# Patient Record
Sex: FEMALE | Race: WHITE | Employment: FULL TIME | URBAN - METROPOLITAN AREA
[De-identification: names, ages, dates, MRNs, and addresses within clinical notes are randomized per-mention and may not be internally consistent; named-entity substitution may affect disease eponyms.]

---

## 2017-04-21 ENCOUNTER — OFFICE VISIT (OUTPATIENT)
Dept: FAMILY MEDICINE CLINIC | Age: 57
End: 2017-04-21

## 2017-04-21 VITALS
HEIGHT: 60 IN | DIASTOLIC BLOOD PRESSURE: 74 MMHG | WEIGHT: 166 LBS | SYSTOLIC BLOOD PRESSURE: 121 MMHG | RESPIRATION RATE: 16 BRPM | HEART RATE: 69 BPM | TEMPERATURE: 98.7 F | BODY MASS INDEX: 32.59 KG/M2 | OXYGEN SATURATION: 96 %

## 2017-04-21 DIAGNOSIS — E55.9 VITAMIN D DEFICIENCY: ICD-10-CM

## 2017-04-21 DIAGNOSIS — E78.5 HYPERLIPIDEMIA, UNSPECIFIED HYPERLIPIDEMIA TYPE: ICD-10-CM

## 2017-04-21 DIAGNOSIS — F32.1 MODERATE SINGLE CURRENT EPISODE OF MAJOR DEPRESSIVE DISORDER (HCC): ICD-10-CM

## 2017-04-21 DIAGNOSIS — Z12.11 COLON CANCER SCREENING: ICD-10-CM

## 2017-04-21 DIAGNOSIS — Z00.00 GENERAL MEDICAL EXAM: Primary | ICD-10-CM

## 2017-04-21 DIAGNOSIS — N95.9 MENOPAUSAL DISORDER: ICD-10-CM

## 2017-04-21 DIAGNOSIS — E03.4 HYPOTHYROIDISM DUE TO ACQUIRED ATROPHY OF THYROID: ICD-10-CM

## 2017-04-21 DIAGNOSIS — F41.9 ANXIETY: ICD-10-CM

## 2017-04-21 RX ORDER — LEVOTHYROXINE AND LIOTHYRONINE 57; 13.5 UG/1; UG/1
90 TABLET ORAL DAILY
Qty: 90 TAB | Refills: 4 | Status: SHIPPED | OUTPATIENT
Start: 2017-04-21 | End: 2017-11-24 | Stop reason: SDUPTHER

## 2017-04-21 RX ORDER — VENLAFAXINE 37.5 MG/1
37.5 TABLET ORAL 3 TIMES DAILY
Qty: 90 TAB | Refills: 0 | Status: SHIPPED | OUTPATIENT
Start: 2017-04-21 | End: 2017-11-10 | Stop reason: ALTCHOICE

## 2017-04-21 RX ORDER — ALPRAZOLAM 0.5 MG/1
0.5 TABLET ORAL
Qty: 90 TAB | Refills: 2 | Status: SHIPPED | OUTPATIENT
Start: 2017-04-21 | End: 2017-11-10 | Stop reason: SDUPTHER

## 2017-04-21 NOTE — PATIENT INSTRUCTIONS
Recovering From Depression: Care Instructions  Your Care Instructions  Taking good care of yourself is important as you recover from depression. In time, your symptoms will fade as your treatment takes hold. Do not give up. Instead, focus your energy on getting better. Your mood will improve. It just takes some time. Focus on things that can help you feel better, such as being with friends and family, eating well, and getting enough rest. But take things slowly. Do not do too much too soon. You will begin to feel better gradually. Follow-up care is a key part of your treatment and safety. Be sure to make and go to all appointments, and call your doctor if you are having problems. It's also a good idea to know your test results and keep a list of the medicines you take. How can you care for yourself at home? Be realistic  · If you have a large task to do, break it up into smaller steps you can handle, and just do what you can. · You may want to put off important decisions until your depression has lifted. If you have plans that will have a major impact on your life, such as marriage, divorce, or a job change, try to wait a bit. Talk it over with friends and loved ones who can help you look at the overall picture first.  · Reaching out to people for help is important. Do not isolate yourself. Let your family and friends help you. Find someone you can trust and confide in, and talk to that person. · Be patient, and be kind to yourself. Remember that depression is not your fault and is not something you can overcome with willpower alone. Treatment is necessary for depression, just like for any other illness. Feeling better takes time, and your mood will improve little by little. Stay active  · Stay busy and get outside. Take a walk, or try some other light exercise. · Talk with your doctor about an exercise program. Exercise can help with mild depression. · Go to a movie or concert.  Take part in a Anab[employer] activity or other social gathering. Go to a [employer] game. · Ask a friend to have dinner with you. Take care of yourself  · Eat a balanced diet with plenty of fresh fruits and vegetables, whole grains, and lean protein. If you have lost your appetite, eat small snacks rather than large meals. · Avoid drinking alcohol or using illegal drugs. Do not take medicines that have not been prescribed for you. They may interfere with medicines you may be taking for depression, or they may make your depression worse. · Take your medicines exactly as they are prescribed. You may start to feel better within 1 to 3 weeks of taking antidepressant medicine. But it can take as many as 6 to 8 weeks to see more improvement. If you have questions or concerns about your medicines, or if you do not notice any improvement by 3 weeks, talk to your doctor. · If you have any side effects from your medicine, tell your doctor. Antidepressants can make you feel tired, dizzy, or nervous. Some people have dry mouth, constipation, headaches, sexual problems, or diarrhea. Many of these side effects are mild and will go away on their own after you have been taking the medicine for a few weeks. Some may last longer. Talk to your doctor if side effects are bothering you too much. You might be able to try a different medicine. · Get enough sleep. If you have problems sleeping:  ¨ Go to bed at the same time every night, and get up at the same time every morning. ¨ Keep your bedroom dark and quiet. ¨ Do not exercise after 5:00 p.m. ¨ Avoid drinks with caffeine after 5:00 p.m. · Avoid sleeping pills unless they are prescribed by the doctor treating your depression. Sleeping pills may make you groggy during the day, and they may interact with other medicine you are taking. · If you have any other illnesses, such as diabetes, heart disease, or high blood pressure, make sure to continue with your treatment.  Tell your doctor about all of the medicines you take, including those with or without a prescription. · Keep the numbers for these national suicide hotlines: 9-581-724-TALK (9-753.761.9762) and 3-200-GKJYXBU (4-670.823.1898). If you or someone you know talks about suicide or feeling hopeless, get help right away. When should you call for help? Call 911 anytime you think you may need emergency care. For example, call if:  · You feel like hurting yourself or someone else. · Someone you know has depression and is about to attempt or is attempting suicide. Call your doctor now or seek immediate medical care if:  · You hear voices. · Someone you know has depression and:  ¨ Starts to give away his or her possessions. ¨ Uses illegal drugs or drinks alcohol heavily. ¨ Talks or writes about death, including writing suicide notes or talking about guns, knives, or pills. ¨ Starts to spend a lot of time alone. ¨ Acts very aggressively or suddenly appears calm. Watch closely for changes in your health, and be sure to contact your doctor if:  · You do not get better as expected. Where can you learn more? Go to http://go-judy.info/. Enter L457 in the search box to learn more about \"Recovering From Depression: Care Instructions. \"  Current as of: July 26, 2016  Content Version: 11.2  © 5143-9466 "[employer] Incorporated. Care instructions adapted under license by [employer] (which disclaims liability or warranty for this information). If you have questions about a medical condition or this instruction, always ask your healthcare professional. [place] [address] any warranty or liability for your use of this information.

## 2017-04-21 NOTE — MR AVS SNAPSHOT
Visit Information Date & Time Provider Department Dept. Phone Encounter #  
 4/21/2017  8:00 AM Kaylyn Mueller MD [address] [facility] [phone] [id number] Upcoming Health Maintenance Date Due FOBT Q 1 YEAR AGE 50-75 11/7/2015 PAP AKA CERVICAL CYTOLOGY 5/30/2017 BREAST CANCER SCRN MAMMOGRAM 6/28/2018 DTaP/Tdap/Td series (2 - Td) 11/7/2020 Allergies as of 4/21/2017  Review Complete On: 4/21/2017 By: Kaylyn Mueller MD  
  
 Severity Noted Reaction Type Reactions Adhesive High 11/18/2010   Topical Other (comments) Eat skin off Codeine High 11/18/2010   Systemic Anaphylaxis Darvon [Propoxyphene] High 11/18/2010   Side Effect Itching Percocet [Oxycodone-acetaminophen] High 11/18/2010   Side Effect Itching Sulfa (Sulfonamide Antibiotics) High 11/18/2010   Systemic Hives Vicodin [Hydrocodone-acetaminophen] High 11/18/2010   Side Effect Itching Avocado  04/21/2017    Swelling Demerol [Meperidine]  11/22/2010   Side Effect Itching Dilaudid [Hydromorphone]  11/22/2010   Side Effect Itching Lexapro [Escitalopram]  04/21/2017    Other (comments)  
 shake Current Immunizations  Reviewed on 12/2/2015 Name Date Influenza Vaccine 10/21/2016, 10/18/2013 Influenza Vaccine Louellen Dexter) 11/7/2014 Influenza Vaccine (Quad) PF 12/2/2015 Influenza Vaccine Split 9/19/2012 Influenza Vaccine Whole 11/4/2010 Tdap 11/7/2010 Not reviewed this visit You Were Diagnosed With   
  
 Codes Comments General medical exam    -  Primary ICD-10-CM: Z00.00 ICD-9-CM: V70.9 Hypothyroidism due to acquired atrophy of thyroid     ICD-10-CM: E03.4 ICD-9-CM: 244.8, 246.8 Hyperlipidemia, unspecified hyperlipidemia type     ICD-10-CM: E78.5 ICD-9-CM: 272.4 Vitamin D deficiency     ICD-10-CM: E55.9 ICD-9-CM: 268.9 Colon cancer screening     ICD-10-CM: Z12.11 ICD-9-CM: V76.51 Anxiety     ICD-10-CM: F41.9 ICD-9-CM: 300.00 Menopausal disorder     ICD-10-CM: N95.9 ICD-9-CM: 627.9 Moderate single current episode of major depressive disorder (HCC)     ICD-10-CM: F32.1 ICD-9-CM: 296.22 Vitals BP Pulse Temp Resp Height(growth percentile) Weight(growth percentile) 121/74 (BP 1 Location: Left arm, BP Patient Position: Sitting) 69 98.7 °F (37.1 °C) 16 5' (1.524 m) 166 lb (75.3 kg) LMP SpO2 BMI OB Status Smoking Status 12/23/2011 96% 32.42 kg/m2 Postmenopausal Former Smoker BMI and BSA Data Body Mass Index Body Surface Area  
 32.42 kg/m 2 1.79 m 2 Preferred Pharmacy Pharmacy Name Phone [facility] [phone] Your Updated Medication List  
  
   
This list is accurate as of: 4/21/17  9:00 AM.  Always use your most recent med list.  
  
  
  
  
 ALPRAZolam 0.5 mg tablet Commonly known as:  Nicole Sham Take 1 Tab by mouth three (3) times daily as needed. Max Daily Amount: 1.5 mg.  
  
 COMBIPATCH 0.05-0.25 mg/24 hr  
Generic drug:  estradiol-norethindrone 1 Patch by TransDERmal route every Monday. thyroid (Pork) 90 mg tablet Commonly known as:  ARMOUR THYROID Take 1 Tab by mouth daily. venlafaxine 37.5 mg tablet Commonly known as:  [facility] Take 1 Tab by mouth three (3) times daily. Start once a day and slowly increase to three times a day Prescriptions Printed Refills ALPRAZolam (XANAX) 0.5 mg tablet 2 Sig: Take 1 Tab by mouth three (3) times daily as needed. Max Daily Amount: 1.5 mg.  
 Class: Print Route: Oral  
  
Prescriptions Sent to Pharmacy Refills  
 thyroid, Pork, (ARMOUR THYROID) 90 mg tablet 4 Sig: Take 1 Tab by mouth daily. Class: Normal  
 [facility]acy: [facility] [address]  #: [phone] Route: Oral  
 venlafaxine (EFFEXOR) 37.5 mg tablet 0 Sig: Take 1 Tab by mouth three (3) times daily.  Start once a day and slowly increase to three times a day Class: Normal  
 [facility]  #: [phone] Route: Oral  
  
We Performed the Following CBC WITH AUTOMATED DIFF [40243 CPT(R)] LIPID PANEL [54429 CPT(R)] METABOLIC PANEL, COMPREHENSIVE [81093 CPT(R)] OCCULT BLOOD, IMMUNOASSAY (FIT) [id number] CPT(R)] TSH 3RD GENERATION [11039 CPT(R)] VITAMIN D, 25 HYDROXY [id number] CPT(R)] Patient Instructions Recovering From Depression: Care Instructions Your Care Instructions Taking good care of yourself is important as you recover from depression. In time, your symptoms will fade as your treatment takes hold. Do not give up. Instead, focus your energy on getting better. Your mood will improve. It just takes some time. Focus on things that can help you feel better, such as being with friends and family, eating well, and getting enough rest. But take things slowly. Do not do too much too soon. You will begin to feel better gradually. Follow-up care is a key part of your treatment and safety. Be sure to make and go to all appointments, and call your doctor if you are having problems. It's also a good idea to know your test results and keep a list of the medicines you take. How can you care for yourself at home? Be realistic · If you have a large task to do, break it up into smaller steps you can handle, and just do what you can. · You may want to put off important decisions until your depression has lifted. If you have plans that will have a major impact on your life, such as marriage, divorce, or a job change, try to wait a bit. Talk it over with friends and loved ones who can help you look at the overall picture first. 
· Reaching out to people for help is important. Do not isolate yourself. Let your family and friends help you. Find someone you can trust and confide in, and talk to that person. · Be patient, and be kind to yourself. Remember that depression is not your fault and is not something you can overcome with willpower alone. Treatment is necessary for depression, just like for any other illness. Feeling better takes time, and your mood will improve little by little. Stay active · Stay busy and get outside. Take a walk, or try some other light exercise. · Talk with your doctor about an exercise program. Exercise can help with mild depression. · Go to a movie or concert. Take part in a [employer] activity or other social gathering. Go to a [employer] game. · Ask a friend to have dinner with you. Take care of yourself · Eat a balanced diet with plenty of fresh fruits and vegetables, whole grains, and lean protein. If you have lost your appetite, eat small snacks rather than large meals. · Avoid drinking alcohol or using illegal drugs. Do not take medicines that have not been prescribed for you. They may interfere with medicines you may be taking for depression, or they may make your depression worse. · Take your medicines exactly as they are prescribed. You may start to feel better within 1 to 3 weeks of taking antidepressant medicine. But it can take as many as 6 to 8 weeks to see more improvement. If you have questions or concerns about your medicines, or if you do not notice any improvement by 3 weeks, talk to your doctor. · If you have any side effects from your medicine, tell your doctor. Antidepressants can make you feel tired, dizzy, or nervous. Some people have dry mouth, constipation, headaches, sexual problems, or diarrhea. Many of these side effects are mild and will go away on their own after you have been taking the medicine for a few weeks. Some may last longer. Talk to your doctor if side effects are bothering you too much. You might be able to try a different medicine. · Get enough sleep. If you have problems sleeping: ¨ Go to bed at the same time every night, and get up at the same time every morning. ¨ Keep your bedroom dark and quiet. ¨ Do not exercise after 5:00 p.m. ¨ Avoid drinks with caffeine after 5:00 p.m. · Avoid sleeping pills unless they are prescribed by the doctor treating your depression. Sleeping pills may make you groggy during the day, and they may interact with other medicine you are taking. · If you have any other illnesses, such as diabetes, heart disease, or high blood pressure, make sure to continue with your treatment. Tell your doctor about all of the medicines you take, including those with or without a prescription. · Keep the numbers for these national suicide hotlines: 7-587-186-TALK (8-230.137.5784) and 3-395-VHKNRRI (8-968.443.9521). If you or someone you know talks about suicide or feeling hopeless, get help right away. When should you call for help? Call 911 anytime you think you may need emergency care. For example, call if: 
· You feel like hurting yourself or someone else. · Someone you know has depression and is about to attempt or is attempting suicide. Call your doctor now or seek immediate medical care if: 
· You hear voices. · Someone you know has depression and: 
¨ Starts to give away his or her possessions. ¨ Uses illegal drugs or drinks alcohol heavily. ¨ Talks or writes about death, including writing suicide notes or talking about guns, knives, or pills. ¨ Starts to spend a lot of time alone. ¨ Acts very aggressively or suddenly appears calm. Watch closely for changes in your health, and be sure to contact your doctor if: 
· You do not get better as expected. Where can you learn more? Go to http://go-judy.info/. Enter W842 in the search box to learn more about \"Recovering From Depression: Care Instructions. \" Current as of: July 26, 2016 Content Version: 11.2 © 3882-8090 No.1 [employer].  Care instructions adapted under license by [address] (which disclaims liability or warranty for this information). If you have questions about a medical condition or this instruction, always ask your healthcare professional. [address] any warranty or liability for your use of this information. Introducing [facility]! Dear Kerry Barboza: Thank you for requesting a [employer] account. Our records indicate that you already have an active [employer] account. You can access your account anytime at [email or web address] Did you know that you can access your hospital and ER discharge instructions at any time in [employer]? You can also review all of your test results from your hospital stay or ER visit. Additional Information If you have questions, please visit the Frequently Asked Questions section of the [employer] website at [email or web address] Remember, [employer] is NOT to be used for urgent needs. For medical emergencies, dial 911. Now available from your iPhone and Android! Please provide this summary of care documentation to your next provider. Your primary care clinician is listed as Beatriz Lopez. If you have any questions after today's visit, please call [phone].

## 2017-04-21 NOTE — PROGRESS NOTES
55 y.o. female presents for a physical.    Patient Active Problem List    Diagnosis    Hypothyroidism     S/p right hemithyroidectomy for nodules. Benign      Hyperlipidemia - Takes medication at night as directed, no muscle aches. Tries to watch diet.  Anxiety - taking xanax most evenings, but has been sometimes 4 days without taking it.  Menopausal disorder - complains of worsening anxiety, difficulty losing weight, memory problems, crying frequently. Past Medical History:   Diagnosis Date    Anxiety     Asthma 2008    allergy induced in [address] ([facility] [address]     basal cell 20 years ago upper abdomen    Foot fracture     Hyperlipidemia     Hypothyroidism     Interstitial cystitis     [facility] at [facility] Menopausal disorder     Other ill-defined conditions     kidney stones in [place]    PONV (postoperative nausea and vomiting)     severe nausea 1x in the past    Psychiatric disorder 11/10    anxiety attack    Thyroid disease        Past Surgical History:   Procedure Laterality Date    BREAST SURGERY PROCEDURE UNLISTED      left breast lumpectomy    HX ADENOIDECTOMY  1977    HX DILATION AND CURETTAGE      miscarriage x1    HX GI  2010    cholecystectomy    HX GYN      laparoscopy for fertility    HX OTHER SURGICAL  2006    hemithyroidectomy right, gall bladder removal    HX TONSILLECTOMY  1977       Family History   Problem Relation Age of Onset    Cancer Mother     Heart Disease Father     Heart Disease Brother        Social History   Substance Use Topics    Smoking status: Former Smoker     Packs/day: 1.00     Years: 12.00     Quit date: 2/12/1990    Smokeless tobacco: Never Used    Alcohol use No      Comment: rarely       Social History     Social History Narrative    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   Living with        Health Maintenance Due   Topic Date Due    FOBT Q 1 YEAR AGE 50-75  11/07/2015    INFLUENZA AGE 9 TO ADULT  08/01/2016    PAP AKA CERVICAL CYTOLOGY  05/30/2017       Outpatient Prescriptions Marked as Taking for the 4/21/17 encounter (Office Visit) with Gwen Kapadia MD   Medication Sig Dispense Refill    ALPRAZolam Euell Pau) 0.5 mg tablet Take 1 Tab by mouth as needed. 90 Tab 2    thyroid, Pork, (ARMOUR THYROID) 90 mg tablet Take 1 Tab by mouth daily. 90 Tab 4    estradiol-norethindrone (COMBIPATCH) 0.05-0.25 mg/24 hr 1 Patch by TransDERmal route every Monday.            Allergies   Allergen Reactions    Adhesive Other (comments)     Eat skin off    Codeine Anaphylaxis    Darvon [Propoxyphene] Itching    Percocet [Oxycodone-Acetaminophen] Itching    Sulfa (Sulfonamide Antibiotics) Hives    Vicodin [Hydrocodone-Acetaminophen] Itching    Avocado Swelling    Demerol [Meperidine] Itching    Dilaudid [Hydromorphone] Itching       Review of Systems:  Gen: no fatigue, fever, chills, weight loss or weight gain  Eyes: no excessive tearing, itching, or discharge  Nose: no rhinorrhea, no sinus pain  Mouth: no oral lesions, no sore throat  Resp: no shortness of breath, no wheezing, no cough  CV: no chest pain, no orthopnea, no paroxysmal nocturnal dyspnea, no lower extremity edema, no palpitations  Abd: no nausea, no heartburn, no diarrhea, no constipation, no abdominal pain  Neuro: no headaches, no syncope or presyncopal episodes  Endo: no polyuria, no polydipsia  Heme: no lymphadenopathy, no easy bruising or bleeding    Visit Vitals    /74 (BP 1 Location: Left arm, BP Patient Position: Sitting)    Pulse 69    Temp 98.7 °F (37.1 °C)    Resp 16    Ht 5' (1.524 m)    Wt 166 lb (75.3 kg)    LMP 12/23/2011    SpO2 96%    BMI 32.42 kg/m2     Gen: alert, oriented, no acute distress  Ears: external auditory canals clear, TMs without erythema or effusion  Eyes: pupils equal round reactive to light, sclera clear, conjunctiva clear  Oral: moist mucus membranes, no oral lesions, no pharyngeal inflammation or exudate  Neck: thyroid symmetric and not enlarged, no carotid bruits, no jugular vein distention  Resp: no increase work of breathing, lungs clear to ausculation bilaterally, no wheezing, rales or rhonchi  CV: S1, S2 normal. No murmurs, rubs, or gallops. Abd: soft, not tender, not distended. No hepatosplenomegaly. Normal bowel sounds. No hernias. Neuro: cranial nerves intact, normal strength and movement in all extremities, reflexes and sensation intact and symmetric. Psych: speaks with a normal rate and fluency, tearful, crying, anxious  Skin: no lesion or rash  Extremities: no cyanosis or edema    Assessment/Plan:    1. General medical exam  Age appropriate Health Maintenance activities reviewed with patient and updated. 2. Hypothyroidism due to acquired atrophy of thyroid  No changes in medications pending lab results. - CBC WITH AUTOMATED DIFF  - TSH 3RD GENERATION    3. Hyperlipidemia, unspecified hyperlipidemia type  No changes in medications pending lab results. - METABOLIC PANEL, COMPREHENSIVE  - LIPID PANEL    4. Vitamin D deficiency  No changes in medications pending lab results. - VITAMIN D, 25 HYDROXY    5. Colon cancer screening  - OCCULT BLOOD, IMMUNOASSAY (FIT)    6. Anxiety  Recently worsened and now depressed    7. Menopausal disorder  Mostly psychiatric effects    8. Moderate single current episode of major depressive disorder [facility])  Start effexor, tried to encourage to be compliant with medication and to call before deeming herself allergic if she has side effects. Follow up by phone in 2 weeks, in person in 4 weeks. Will very slowly uptitrate dose.       Health Maintenance reviewed - updated    Orders Placed This Encounter    METABOLIC PANEL, COMPREHENSIVE    CBC WITH AUTOMATED DIFF    LIPID PANEL    TSH 3RD GENERATION    VITAMIN D, 25 HYDROXY    OCCULT BLOOD, IMMUNOASSAY (FIT)       Medications Discontinued During This Encounter   Medication Reason    EPINEPHrine (EPIPEN) 0.3 mg/0.3 mL (1:1,000) injection Not A Current Medication       Current Outpatient Prescriptions   Medication Sig Dispense Refill    ALPRAZolam (XANAX) 0.5 mg tablet Take 1 Tab by mouth as needed. 90 Tab 2    thyroid, Pork, (ARMOUR THYROID) 90 mg tablet Take 1 Tab by mouth daily. 90 Tab 4    estradiol-norethindrone (COMBIPATCH) 0.05-0.25 mg/24 hr 1 Patch by TransDERmal route every Monday. Recommended healthy diet low in carbohydrates, fats, sodium and cholesterol. Recommended regular cardiovascular exercise 3-6 times per week for 30-60 minutes daily. Verbal and written instructions (see AVS) provided. Patient expresses understanding of diagnosis and treatment plan.

## 2017-04-22 LAB
25(OH)D3+25(OH)D2 SERPL-MCNC: 20.8 NG/ML (ref 30–100)
ALBUMIN SERPL-MCNC: 4.3 G/DL (ref 3.5–5.5)
ALBUMIN/GLOB SERPL: 1.8 {RATIO} (ref 1.2–2.2)
ALP SERPL-CCNC: 55 IU/L (ref 39–117)
ALT SERPL-CCNC: 16 IU/L (ref 0–32)
AST SERPL-CCNC: 12 IU/L (ref 0–40)
BASOPHILS # BLD AUTO: 0 X10E3/UL (ref 0–0.2)
BASOPHILS NFR BLD AUTO: 0 %
BILIRUB SERPL-MCNC: 0.4 MG/DL (ref 0–1.2)
BUN SERPL-MCNC: 13 MG/DL (ref 6–24)
BUN/CREAT SERPL: 19 (ref 9–23)
CALCIUM SERPL-MCNC: 9 MG/DL (ref 8.7–10.2)
CHLORIDE SERPL-SCNC: 104 MMOL/L (ref 96–106)
CHOLEST SERPL-MCNC: 191 MG/DL (ref 100–199)
CO2 SERPL-SCNC: 22 MMOL/L (ref 18–29)
CREAT SERPL-MCNC: 0.67 MG/DL (ref 0.57–1)
EOSINOPHIL # BLD AUTO: 0.1 X10E3/UL (ref 0–0.4)
EOSINOPHIL NFR BLD AUTO: 1 %
ERYTHROCYTE [DISTWIDTH] IN BLOOD BY AUTOMATED COUNT: 13.5 % (ref 12.3–15.4)
GLOBULIN SER CALC-MCNC: 2.4 G/DL (ref 1.5–4.5)
GLUCOSE SERPL-MCNC: 88 MG/DL (ref 65–99)
HCT VFR BLD AUTO: 42 % (ref 34–46.6)
HDLC SERPL-MCNC: 53 MG/DL
HGB BLD-MCNC: 14 G/DL (ref 11.1–15.9)
IMM GRANULOCYTES # BLD: 0 X10E3/UL (ref 0–0.1)
IMM GRANULOCYTES NFR BLD: 0 %
INTERPRETATION, 910389: NORMAL
LDLC SERPL CALC-MCNC: 124 MG/DL (ref 0–99)
LYMPHOCYTES # BLD AUTO: 1.8 X10E3/UL (ref 0.7–3.1)
LYMPHOCYTES NFR BLD AUTO: 22 %
MCH RBC QN AUTO: 29 PG (ref 26.6–33)
MCHC RBC AUTO-ENTMCNC: 33.3 G/DL (ref 31.5–35.7)
MCV RBC AUTO: 87 FL (ref 79–97)
MONOCYTES # BLD AUTO: 0.8 X10E3/UL (ref 0.1–0.9)
MONOCYTES NFR BLD AUTO: 10 %
NEUTROPHILS # BLD AUTO: 5.3 X10E3/UL (ref 1.4–7)
NEUTROPHILS NFR BLD AUTO: 67 %
PLATELET # BLD AUTO: 339 X10E3/UL (ref 150–379)
POTASSIUM SERPL-SCNC: 4.2 MMOL/L (ref 3.5–5.2)
PROT SERPL-MCNC: 6.7 G/DL (ref 6–8.5)
RBC # BLD AUTO: 4.82 X10E6/UL (ref 3.77–5.28)
SODIUM SERPL-SCNC: 141 MMOL/L (ref 134–144)
TRIGL SERPL-MCNC: 72 MG/DL (ref 0–149)
TSH SERPL DL<=0.005 MIU/L-ACNC: 2.2 UIU/ML (ref 0.45–4.5)
VLDLC SERPL CALC-MCNC: 14 MG/DL (ref 5–40)
WBC # BLD AUTO: 8 X10E3/UL (ref 3.4–10.8)

## 2017-11-10 ENCOUNTER — OFFICE VISIT (OUTPATIENT)
Dept: FAMILY MEDICINE CLINIC | Age: 57
End: 2017-11-10

## 2017-11-10 VITALS
HEIGHT: 60 IN | OXYGEN SATURATION: 98 % | RESPIRATION RATE: 17 BRPM | DIASTOLIC BLOOD PRESSURE: 84 MMHG | WEIGHT: 167 LBS | HEART RATE: 77 BPM | SYSTOLIC BLOOD PRESSURE: 130 MMHG | TEMPERATURE: 98.7 F | BODY MASS INDEX: 32.79 KG/M2

## 2017-11-10 DIAGNOSIS — E78.5 HYPERLIPIDEMIA, UNSPECIFIED HYPERLIPIDEMIA TYPE: ICD-10-CM

## 2017-11-10 DIAGNOSIS — E03.4 HYPOTHYROIDISM DUE TO ACQUIRED ATROPHY OF THYROID: Primary | ICD-10-CM

## 2017-11-10 DIAGNOSIS — F41.9 ANXIETY: ICD-10-CM

## 2017-11-10 DIAGNOSIS — Z23 ENCOUNTER FOR IMMUNIZATION: ICD-10-CM

## 2017-11-10 DIAGNOSIS — E55.9 VITAMIN D DEFICIENCY: ICD-10-CM

## 2017-11-10 DIAGNOSIS — L98.9 SKIN LESION OF FACE: ICD-10-CM

## 2017-11-10 RX ORDER — ALPRAZOLAM 0.5 MG/1
0.5 TABLET ORAL
Qty: 90 TAB | Refills: 0 | Status: SHIPPED | OUTPATIENT
Start: 2017-11-10 | End: 2018-03-02 | Stop reason: SDUPTHER

## 2017-11-10 NOTE — MR AVS SNAPSHOT
Visit Information Date & Time Provider Department Dept. Phone Encounter #  
 11/10/2017  2:00 PM Manasa South, [address] [address] [phone] [id number] Upcoming Health Maintenance Date Due FOBT Q 1 YEAR AGE 50-75 11/7/2015 PAP AKA CERVICAL CYTOLOGY 5/30/2017 Influenza Age 5 to Adult 8/1/2017 BREAST CANCER SCRN MAMMOGRAM 6/28/2018 DTaP/Tdap/Td series (2 - Td) 11/7/2020 Allergies as of 11/10/2017  Review Complete On: 11/10/2017 By: Manasa South MD  
  
 Severity Noted Reaction Type Reactions Adhesive High 11/18/2010   Topical Other (comments) Eat skin off Codeine High 11/18/2010   Systemic Anaphylaxis Darvon [Propoxyphene] High 11/18/2010   Side Effect Itching Percocet [Oxycodone-acetaminophen] High 11/18/2010   Side Effect Itching Sulfa (Sulfonamide Antibiotics) High 11/18/2010   Systemic Hives Vicodin [Hydrocodone-acetaminophen] High 11/18/2010   Side Effect Itching Avocado  04/21/2017    Swelling Demerol [Meperidine]  11/22/2010   Side Effect Itching Dilaudid [Hydromorphone]  11/22/2010   Side Effect Itching Lexapro [Escitalopram]  04/21/2017    Other (comments)  
 shake Current Immunizations  Reviewed on 12/2/2015 Name Date Influenza Vaccine 10/21/2016, 10/18/2013 Influenza Vaccine Donnita Saltness) 11/7/2014 Influenza Vaccine (Quad) PF  Incomplete, 12/2/2015 Influenza Vaccine Split 9/19/2012 Influenza Vaccine Whole 11/4/2010 Tdap 11/7/2010 Not reviewed this visit You Were Diagnosed With   
  
 Codes Comments Hypothyroidism due to acquired atrophy of thyroid    -  Primary ICD-10-CM: E03.4 ICD-9-CM: 244.8, 246.8 Hyperlipidemia, unspecified hyperlipidemia type     ICD-10-CM: E78.5 ICD-9-CM: 272.4 Vitamin D deficiency     ICD-10-CM: E55.9 ICD-9-CM: 268.9 Encounter for immunization     ICD-10-CM: D55 ICD-9-CM: V03.89 Anxiety     ICD-10-CM: F41.9 ICD-9-CM: 300.00 Skin lesion of face     ICD-10-CM: L98.9 ICD-9-CM: 709.9 Vitals BP Pulse Temp Resp Height(growth percentile) Weight(growth percentile) 130/84 (BP 1 Location: Left arm, BP Patient Position: Sitting) 77 98.7 °F (37.1 °C) (Oral) 17 5' (1.524 m) 167 lb (75.8 kg) LMP SpO2 BMI OB Status Smoking Status 12/23/2011 98% 32.61 kg/m2 Postmenopausal Former Smoker Vitals History BMI and BSA Data Body Mass Index Body Surface Area  
 32.61 kg/m 2 1.79 m 2 Preferred Pharmacy Pharmacy Name Phone Rylan Rosario, [facility] [phone] Your Updated Medication List  
  
   
This list is accurate as of: 11/10/17  2:48 PM.  Always use your most recent med list.  
  
  
  
  
 ALPRAZolam 0.5 mg tablet Commonly known as:  Jackman Leaks Take 1 Tab by mouth nightly as needed. Max Daily Amount: 0.5 mg.  
  
 COMBIPATCH 0.05-0.25 mg/24 hr  
Generic drug:  estradiol-norethindrone 1 Patch by TransDERmal route every Monday. thyroid (Pork) 90 mg tablet Commonly known as:  ARMOUR THYROID Take 1 Tab by mouth daily. Prescriptions Printed Refills ALPRAZolam (XANAX) 0.5 mg tablet 0 Sig: Take 1 Tab by mouth nightly as needed. Max Daily Amount: 0.5 mg.  
 Class: Print Route: Oral  
  
We Performed the Following CBC WITH AUTOMATED DIFF [92109 CPT(R)] INFLUENZA VIRUS VAC QUAD,SPLIT,PRESV FREE SYRINGE IM [id number] CPT(R)] LIPID PANEL [10167 CPT(R)] METABOLIC PANEL, COMPREHENSIVE [22252 CPT(R)] LA IMMUNIZ ADMIN,1 SINGLE/COMB VAC/TOXOID [id number] CPT(R)] REFERRAL TO PLASTIC SURGERY [REF89 Custom] TSH 3RD GENERATION [19289 CPT(R)] VITAMIN D, 25 HYDROXY [id number] CPT(R)] Referral Information Referral ID Referred By Referred To  
  
 [id number] TIM, 7062 Kai Mary MD, [address] [place], [address] Visits Status Start Date End Date 1 New Request 11/10/17 11/10/18 If your referral has a status of pending review or denied, additional information will be sent to support the outcome of this decision. Patient Instructions Vaccine Information Statement Influenza (Flu) Vaccine (Inactivated or Recombinant): What you need to know Many Vaccine Information Statements are available in [place] and other languages. See www.immunize.org/vis Hojas de Información Sobre Vacunas están disponibles en Español y en muchos otros idiomas. Visite www.immunize.org/vis 1. Why get vaccinated? Influenza (flu) is a contagious disease that spreads around the [place] every year, usually between October and May. Flu is caused by influenza viruses, and is spread mainly by coughing, sneezing, and close contact. Anyone can get flu. Flu strikes suddenly and can last several days. Symptoms vary by age, but can include: 
 fever/chills  sore throat  muscle aches  fatigue  cough  headache  runny or stuffy nose Flu can also lead to pneumonia and blood infections, and cause diarrhea and seizures in children. If you have a medical condition, such as heart or lung disease, flu can make it worse. Flu is more dangerous for some people. Infants and young children, people 72years of age and older, pregnant women, and people with certain health conditions or a weakened immune system are at greatest risk. Each year thousands of people in the [facility] die from flu, and many more are hospitalized. Flu vaccine can: 
 keep you from getting flu, 
 make flu less severe if you do get it, and 
 keep you from spreading flu to your family and other people. 2. Inactivated and recombinant flu vaccines A dose of flu vaccine is recommended every flu season. Children 6 months through 6years of age may need two doses during the same flu season. Everyone else needs only one dose each flu season. Some inactivated flu vaccines contain a very small amount of a mercury-based preservative called thimerosal. Studies have not shown thimerosal in vaccines to be harmful, but flu vaccines that do not contain thimerosal are available. There is no live flu virus in flu shots. They cannot cause the flu. There are many flu viruses, and they are always changing. Each year a new flu vaccine is made to protect against three or four viruses that are likely to cause disease in the upcoming flu season. But even when the vaccine doesnt exactly match these viruses, it may still provide some protection Flu vaccine cannot prevent: 
 flu that is caused by a virus not covered by the vaccine, or 
 illnesses that look like flu but are not. It takes about 2 weeks for protection to develop after vaccination, and protection lasts through the flu season. 3. Some people should not get this vaccine Tell the person who is giving you the vaccine:  If you have any severe, life-threatening allergies. If you ever had a life-threatening allergic reaction after a dose of flu vaccine, or have a severe allergy to any part of this vaccine, you may be advised not to get vaccinated. Most, but not all, types of flu vaccine contain a small amount of egg protein.  If you ever had Guillain-Barré Syndrome (also called GBS). Some people with a history of GBS should not get this vaccine. This should be discussed with your doctor.  If you are not feeling well. It is usually okay to get flu vaccine when you have a mild illness, but you might be asked to come back when you feel better. 4. Risks of a vaccine reaction With any medicine, including vaccines, there is a chance of reactions. These are usually mild and go away on their own, but serious reactions are also possible. Most people who get a flu shot do not have any problems with it. Minor problems following a flu shot include:  soreness, redness, or swelling where the shot was given  hoarseness  sore, red or itchy eyes  cough  fever  aches  headache  itching  fatigue If these problems occur, they usually begin soon after the shot and last 1 or 2 days. More serious problems following a flu shot can include the following:  There may be a small increased risk of Guillain-Barré Syndrome (GBS) after inactivated flu vaccine. This risk has been estimated at 1 or 2 additional cases per million people vaccinated. This is much lower than the risk of severe complications from flu, which can be prevented by flu vaccine.  Young children who get the flu shot along with pneumococcal vaccine (PCV13) and/or DTaP vaccine at the same time might be slightly more likely to have a seizure caused by fever. Ask your doctor for more information. Tell your doctor if a child who is getting flu vaccine has ever had a seizure. Problems that could happen after any injected vaccine:  People sometimes faint after a medical procedure, including vaccination. Sitting or lying down for about 15 minutes can help prevent fainting, and injuries caused by a fall. Tell your doctor if you feel dizzy, or have vision changes or ringing in the ears.  Some people get severe pain in the shoulder and have difficulty moving the arm where a shot was given. This happens very rarely.  Any medication can cause a severe allergic reaction. Such reactions from a vaccine are very rare, estimated at about 1 in a million doses, and would happen within a few minutes to a few hours after the vaccination. As with any medicine, there is a very remote chance of a vaccine causing a serious injury or death. The safety of vaccines is always being monitored. For more information, visit: www.cdc.gov/vaccinesafety/ 
 
 
The [facility] Vaccine Injury Compensation Program (VICP) is a federal program that was created to compensate people who may have been injured by certain vaccines. Persons who believe they may have been injured by a vaccine can learn about the program and about filing a claim by calling 1-943.568.7378 or visiting the [employer] website at www.[facility].gov/vaccinecompensation. There is a time limit to file a claim for compensation. 7. How can I learn more?  Ask your healthcare provider. He or she can give you the vaccine package insert or suggest other sources of information.  Call your local or state health department.  Contact the Centers for Disease Control and Prevention (CDC): 
- Call 3-341.414.4272 (1-800-CDC-INFO) or 
- Visit CDCs website at www.cdc.gov/flu Vaccine Information Statement Inactivated Influenza Vaccine 8/7/2015 
42 UHanna Ledbetter [id number] Department of Health and [employer] Centers for Disease Control and Prevention Office Use Only [facility]! Dear [facility]: Thank you for requesting a [employer] account. Our records indicate that you already have an active [employer] account. You can access your account anytime at [email or web address]". [employer]/"[employer]" Did you know that you can access your hospital and ER discharge instructions at any time in [employer]? You can also review all of your test results from your hospital stay or ER visit. Additional Information If you have questions, please visit the Frequently Asked Questions section of the [employer] website at [email or web address]". [employer]/"[employer]"/. Remember, [employer] is NOT to be used for urgent needs. For medical emergencies, dial 911. Now available from your iPhone and Android! Please provide this summary of care documentation to your next provider. Your primary care clinician is listed as Anthony Khan. If you have any questions after today's visit, please call [phone].

## 2017-11-10 NOTE — PROGRESS NOTES
HPI:  65 y.o.  presents for follow up appointment. No hospital, ER or specialist visits since last primary care visit except as noted below. Patient Active Problem List    Diagnosis    Hypothyroidism     S/p right hemithyroidectomy for nodules. Benign. No hair loss, no constipation, no dry skin or brittle nails, no palpitations. Taking medication each morning on an empty stomach.  Hyperlipidemia - not on medication. Tries to watch diet.  Anxiety - taking xanax only at night, sometimes goes 5 days without taking it.  Menopausal disorder         Past Medical History:   Diagnosis Date    Anxiety     Asthma 2008    allergy induced in [address] ([facility] [address]     basal cell 20 years ago upper abdomen    Foot fracture     Hyperlipidemia     Hypothyroidism     Interstitial cystitis     [facility] Menopausal disorder     Other ill-defined conditions(799.89)     kidney stones in pass    PONV (postoperative nausea and vomiting)     severe nausea 1x in the past    Psychiatric disorder 11/10    anxiety attack    Thyroid disease        Social History   Substance Use Topics    Smoking status: Former Smoker     Packs/day: 1.00     Years: 12.00     Quit date: 2/12/1990    Smokeless tobacco: Never Used    Alcohol use No      Comment: rarely       Outpatient Prescriptions Marked as Taking for the 11/10/17 encounter (Office Visit) with Karen Soto MD   Medication Sig Dispense Refill    ALPRAZolam Gerianne Drain) 0.5 mg tablet Take 1 Tab by mouth nightly as needed. Max Daily Amount: 0.5 mg. 90 Tab 0    thyroid, Pork, (ARMOUR THYROID) 90 mg tablet Take 1 Tab by mouth daily. 90 Tab 4    estradiol-norethindrone (COMBIPATCH) 0.05-0.25 mg/24 hr 1 Patch by TransDERmal route every Monday.            Allergies   Allergen Reactions    Adhesive Other (comments)     Eat skin off    Codeine Anaphylaxis    Darvon [Propoxyphene] Itching    Percocet [Oxycodone-Acetaminophen] Itching    Sulfa (Sulfonamide Antibiotics) Hives    Vicodin [Hydrocodone-Acetaminophen] Itching    Avocado Swelling    Demerol [Meperidine] Itching    Dilaudid [Hydromorphone] Itching    Lexapro [Escitalopram] Other (comments)     shake       ROS:  ROS negative except as per HPI. PE:  Visit Vitals    /84 (BP 1 Location: Left arm, BP Patient Position: Sitting)    Pulse 77    Temp 98.7 °F (37.1 °C) (Oral)    Resp 17    Ht 5' (1.524 m)    Wt 167 lb (75.8 kg)    LMP 12/23/2011    SpO2 98%    BMI 32.61 kg/m2     Gen: alert, oriented, no acute distress  Head: normocephalic, atraumatic  Ears: external auditory canals clear, TMs without erythema or effusion  Eyes: pupils equal round reactive to light, sclera clear, conjunctiva clear  Oral: moist mucus membranes, no oral lesions, no pharyngeal inflammation or exudate  Neck: symmetric normal sized thyroid, no carotid bruits, no jugular vein distention  Resp: no increase work of breathing, lungs clear to ausculation bilaterally, no wheezing, rales or rhonchi  CV: S1, S2 normal.  No murmurs, rubs, or gallops. Abd: soft, not tender, not distended. No hepatosplenomegaly. Normal bowel sounds. Neuro: cranial nerves intact, normal strength and movement in all extremities, reflexes and sensation intact and symmetric. Skin: raised irregular shaped lesion on left cheek  Extremities: no cyanosis or edema      Assessment/Plan:    1. Hypothyroidism due to acquired atrophy of thyroid  No changes in medications pending lab results. - CBC WITH AUTOMATED DIFF  - TSH 3RD GENERATION    2. Hyperlipidemia, unspecified hyperlipidemia type  Denies orthopnea, PND, light headedness, palpitations, or dyspnea on exertion. Weight has been stable.    - METABOLIC PANEL, COMPREHENSIVE  - LIPID PANEL    3. Vitamin D deficiency  Repeat today  - VITAMIN D, 25 HYDROXY    4. Encounter for immunization  - Influenza virus vaccine (QUADRIVALENT PRES FREE SYRINGE) IM (04748)  - RI IMMUNIZ ADMIN,1 SINGLE/COMB VAC/TOXOID    5. Anxiety  Continue qhs xanax prn    6. Skin lesion of face  - REFERRAL TO PLASTIC SURGERY      Health Maintenance reviewed - updated. Orders Placed This Encounter    HI IMMUNIZ ADMIN,1 SINGLE/COMB VAC/TOXOID    Influenza virus vaccine (QUADRIVALENT PRES FREE SYRINGE) IM ([id number])    CBC WITH AUTOMATED DIFF    METABOLIC PANEL, COMPREHENSIVE    LIPID PANEL    TSH 3RD GENERATION    VITAMIN D, 25 HYDROXY    ALPRAZolam (XANAX) 0.5 mg tablet     Sig: Take 1 Tab by mouth nightly as needed. Max Daily Amount: 0.5 mg.     Dispense:  90 Tab     Refill:  0       Medications Discontinued During This Encounter   Medication Reason    venlafaxine (EFFEXOR) 37.5 mg tablet Therapy Completed    ALPRAZolam (XANAX) 0.5 mg tablet Reorder       Current Outpatient Prescriptions   Medication Sig Dispense Refill    ALPRAZolam (XANAX) 0.5 mg tablet Take 1 Tab by mouth nightly as needed. Max Daily Amount: 0.5 mg. 90 Tab 0    thyroid, Pork, (ARMOUR THYROID) 90 mg tablet Take 1 Tab by mouth daily. 90 Tab 4    estradiol-norethindrone (COMBIPATCH) 0.05-0.25 mg/24 hr 1 Patch by TransDERmal route every Monday. Recommended healthy diet low in carbohydrates, fats, sodium and cholesterol. Recommended regular cardiovascular exercise 3-6 times per week for 30-60 minutes daily. Verbal and written instructions (see AVS) provided. Patient expresses understanding of diagnosis and treatment plan.

## 2017-11-10 NOTE — PROGRESS NOTES
Jose Mary is a 65 y.o. female who presents for routine immunizations. She denies any symptoms , reactions or allergies that would exclude them from being immunized today. Risks and adverse reactions were discussed and the VIS was given to them. All questions were addressed. She was observed for 15 min post injection. There were no reactions observed.     Katelyn Burgos LPN

## 2017-11-10 NOTE — PATIENT INSTRUCTIONS
Vaccine Information Statement    Influenza (Flu) Vaccine (Inactivated or Recombinant): What you need to know    Many Vaccine Information Statements are available in Nepali and other languages. See www.immunize.org/vis  Hojas de Información Sobre Vacunas están disponibles en Español y en muchos otros idiomas. Visite www.immunize.org/vis    1. Why get vaccinated? Influenza (flu) is a contagious disease that spreads around the [place] every year, usually between October and May. Flu is caused by influenza viruses, and is spread mainly by coughing, sneezing, and close contact. Anyone can get flu. Flu strikes suddenly and can last several days. Symptoms vary by age, but can include:   fever/chills   sore throat   muscle aches   fatigue   cough   headache    runny or stuffy nose    Flu can also lead to pneumonia and blood infections, and cause diarrhea and seizures in children. If you have a medical condition, such as heart or lung disease, flu can make it worse. Flu is more dangerous for some people. Infants and young children, people 72years of age and older, pregnant women, and people with certain health conditions or a weakened immune system are at greatest risk. Each year thousands of people in the [facility] die from flu, and many more are hospitalized. Flu vaccine can:   keep you from getting flu,   make flu less severe if you do get it, and   keep you from spreading flu to your family and other people. 2. Inactivated and recombinant flu vaccines    A dose of flu vaccine is recommended every flu season. Children 6 months through 6years of age may need two doses during the same flu season. Everyone else needs only one dose each flu season.        Some inactivated flu vaccines contain a very small amount of a mercury-based preservative called thimerosal. Studies have not shown thimerosal in vaccines to be harmful, but flu vaccines that do not contain thimerosal are available. There is no live flu virus in flu shots. They cannot cause the flu. There are many flu viruses, and they are always changing. Each year a new flu vaccine is made to protect against three or four viruses that are likely to cause disease in the upcoming flu season. But even when the vaccine doesnt exactly match these viruses, it may still provide some protection    Flu vaccine cannot prevent:   flu that is caused by a virus not covered by the vaccine, or   illnesses that look like flu but are not. It takes about 2 weeks for protection to develop after vaccination, and protection lasts through the flu season. 3. Some people should not get this vaccine    Tell the person who is giving you the vaccine:     If you have any severe, life-threatening allergies. If you ever had a life-threatening allergic reaction after a dose of flu vaccine, or have a severe allergy to any part of this vaccine, you may be advised not to get vaccinated. Most, but not all, types of flu vaccine contain a small amount of egg protein.  If you ever had Guillain-Barré Syndrome (also called GBS). Some people with a history of GBS should not get this vaccine. This should be discussed with your doctor.  If you are not feeling well. It is usually okay to get flu vaccine when you have a mild illness, but you might be asked to come back when you feel better. 4. Risks of a vaccine reaction    With any medicine, including vaccines, there is a chance of reactions. These are usually mild and go away on their own, but serious reactions are also possible. Most people who get a flu shot do not have any problems with it.      Minor problems following a flu shot include:    soreness, redness, or swelling where the shot was given     hoarseness   sore, red or itchy eyes   cough   fever   aches   headache   itching   fatigue  If these problems occur, they usually begin soon after the shot and last 1 or 2 days. More serious problems following a flu shot can include the following:     There may be a small increased risk of Guillain-Barré Syndrome (GBS) after inactivated flu vaccine. This risk has been estimated at 1 or 2 additional cases per million people vaccinated. This is much lower than the risk of severe complications from flu, which can be prevented by flu vaccine.  Young children who get the flu shot along with pneumococcal vaccine (PCV13) and/or DTaP vaccine at the same time might be slightly more likely to have a seizure caused by fever. Ask your doctor for more information. Tell your doctor if a child who is getting flu vaccine has ever had a seizure. Problems that could happen after any injected vaccine:      People sometimes faint after a medical procedure, including vaccination. Sitting or lying down for about 15 minutes can help prevent fainting, and injuries caused by a fall. Tell your doctor if you feel dizzy, or have vision changes or ringing in the ears.  Some people get severe pain in the shoulder and have difficulty moving the arm where a shot was given. This happens very rarely.  Any medication can cause a severe allergic reaction. Such reactions from a vaccine are very rare, estimated at about 1 in a million doses, and would happen within a few minutes to a few hours after the vaccination. As with any medicine, there is a very remote chance of a vaccine causing a serious injury or death. The safety of vaccines is always being monitored. For more information, visit: www.cdc.gov/vaccinesafety/    5. What if there is a serious reaction? What should I look for?  Look for anything that concerns you, such as signs of a severe allergic reaction, very high fever, or unusual behavior.     Signs of a severe allergic reaction can include hives, swelling of the face and throat, difficulty breathing, a fast heartbeat, dizziness, and weakness  usually within a few minutes to a few hours after the vaccination. What should I do?  If you think it is a severe allergic reaction or other emergency that cant wait, call 9-1-1 and get the person to the nearest hospital. Otherwise, call your doctor.  Reactions should be reported to the Vaccine Adverse Event Reporting System (VAERS). Your doctor should file this report, or you can do it yourself through  the VAERS web site at www.vaers. [facility].gov, or by calling 0-881.123.6743. VAERS does not give medical advice. 6. The National Vaccine Injury Compensation Program    The [facility] Vaccine Injury Compensation Program (VICP) is a federal program that was created to compensate people who may have been injured by certain vaccines. Persons who believe they may have been injured by a vaccine can learn about the program and about filing a claim by calling 0-657.487.5253 or visiting the [employer] website at www.[facility].gov/vaccinecompensation. There is a time limit to file a claim for compensation. 7. How can I learn more?  Ask your healthcare provider. He or she can give you the vaccine package insert or suggest other sources of information.  Call your local or state health department.  Contact the Centers for Disease Control and Prevention (CDC):  - Call 7-634.806.3274 (1-800-CDC-INFO) or  - Visit CDCs website at www.cdc.gov/flu    Vaccine Information Statement   Inactivated Influenza Vaccine   8/7/2015  42 AMBROSIO Elio Kira [id number]    Department of Health and Human Services  Centers for Disease Control and Prevention    Office Use Only

## 2017-11-11 LAB
25(OH)D3+25(OH)D2 SERPL-MCNC: 19.3 NG/ML (ref 30–100)
ALBUMIN SERPL-MCNC: 4 G/DL (ref 3.5–5.5)
ALBUMIN/GLOB SERPL: 1.9 {RATIO} (ref 1.2–2.2)
ALP SERPL-CCNC: 50 IU/L (ref 39–117)
ALT SERPL-CCNC: 14 IU/L (ref 0–32)
AST SERPL-CCNC: 12 IU/L (ref 0–40)
BASOPHILS # BLD AUTO: 0 X10E3/UL (ref 0–0.2)
BASOPHILS NFR BLD AUTO: 0 %
BILIRUB SERPL-MCNC: 0.2 MG/DL (ref 0–1.2)
BUN SERPL-MCNC: 15 MG/DL (ref 6–24)
BUN/CREAT SERPL: 25 (ref 9–23)
CALCIUM SERPL-MCNC: 8.9 MG/DL (ref 8.7–10.2)
CHLORIDE SERPL-SCNC: 104 MMOL/L (ref 96–106)
CHOLEST SERPL-MCNC: 176 MG/DL (ref 100–199)
CO2 SERPL-SCNC: 23 MMOL/L (ref 18–29)
CREAT SERPL-MCNC: 0.61 MG/DL (ref 0.57–1)
EOSINOPHIL # BLD AUTO: 0.1 X10E3/UL (ref 0–0.4)
EOSINOPHIL NFR BLD AUTO: 1 %
ERYTHROCYTE [DISTWIDTH] IN BLOOD BY AUTOMATED COUNT: 13.4 % (ref 12.3–15.4)
GFR SERPLBLD CREATININE-BSD FMLA CKD-EPI: 102 ML/MIN/1.73
GFR SERPLBLD CREATININE-BSD FMLA CKD-EPI: 117 ML/MIN/1.73
GLOBULIN SER CALC-MCNC: 2.1 G/DL (ref 1.5–4.5)
GLUCOSE SERPL-MCNC: 92 MG/DL (ref 65–99)
HCT VFR BLD AUTO: 37 % (ref 34–46.6)
HDLC SERPL-MCNC: 43 MG/DL
HGB BLD-MCNC: 12.5 G/DL (ref 11.1–15.9)
IMM GRANULOCYTES # BLD: 0 X10E3/UL (ref 0–0.1)
IMM GRANULOCYTES NFR BLD: 0 %
INTERPRETATION, 910389: NORMAL
LDLC SERPL CALC-MCNC: 98 MG/DL (ref 0–99)
LYMPHOCYTES # BLD AUTO: 1.8 X10E3/UL (ref 0.7–3.1)
LYMPHOCYTES NFR BLD AUTO: 19 %
MCH RBC QN AUTO: 29 PG (ref 26.6–33)
MCHC RBC AUTO-ENTMCNC: 33.8 G/DL (ref 31.5–35.7)
MCV RBC AUTO: 86 FL (ref 79–97)
MONOCYTES # BLD AUTO: 1.1 X10E3/UL (ref 0.1–0.9)
MONOCYTES NFR BLD AUTO: 12 %
NEUTROPHILS # BLD AUTO: 6.6 X10E3/UL (ref 1.4–7)
NEUTROPHILS NFR BLD AUTO: 68 %
PLATELET # BLD AUTO: 296 X10E3/UL (ref 150–379)
POTASSIUM SERPL-SCNC: 3.8 MMOL/L (ref 3.5–5.2)
PROT SERPL-MCNC: 6.1 G/DL (ref 6–8.5)
RBC # BLD AUTO: 4.31 X10E6/UL (ref 3.77–5.28)
SODIUM SERPL-SCNC: 142 MMOL/L (ref 134–144)
TRIGL SERPL-MCNC: 174 MG/DL (ref 0–149)
TSH SERPL DL<=0.005 MIU/L-ACNC: 1.84 UIU/ML (ref 0.45–4.5)
VLDLC SERPL CALC-MCNC: 35 MG/DL (ref 5–40)
WBC # BLD AUTO: 9.7 X10E3/UL (ref 3.4–10.8)

## 2017-11-13 RX ORDER — ERGOCALCIFEROL 1.25 MG/1
50000 CAPSULE ORAL
Qty: 12 CAP | Refills: 0 | Status: SHIPPED | OUTPATIENT
Start: 2017-11-13 | End: 2018-05-10 | Stop reason: ALTCHOICE

## 2017-11-14 NOTE — PROGRESS NOTES
Labs all look good except vitamin D level is low. Start vitamin D 37954D weekly for 12 weeks, then recheck. I sent the prescription to [facility].     Lenin Galicia MD

## 2017-11-26 RX ORDER — THYROID, PORCINE 90 MG/1
TABLET ORAL
Qty: 90 TAB | Refills: 3 | Status: SHIPPED | OUTPATIENT
Start: 2017-11-26 | End: 2018-03-06 | Stop reason: SDUPTHER

## 2017-11-29 ENCOUNTER — PATIENT MESSAGE (OUTPATIENT)
Dept: FAMILY MEDICINE CLINIC | Age: 57
End: 2017-11-29

## 2017-11-30 RX ORDER — AZITHROMYCIN 250 MG/1
TABLET, FILM COATED ORAL
Qty: 6 TAB | Refills: 0 | Status: SHIPPED | OUTPATIENT
Start: 2017-11-30 | End: 2017-12-05

## 2017-11-30 NOTE — TELEPHONE ENCOUNTER
From: Dilan Lebron  To: Konstantin Quan MD  Sent: 11/29/2017 10:30 PM EST  Subject: Prescription Question    Dr Angelo Andrew: I have an upper respiratory infection of some sort probably bronchitis. The last time I had this you gave me a z-pac and it worked well. Can you please call this in to the pharmacy. It's hard to get to [place] let alone get an appointment to see you. I've had this for going on 5 days now. I am getting green/brown out when I blow my nose and from coughing. The main issue is the cough won't stop mainly at night. I've been taking Musinex which seems to do okay during the day but the cough just will not stop at night. Thank You!   Randal Ruffin

## 2018-01-13 DIAGNOSIS — R68.89 FLU-LIKE SYMPTOMS: Primary | ICD-10-CM

## 2018-01-13 RX ORDER — OSELTAMIVIR PHOSPHATE 75 MG/1
75 CAPSULE ORAL 2 TIMES DAILY
Qty: 10 CAP | Refills: 0 | Status: SHIPPED | OUTPATIENT
Start: 2018-01-13 | End: 2018-01-18

## 2018-01-31 LAB — HEMOCCULT STL QL IA: NEGATIVE

## 2018-03-02 ENCOUNTER — TELEPHONE (OUTPATIENT)
Dept: FAMILY MEDICINE CLINIC | Age: 58
End: 2018-03-02

## 2018-03-02 DIAGNOSIS — F41.9 ANXIETY: Primary | ICD-10-CM

## 2018-03-02 RX ORDER — ALPRAZOLAM 0.5 MG/1
0.5 TABLET ORAL
Qty: 90 TAB | Refills: 0 | Status: SHIPPED | OUTPATIENT
Start: 2018-03-02 | End: 2018-05-10 | Stop reason: SDUPTHER

## 2018-03-07 RX ORDER — LEVOTHYROXINE AND LIOTHYRONINE 57; 13.5 UG/1; UG/1
90 TABLET ORAL DAILY
Qty: 90 TAB | Refills: 3 | Status: SHIPPED | OUTPATIENT
Start: 2018-03-07 | End: 2019-01-02 | Stop reason: SDUPTHER

## 2018-03-07 NOTE — TELEPHONE ENCOUNTER
From: Doe Malhotra  To: Marsha Christopher MD  Sent: 3/6/2018 8:14 PM EST  Subject: Medication Renewal Request    Original authorizing provider: MD Grady Doss Jann would like a refill of the following medications:  ARMOUR THYROID 90 mg tablet Marsha Christopher MD]    Preferred pharmacy: Other - [facility] (mail order) they will contact you    Comment:  Emeterio Felix: I have recently changed insurance plans and they are now requiring me to do mail order on all \"maintenance\" medication. I have signed up for mail order with [facility] so they should be requesting a new prescription for my [place] Thyroid 90 mg because they say they cannot use what is currently left at my pharmacy. Just wanted to give you a heads up. Thank you!  Eyad Mazariegos

## 2018-05-10 ENCOUNTER — OFFICE VISIT (OUTPATIENT)
Dept: FAMILY MEDICINE CLINIC | Age: 58
End: 2018-05-10

## 2018-05-10 VITALS
RESPIRATION RATE: 17 BRPM | DIASTOLIC BLOOD PRESSURE: 78 MMHG | OXYGEN SATURATION: 97 % | WEIGHT: 166 LBS | HEART RATE: 70 BPM | BODY MASS INDEX: 32.59 KG/M2 | SYSTOLIC BLOOD PRESSURE: 124 MMHG | HEIGHT: 60 IN | TEMPERATURE: 97.9 F

## 2018-05-10 DIAGNOSIS — R53.83 FATIGUE, UNSPECIFIED TYPE: ICD-10-CM

## 2018-05-10 DIAGNOSIS — F41.9 ANXIETY: ICD-10-CM

## 2018-05-10 DIAGNOSIS — R21 RASH: ICD-10-CM

## 2018-05-10 DIAGNOSIS — E03.4 HYPOTHYROIDISM DUE TO ACQUIRED ATROPHY OF THYROID: Primary | ICD-10-CM

## 2018-05-10 RX ORDER — TRIAMCINOLONE ACETONIDE 1 MG/G
OINTMENT TOPICAL 2 TIMES DAILY
Qty: 30 G | Refills: 0 | Status: SHIPPED | OUTPATIENT
Start: 2018-05-10 | End: 2018-05-10 | Stop reason: SDUPTHER

## 2018-05-10 RX ORDER — CHOLECALCIFEROL TAB 125 MCG (5000 UNIT) 125 MCG
TAB ORAL DAILY
COMMUNITY
End: 2021-01-12 | Stop reason: ALTCHOICE

## 2018-05-10 RX ORDER — ALPRAZOLAM 0.5 MG/1
0.5 TABLET ORAL
Qty: 90 TAB | Refills: 0 | Status: SHIPPED | OUTPATIENT
Start: 2018-05-10 | End: 2018-10-13 | Stop reason: SDUPTHER

## 2018-05-10 RX ORDER — TRIAMCINOLONE ACETONIDE 1 MG/G
OINTMENT TOPICAL 2 TIMES DAILY
Qty: 80 G | Refills: 0 | Status: SHIPPED | OUTPATIENT
Start: 2018-05-10 | End: 2019-12-31 | Stop reason: ALTCHOICE

## 2018-05-10 NOTE — PROGRESS NOTES
HPI:  65 y.o.  presents for follow up appointment. No hospital, ER or specialist visits since last primary care visit except as noted below. Patient Active Problem List    Diagnosis    Skin lesion of face    Hypothyroidism     S/p right hemithyroidectomy for nodules. Benign. Takes thyroid medication and waits 90 minutes then has almond milk - now she's worried that she is blocking absorption of medication. STaying in bed all weekend, gets 8 hours of sleep weeknights.  Hyperlipidemia     Anxiety - some anxiety requiring xanax. Gets nervous about storms etc.     Menopausal disorder - no more hot flashes. Past Medical History:   Diagnosis Date    Anxiety     Asthma 2008    [place]ergy [place] [address] ([address]     basal cell 20 years ago upper abdomen    Foot fracture     Hyperlipidemia     Hypothyroidism     Interstitial cystitis     Dr. Brandon Coronado at [facility] Menopausal disorder     Other ill-defined conditions(119.04)     kidney stones in [place]    PONV (postoperative nausea and vomiting)     severe nausea 1x in the past    Psychiatric disorder 11/10    anxiety attack    Thyroid disease        Social History   Substance Use Topics    Smoking status: Former Smoker     Packs/day: 1.00     Years: 12.00     Quit date: 2/12/1990    Smokeless tobacco: Never Used    Alcohol use No      Comment: rarely       Outpatient Prescriptions Marked as Taking for the 5/10/18 encounter (Office Visit) with Susan Lawrence MD   Medication Sig Dispense Refill    cholecalciferol, VITAMIN D3, (VITAMIN D3) 5,000 unit tab tablet Take  by mouth daily.  triamcinolone acetonide (KENALOG) 0.1 % ointment Apply  to affected area two (2) times a day. use thin layer 80 g 0    ALPRAZolam (XANAX) 0.5 mg tablet Take 1 Tab by mouth nightly as needed. Max Daily Amount: 0.5 mg. 90 Tab 0    thyroid, Pork, (ARMOUR THYROID) 90 mg tablet Take 1 Tab by mouth daily.  90 Tab 3    estradiol-norethindrone [facility]) 0.05-0.25 mg/24 hr 1 Patch by TransDERmal route every Monday. Allergies   Allergen Reactions    Adhesive Other (comments)     Eat skin off    Codeine Anaphylaxis    Darvon [Propoxyphene] Itching    Percocet [Oxycodone-Acetaminophen] Itching    Sulfa (Sulfonamide Antibiotics) Hives    Vicodin [Hydrocodone-Acetaminophen] Itching    Avocado Swelling    Demerol [Meperidine] Itching    Dilaudid [Hydromorphone] Itching    Lexapro [Escitalopram] Other (comments)     shake       ROS:  ROS negative except as per HPI. PE:  Visit Vitals    /78 (BP 1 Location: Left arm, BP Patient Position: Sitting)    Pulse 70    Temp 97.9 °F (36.6 °C) (Oral)    Resp 17    Ht 5' (1.524 m)    Wt 166 lb (75.3 kg)    LMP 12/23/2011    SpO2 97%    BMI 32.42 kg/m2     Gen: alert, oriented, no acute distress  Eyes: pupils equal round reactive to light, sclera clear, conjunctiva clear  Oral: moist mucus membranes, no oral lesions, no pharyngeal inflammation or exudate  Resp: no increase work of breathing, lungs clear to ausculation bilaterally, no wheezing, rales or rhonchi  CV: S1, S2 normal.  No murmurs, rubs, or gallops. Abd: soft, not tender, not distended. No hepatosplenomegaly. Normal bowel sounds. Neuro: cranial nerves intact, normal strength and movement in all extremities, reflexes and sensation intact and symmetric. Skin: irregular patch of hyperpigmented dry skin in mid back 1.5in x 4 inches  Extremities: no cyanosis or edema    Assessment/Plan:    1. Hypothyroidism due to acquired atrophy of thyroid  No changes in medications pending lab results. 2. Fatigue, unspecified type  Discussed importance of regular exercise  - CBC WITH AUTOMATED DIFF  - METABOLIC PANEL, COMPREHENSIVE  - TSH 3RD GENERATION  - T4, FREE    3. Rash  Hyperpigmented patch on back appears to be related to eczema. Try steroid ointment, has appt with dermatology pending.     4. Anxiety  Refilled medication  - ALPRAZolam (XANAX) 0.5 mg tablet; Take 1 Tab by mouth nightly as needed. Max Daily Amount: 0.5 mg.  Dispense: 90 Tab; Refill: 0      Health Maintenance reviewed - updated. Orders Placed This Encounter    CBC WITH AUTOMATED DIFF    METABOLIC PANEL, COMPREHENSIVE    TSH 3RD GENERATION    T4, FREE    DISCONTD: triamcinolone acetonide (KENALOG) 0.1 % ointment     Sig: Apply  to affected area two (2) times a day. use thin layer     Dispense:  30 g     Refill:  0    triamcinolone acetonide (KENALOG) 0.1 % ointment     Sig: Apply  to affected area two (2) times a day. use thin layer     Dispense:  80 g     Refill:  0    ALPRAZolam (XANAX) 0.5 mg tablet     Sig: Take 1 Tab by mouth nightly as needed. Max Daily Amount: 0.5 mg.     Dispense:  90 Tab     Refill:  0       Medications Discontinued During This Encounter   Medication Reason    ergocalciferol (ERGOCALCIFEROL) 50,000 unit capsule Therapy Completed    triamcinolone acetonide (KENALOG) 0.1 % ointment Reorder    ALPRAZolam (XANAX) 0.5 mg tablet Reorder       Current Outpatient Prescriptions   Medication Sig Dispense Refill    cholecalciferol, VITAMIN D3, (VITAMIN D3) 5,000 unit tab tablet Take  by mouth daily.  triamcinolone acetonide (KENALOG) 0.1 % ointment Apply  to affected area two (2) times a day. use thin layer 80 g 0    ALPRAZolam (XANAX) 0.5 mg tablet Take 1 Tab by mouth nightly as needed. Max Daily Amount: 0.5 mg. 90 Tab 0    thyroid, Pork, (ARMOUR THYROID) 90 mg tablet Take 1 Tab by mouth daily. 90 Tab 3    estradiol-norethindrone (COMBIPATCH) 0.05-0.25 mg/24 hr 1 Patch by TransDERmal route every Monday. Verbal and written instructions (see AVS) provided. Patient expresses understanding of diagnosis and treatment plan.

## 2018-05-10 NOTE — MR AVS SNAPSHOT
[facility] 
 
 
 [address] 
[phone] Patient: Urszula Bledsoe MRN: [id number] LLC:[birth year] Visit Information Date & Time Provider Department Dept. Phone Encounter #  
 5/10/2018  4:00 PM Lobo Chua, [address] [address] [phone] [id number] Follow-up Instructions Routing History Upcoming Health Maintenance Date Due  
 PAP AKA CERVICAL CYTOLOGY 5/30/2017 BREAST CANCER SCRN MAMMOGRAM 6/28/2018 Influenza Age 5 to Adult 8/1/2018 FOBT Q 1 YEAR AGE 50-75 1/24/2019 DTaP/Tdap/Td series (2 - Td) 11/7/2020 Allergies as of 5/10/2018  Review Complete On: 5/10/2018 By: Lobo Chua MD  
  
 Severity Noted Reaction Type Reactions Adhesive High 11/18/2010   Topical Other (comments) Eat skin off Codeine High 11/18/2010   Systemic Anaphylaxis Darvon [Propoxyphene] High 11/18/2010   Side Effect Itching Percocet [Oxycodone-acetaminophen] High 11/18/2010   Side Effect Itching Sulfa (Sulfonamide Antibiotics) High 11/18/2010   Systemic Hives Vicodin [Hydrocodone-acetaminophen] High 11/18/2010   Side Effect Itching Avocado  04/21/2017    Swelling Demerol [Meperidine]  11/22/2010   Side Effect Itching Dilaudid [Hydromorphone]  11/22/2010   Side Effect Itching Lexapro [Escitalopram]  04/21/2017    Other (comments)  
 shake Current Immunizations  Reviewed on 11/10/2017 Name Date Influenza Vaccine 10/21/2016, 10/18/2013 Influenza Vaccine Geoffry Gourd) 11/7/2014 Influenza Vaccine (Quad) PF 11/10/2017, 12/2/2015 Influenza Vaccine Split 9/19/2012 Influenza Vaccine Whole 11/4/2010 Tdap 11/7/2010 Not reviewed this visit You Were Diagnosed With   
  
 Codes Comments Hypothyroidism due to acquired atrophy of thyroid    -  Primary ICD-10-CM: E03.4 ICD-9-CM: 244.8, 246.8 Fatigue, unspecified type     ICD-10-CM: R53.83 ICD-9-CM: 780.79   
 Rash     ICD-10-CM: R21 
ICD-9-CM: 782.1 Anxiety     ICD-10-CM: F41.9 ICD-9-CM: 300.00 Vitals BP Pulse Temp Resp Height(growth percentile) Weight(growth percentile) 124/78 (BP 1 Location: Left arm, BP Patient Position: Sitting) 70 97.9 °F (36.6 °C) (Oral) 17 5' (1.524 m) 166 lb (75.3 kg) LMP SpO2 BMI OB Status Smoking Status 12/23/2011 97% 32.42 kg/m2 Postmenopausal Former Smoker Vitals History BMI and BSA Data Body Mass Index Body Surface Area  
 32.42 kg/m 2 1.79 m 2 Preferred Pharmacy Pharmacy Name Phone [facility] [phone] Your Updated Medication List  
  
   
This list is accurate as of 5/10/18  5:04 PM.  Always use your most recent med list.  
  
  
  
  
 ALPRAZolam 0.5 mg tablet Commonly known as:  Mike Hurtado Take 1 Tab by mouth nightly as needed. Max Daily Amount: 0.5 mg.  
  
 cholecalciferol (VITAMIN D3) 5,000 unit Tab tablet Commonly known as:  VITAMIN D3 Take  by mouth daily. COMBIPATCH 0.05-0.25 mg/24 hr  
Generic drug:  estradiol-norethindrone 1 Patch by TransDERmal route every Monday. thyroid (Pork) 90 mg tablet Commonly known as:  ARMOUR THYROID Take 1 Tab by mouth daily. triamcinolone acetonide 0.1 % ointment Commonly known as:  KENALOG Apply  to affected area two (2) times a day. use thin layer Prescriptions Sent to Pharmacy Refills  
 triamcinolone acetonide (KENALOG) 0.1 % ointment 0 Sig: Apply  to affected area two (2) times a day. use thin layer Class: Normal  
 [facility] Ph #: [phone] Route: Topical  
  
We Performed the Following CBC WITH AUTOMATED DIFF [03259 CPT(R)] METABOLIC PANEL, COMPREHENSIVE [92924 CPT(R)] T4, FREE [id number] CPT(R)] TSH 3RD GENERATION [93340 CPT(R)] Patient Instructions Hyperpigmentation over dry skin. Use steroid and see if it resolves. Hypothyroidism: Care Instructions Your Care Instructions You have hypothyroidism, which means that your body is not making enough thyroid hormone. This hormone helps your body use energy. If your thyroid level is low, you may feel tired, be constipated, have an increase in your blood pressure, or have dry skin or memory problems. You may also get cold easily, even when it is warm. Women with low thyroid levels may have heavy menstrual periods. A blood test to find your thyroid-stimulating hormone (TSH) level is used to check for hypothyroidism. A high TSH level may mean that you have low thyroid. When your body is not making enough thyroid hormone, TSH levels rise in an effort to make the body produce more. The treatment for hypothyroidism is to take thyroid hormone pills. You should start to feel better in 1 to 2 weeks. But it can take several months to see changes in the TSH level. You will need regular visits with your doctor to make sure you have the right dose of medicine. Most people need treatment for the rest of their lives. You will need to see your doctor regularly to have blood tests and to make sure you are doing well. Follow-up care is a key part of your treatment and safety. Be sure to make and go to all appointments, and call your doctor if you are having problems. It's also a good idea to know your test results and keep a list of the medicines you take. How can you care for yourself at home? · Take your thyroid hormone medicine exactly as prescribed. Call your doctor if you think you are having a problem with your medicine. Most people do not have side effects if they take the right amount of medicine regularly. ¨ Take the medicine 30 minutes before breakfast, and do not take it with calcium, vitamins, or iron. ¨ Do not take extra doses of your thyroid medicine.  It will not help you get better any faster, and it may cause side effects. ¨ If you forget to take a dose, do NOT take a double dose of medicine. Take your usual dose the next day. · Tell your doctor about all prescription, herbal, or over-the-counter products you take. · Take care of yourself. Eat a healthy diet, get enough sleep, and get regular exercise. When should you call for help? Call 911 anytime you think you may need emergency care. For example, call if: 
? · You passed out (lost consciousness). ? · You have severe trouble breathing. ? · You have a very slow heartbeat (less than 60 beats a minute). ? · You have a low body temperature (95°F or below). ?Call your doctor now or seek immediate medical care if: 
? · You feel tired, sluggish, or weak. ? · You have trouble remembering things or concentrating. ? · You do not begin to feel better 2 weeks after starting your medicine. ? Watch closely for changes in your health, and be sure to contact your doctor if you have any problems. Where can you learn more? Go to http://goMarketBridgejudy.info/. Enter T254 in the search box to learn more about \"Hypothyroidism: Care Instructions. \" Current as of: May 12, 2017 Content Version: 11.4 © 9155-8102 [employer]. Care instructions adapted under license by [employer] (which disclaims liability or warranty for this information). If you have questions about a medical condition or this instruction, always ask your healthcare professional. [place] [address] any warranty or liability for your use of this information. Introducing \[facility] [place] [facility]\"" & HEALTH SERVICES! Dear Danielle Curtis: Thank you for requesting a [employer] account. Our records indicate that you already have an active [employer] account. You can access your account anytime at [email or web address] Did you know that you can access your hospital and ER discharge instructions at any time in [employer]? You can also review all of your test results from your hospital stay or ER visit. Additional Information If you have questions, please visit the Frequently Asked Questions section of the [employer] website at [email or web address] Remember, [employer] is NOT to be used for urgent needs. For medical emergencies, dial 911. Now available from your iPhone and Android! Please provide this summary of care documentation to your next provider. Your primary care clinician is listed as Karen Wood. If you have any questions after today's visit, please call [phone].

## 2018-05-10 NOTE — PATIENT INSTRUCTIONS
Hyperpigmentation over dry skin. Use steroid and see if it resolves. Hypothyroidism: Care Instructions  Your Care Instructions    You have hypothyroidism, which means that your body is not making enough thyroid hormone. This hormone helps your body use energy. If your thyroid level is low, you may feel tired, be constipated, have an increase in your blood pressure, or have dry skin or memory problems. You may also get cold easily, even when it is warm. Women with low thyroid levels may have heavy menstrual periods. A blood test to find your thyroid-stimulating hormone (TSH) level is used to check for hypothyroidism. A high TSH level may mean that you have low thyroid. When your body is not making enough thyroid hormone, TSH levels rise in an effort to make the body produce more. The treatment for hypothyroidism is to take thyroid hormone pills. You should start to feel better in 1 to 2 weeks. But it can take several months to see changes in the TSH level. You will need regular visits with your doctor to make sure you have the right dose of medicine. Most people need treatment for the rest of their lives. You will need to see your doctor regularly to have blood tests and to make sure you are doing well. Follow-up care is a key part of your treatment and safety. Be sure to make and go to all appointments, and call your doctor if you are having problems. It's also a good idea to know your test results and keep a list of the medicines you take. How can you care for yourself at home? · Take your thyroid hormone medicine exactly as prescribed. Call your doctor if you think you are having a problem with your medicine. Most people do not have side effects if they take the right amount of medicine regularly. ¨ Take the medicine 30 minutes before breakfast, and do not take it with calcium, vitamins, or iron. ¨ Do not take extra doses of your thyroid medicine.  It will not help you get better any faster, and it may cause side effects. ¨ If you forget to take a dose, do NOT take a double dose of medicine. Take your usual dose the next day. · Tell your doctor about all prescription, herbal, or over-the-counter products you take. · Take care of yourself. Eat a healthy diet, get enough sleep, and get regular exercise. When should you call for help? Call 911 anytime you think you may need emergency care. For example, call if:  ? · You passed out (lost consciousness). ? · You have severe trouble breathing. ? · You have a very slow heartbeat (less than 60 beats a minute). ? · You have a low body temperature (95°F or below). ?Call your doctor now or seek immediate medical care if:  ? · You feel tired, sluggish, or weak. ? · You have trouble remembering things or concentrating. ? · You do not begin to feel better 2 weeks after starting your medicine. ? Watch closely for changes in your health, and be sure to contact your doctor if you have any problems. Where can you learn more? Go to http://go-judy.info/. Enter W490 in the search box to learn more about \"Hypothyroidism: Care Instructions. \"  Current as of: May 12, 2017  Content Version: 11.4  © 2453-2611 [employer]. Care instructions adapted under license by [employer] (which disclaims liability or warranty for this information). If you have questions about a medical condition or this instruction, always ask your healthcare professional. [place] [address] any warranty or liability for your use of this information.

## 2018-05-10 NOTE — PROGRESS NOTES
Chief Complaint   Patient presents with    Mass     on back    Fatigue    Shortness of Breath     Health Maintenance reviewed

## 2018-05-11 LAB
ALBUMIN SERPL-MCNC: 4.3 G/DL (ref 3.5–5.5)
ALBUMIN/GLOB SERPL: 1.8 {RATIO} (ref 1.2–2.2)
ALP SERPL-CCNC: 48 IU/L (ref 39–117)
ALT SERPL-CCNC: 12 IU/L (ref 0–32)
AST SERPL-CCNC: 15 IU/L (ref 0–40)
BASOPHILS # BLD AUTO: 0 X10E3/UL (ref 0–0.2)
BASOPHILS NFR BLD AUTO: 0 %
BILIRUB SERPL-MCNC: 0.3 MG/DL (ref 0–1.2)
BUN SERPL-MCNC: 16 MG/DL (ref 6–24)
BUN/CREAT SERPL: 26 (ref 9–23)
CALCIUM SERPL-MCNC: 8.9 MG/DL (ref 8.7–10.2)
CHLORIDE SERPL-SCNC: 99 MMOL/L (ref 96–106)
CO2 SERPL-SCNC: 25 MMOL/L (ref 18–29)
CREAT SERPL-MCNC: 0.61 MG/DL (ref 0.57–1)
EOSINOPHIL # BLD AUTO: 0.2 X10E3/UL (ref 0–0.4)
EOSINOPHIL NFR BLD AUTO: 2 %
ERYTHROCYTE [DISTWIDTH] IN BLOOD BY AUTOMATED COUNT: 13.4 % (ref 12.3–15.4)
GFR SERPLBLD CREATININE-BSD FMLA CKD-EPI: 102 ML/MIN/1.73
GFR SERPLBLD CREATININE-BSD FMLA CKD-EPI: 117 ML/MIN/1.73
GLOBULIN SER CALC-MCNC: 2.4 G/DL (ref 1.5–4.5)
GLUCOSE SERPL-MCNC: 70 MG/DL (ref 65–99)
HCT VFR BLD AUTO: 41.2 % (ref 34–46.6)
HGB BLD-MCNC: 13.5 G/DL (ref 11.1–15.9)
IMM GRANULOCYTES # BLD: 0 X10E3/UL (ref 0–0.1)
IMM GRANULOCYTES NFR BLD: 0 %
LYMPHOCYTES # BLD AUTO: 2.4 X10E3/UL (ref 0.7–3.1)
LYMPHOCYTES NFR BLD AUTO: 23 %
MCH RBC QN AUTO: 29.1 PG (ref 26.6–33)
MCHC RBC AUTO-ENTMCNC: 32.8 G/DL (ref 31.5–35.7)
MCV RBC AUTO: 89 FL (ref 79–97)
MONOCYTES # BLD AUTO: 1.2 X10E3/UL (ref 0.1–0.9)
MONOCYTES NFR BLD AUTO: 11 %
NEUTROPHILS # BLD AUTO: 6.6 X10E3/UL (ref 1.4–7)
NEUTROPHILS NFR BLD AUTO: 64 %
PLATELET # BLD AUTO: 289 X10E3/UL (ref 150–379)
POTASSIUM SERPL-SCNC: 3.9 MMOL/L (ref 3.5–5.2)
PROT SERPL-MCNC: 6.7 G/DL (ref 6–8.5)
RBC # BLD AUTO: 4.64 X10E6/UL (ref 3.77–5.28)
SODIUM SERPL-SCNC: 139 MMOL/L (ref 134–144)
T4 FREE SERPL-MCNC: 1.06 NG/DL (ref 0.82–1.77)
TSH SERPL DL<=0.005 MIU/L-ACNC: 4.08 UIU/ML (ref 0.45–4.5)
WBC # BLD AUTO: 10.4 X10E3/UL (ref 3.4–10.8)

## 2018-05-29 ENCOUNTER — TELEPHONE (OUTPATIENT)
Dept: FAMILY MEDICINE CLINIC | Age: 58
End: 2018-05-29

## 2018-05-29 NOTE — TELEPHONE ENCOUNTER
I told patient she was doing right things and to make sure she follows [facility]. Gave patient signs of infection she should be looking for.

## 2018-10-13 DIAGNOSIS — F41.9 ANXIETY: ICD-10-CM

## 2018-10-15 RX ORDER — ALPRAZOLAM 0.5 MG/1
0.5 TABLET ORAL
Qty: 90 TAB | Refills: 0 | Status: SHIPPED | OUTPATIENT
Start: 2018-10-15 | End: 2019-01-02 | Stop reason: SDUPTHER

## 2018-10-15 NOTE — TELEPHONE ENCOUNTER
From: Samantha Strauss  To: Diamante Rocha MD  Sent: 10/13/2018 8:45 PM EDT  Subject: Medication Renewal Request    Original authorizing provider: Diamante Rocha MD    [facility] Heather Michael would like a refill of the following medications:  ALPRAZolam Verneice Reagin) 0.5 mg tablet Diamante Rocha MD]    Preferred pharmacy: [place] [id number], [address]    Comment:  Please refill, send to [facility]

## 2018-10-16 ENCOUNTER — DOCUMENTATION ONLY (OUTPATIENT)
Dept: FAMILY MEDICINE CLINIC | Age: 58
End: 2018-10-16

## 2018-12-10 ENCOUNTER — HOSPITAL ENCOUNTER (EMERGENCY)
Age: 58
Discharge: HOME | End: 2018-12-11
Attending: EMERGENCY MEDICINE
Payer: COMMERCIAL

## 2018-12-10 ENCOUNTER — APPOINTMENT (OUTPATIENT)
Dept: GENERAL RADIOLOGY | Age: 58
End: 2018-12-10
Attending: EMERGENCY MEDICINE
Payer: COMMERCIAL

## 2018-12-10 VITALS
SYSTOLIC BLOOD PRESSURE: 135 MMHG | WEIGHT: 165.12 LBS | HEART RATE: 80 BPM | HEIGHT: 60 IN | DIASTOLIC BLOOD PRESSURE: 71 MMHG | TEMPERATURE: 97.8 F | OXYGEN SATURATION: 98 % | BODY MASS INDEX: 32.42 KG/M2 | RESPIRATION RATE: 16 BRPM

## 2018-12-10 DIAGNOSIS — R07.9 CHEST PAIN, UNSPECIFIED TYPE: Primary | ICD-10-CM

## 2018-12-10 LAB
ALBUMIN SERPL-MCNC: 3.9 G/DL (ref 3.5–5)
ALBUMIN/GLOB SERPL: 1.2 {RATIO} (ref 1.1–2.2)
ALP SERPL-CCNC: 56 U/L (ref 45–117)
ALT SERPL-CCNC: 25 U/L (ref 12–78)
ANION GAP SERPL CALC-SCNC: 6 MMOL/L (ref 5–15)
AST SERPL-CCNC: 11 U/L (ref 15–37)
BASOPHILS # BLD: 0.1 K/UL (ref 0–0.1)
BASOPHILS NFR BLD: 1 % (ref 0–1)
BILIRUB SERPL-MCNC: 0.4 MG/DL (ref 0.2–1)
BUN SERPL-MCNC: 15 MG/DL (ref 6–20)
BUN/CREAT SERPL: 23 (ref 12–20)
CALCIUM SERPL-MCNC: 8.7 MG/DL (ref 8.5–10.1)
CHLORIDE SERPL-SCNC: 106 MMOL/L (ref 97–108)
CK SERPL-CCNC: 107 U/L (ref 26–192)
CO2 SERPL-SCNC: 27 MMOL/L (ref 21–32)
CREAT SERPL-MCNC: 0.64 MG/DL (ref 0.55–1.02)
DIFFERENTIAL METHOD BLD: ABNORMAL
EOSINOPHIL # BLD: 0.2 K/UL (ref 0–0.4)
EOSINOPHIL NFR BLD: 2 % (ref 0–7)
ERYTHROCYTE [DISTWIDTH] IN BLOOD BY AUTOMATED COUNT: 13.1 % (ref 11.5–14.5)
GLOBULIN SER CALC-MCNC: 3.2 G/DL (ref 2–4)
GLUCOSE SERPL-MCNC: 110 MG/DL (ref 65–100)
HCT VFR BLD AUTO: 40.5 % (ref 35–47)
HGB BLD-MCNC: 13.5 G/DL (ref 11.5–16)
IMM GRANULOCYTES # BLD: 0 K/UL (ref 0–0.04)
IMM GRANULOCYTES NFR BLD AUTO: 0 % (ref 0–0.5)
LYMPHOCYTES # BLD: 1.8 K/UL (ref 0.8–3.5)
LYMPHOCYTES NFR BLD: 17 % (ref 12–49)
MCH RBC QN AUTO: 29.5 PG (ref 26–34)
MCHC RBC AUTO-ENTMCNC: 33.3 G/DL (ref 30–36.5)
MCV RBC AUTO: 88.6 FL (ref 80–99)
MONOCYTES # BLD: 1.3 K/UL (ref 0–1)
MONOCYTES NFR BLD: 11 % (ref 5–13)
NEUTS SEG # BLD: 7.7 K/UL (ref 1.8–8)
NEUTS SEG NFR BLD: 69 % (ref 32–75)
NRBC # BLD: 0 K/UL (ref 0–0.01)
NRBC BLD-RTO: 0 PER 100 WBC
PLATELET # BLD AUTO: 283 K/UL (ref 150–400)
PMV BLD AUTO: 9.7 FL (ref 8.9–12.9)
POTASSIUM SERPL-SCNC: 3.4 MMOL/L (ref 3.5–5.1)
PROT SERPL-MCNC: 7.1 G/DL (ref 6.4–8.2)
RBC # BLD AUTO: 4.57 M/UL (ref 3.8–5.2)
SODIUM SERPL-SCNC: 139 MMOL/L (ref 136–145)
TROPONIN I SERPL-MCNC: <0.05 NG/ML
TROPONIN I SERPL-MCNC: <0.05 NG/ML
WBC # BLD AUTO: 11.1 K/UL (ref 3.6–11)

## 2018-12-10 PROCEDURE — 36415 COLL VENOUS BLD VENIPUNCTURE: CPT

## 2018-12-10 PROCEDURE — 71046 X-RAY EXAM CHEST 2 VIEWS: CPT

## 2018-12-10 PROCEDURE — 93005 ELECTROCARDIOGRAM TRACING: CPT

## 2018-12-10 PROCEDURE — 99285 EMERGENCY DEPT VISIT HI MDM: CPT

## 2018-12-10 PROCEDURE — 85025 COMPLETE CBC W/AUTO DIFF WBC: CPT

## 2018-12-10 PROCEDURE — 82550 ASSAY OF CK (CPK): CPT

## 2018-12-10 PROCEDURE — 84484 ASSAY OF TROPONIN QUANT: CPT

## 2018-12-10 PROCEDURE — 80053 COMPREHEN METABOLIC PANEL: CPT

## 2018-12-11 LAB
ATRIAL RATE: 79 BPM
ATRIAL RATE: 88 BPM
CALCULATED P AXIS, ECG09: 56 DEGREES
CALCULATED R AXIS, ECG10: 126 DEGREES
CALCULATED R AXIS, ECG10: 41 DEGREES
CALCULATED T AXIS, ECG11: -34 DEGREES
CALCULATED T AXIS, ECG11: 21 DEGREES
DIAGNOSIS, 93000: NORMAL
DIAGNOSIS, 93000: NORMAL
P-R INTERVAL, ECG05: 140 MS
P-R INTERVAL, ECG05: 142 MS
Q-T INTERVAL, ECG07: 396 MS
Q-T INTERVAL, ECG07: 422 MS
QRS DURATION, ECG06: 70 MS
QRS DURATION, ECG06: 78 MS
QTC CALCULATION (BEZET), ECG08: 479 MS
QTC CALCULATION (BEZET), ECG08: 483 MS
VENTRICULAR RATE, ECG03: 79 BPM
VENTRICULAR RATE, ECG03: 88 BPM

## 2018-12-11 NOTE — ED PROVIDER NOTES
EMERGENCY DEPARTMENT HISTORY AND PHYSICAL EXAM 
 
 
Date: 12/10/2018 Patient Name: Joelle Ferrera History of Presenting Illness Chief Complaint Patient presents with  Arm Pain  
  reports that she was out shoveling snow PTA and started to experience pain in her L arm  Chest Pain  
  also reports feeling chest pain, becoming pale, and having 2 episodes of diarrhea after shoveling snow History Provided By: Patient HPI: Joelle Ferrera, 63 y.o. female with PMHx significant for hypothyroidism, HLD and anxiety, presents ambulatory to the ED with cc of sudden onset, intermittent, mild-moderate intensity left sided CP for 20 minutes PTA with associated left arm pain, nausea and diarrhea. CP was described as a dull, aching sensation. Pt reports she was shoveling snow when felt the onset of her left arm pain and CP, followed by nausea and the immediate need to go to the bathroom where she experienced diarrhea. She noted concern for a cardiac event prompted the trip to the ED for evaluation. Pt denies smoking or drinking. She denies a history of DM or HTN. Pt specifically denies SOB, vomiting, a fever or chills. There are no other complaints, changes, or physical findings at this time. PCP: Na Paz MD 
 
Current Outpatient Medications Medication Sig Dispense Refill  ALPRAZolam (XANAX) 0.5 mg tablet Take 1 Tab by mouth nightly as needed. Max Daily Amount: 0.5 mg. 90 Tab 0  cholecalciferol, VITAMIN D3, (VITAMIN D3) 5,000 unit tab tablet Take  by mouth daily.  triamcinolone acetonide (KENALOG) 0.1 % ointment Apply  to affected area two (2) times a day. use thin layer 80 g 0  
 thyroid, Pork, (ARMOUR THYROID) 90 mg tablet Take 1 Tab by mouth daily. 90 Tab 3  
 estradiol-norethindrone (COMBIPATCH) 0.05-0.25 mg/24 hr 1 Patch by TransDERmal route every Monday. Past History Past Medical History: 
Past Medical History:  
Diagnosis Date  Anxiety  Asthma 2008 allergy induced in [place]  Cancer ([address]   
 basal cell 20 years ago upper abdomen  Foot fracture  Hyperlipidemia  Hypothyroidism  Interstitial cystitis Dr. Lila Swain at [facility] Menopausal disorder  Other ill-defined conditions(039.89)   
 kidney stones in [place]  PONV (postoperative nausea and vomiting) severe nausea 1x in the past  
 Psychiatric disorder 11/10  
 anxiety attack  Thyroid disease Past Surgical History: 
Past Surgical History:  
Procedure Laterality Date  BREAST SURGERY PROCEDURE UNLISTED    
 left breast lumpectomy [place]  HX DILATION AND CURETTAGE    
 miscarriage x1  
 HX GI  2010  
 cholecystectomy  HX GYN    
 laparoscopy for fertility  HX OTHER SURGICAL  2006  
 hemithyroidectomy right, gall bladder removal  
 HX TONSILLECTOMY   Family History: 
Family History Problem Relation Age of Onset  Cancer Mother  Heart Disease Father  Heart Disease Brother Social History: 
Social History Tobacco Use  Smoking status: Former Smoker Packs/day: 1.00 Years: 12.00 Pack years: 12.00 Last attempt to quit: 1990 Years since quittin.8  Smokeless tobacco: Never Used Substance Use Topics  Alcohol use: No  
  Alcohol/week: 0.0 oz  
  Comment: rarely  Drug use: No  
 
 
Allergies: Allergies Allergen Reactions  Adhesive Other (comments) Eat skin off  Codeine Anaphylaxis  Darvon [Propoxyphene] Itching  Percocet [Oxycodone-Acetaminophen] Itching  Sulfa (Sulfonamide Antibiotics) Hives  Vicodin [Hydrocodone-Acetaminophen] Itching  Avocado Swelling  Demerol [Meperidine] Itching  Dilaudid [Hydromorphone] Itching  Lexapro [Escitalopram] Other (comments)  
  shake Review of Systems Review of Systems Constitutional: Negative for chills and fever. HENT: Negative for congestion and sore throat. Eyes: Negative for visual disturbance. Respiratory: Negative for cough and shortness of breath. Cardiovascular: Positive for chest pain. Negative for leg swelling. Gastrointestinal: Positive for diarrhea and nausea. Negative for abdominal pain and blood in stool. Endocrine: Negative for polyuria. Genitourinary: Negative for dysuria, flank pain, vaginal bleeding and vaginal discharge. Musculoskeletal: Positive for myalgias. Skin: Negative for rash. Allergic/Immunologic: Negative for immunocompromised state. Neurological: Negative for weakness and headaches. Psychiatric/Behavioral: Negative for confusion. Physical Exam  
Physical Exam  
Constitutional: She is oriented to person, place, and time. She appears well-developed and well-nourished. HENT:  
Head: Normocephalic and atraumatic. Moist mucous membranes Eyes: Conjunctivae are normal. Pupils are equal, round, and reactive to light. Right eye exhibits no discharge. Left eye exhibits no discharge. Neck: Normal range of motion. Neck supple. No tracheal deviation present. Cardiovascular: Normal rate, regular rhythm and normal heart sounds. No murmur heard. Pulmonary/Chest: Effort normal and breath sounds normal. No respiratory distress. She has no wheezes. She has no rales. Abdominal: Soft. Bowel sounds are normal. There is no tenderness. There is no rebound and no guarding. Musculoskeletal: Normal range of motion. She exhibits no edema, tenderness or deformity. Neurological: She is alert and oriented to person, place, and time. Skin: Skin is warm and dry. No rash noted. No erythema. Psychiatric: Her behavior is normal.  
Nursing note and vitals reviewed. Diagnostic Study Results Labs - Recent Results (from the past 12 hour(s)) EKG, 12 LEAD, INITIAL Collection Time: 12/10/18  7:16 PM  
Result Value Ref Range Ventricular Rate 88 BPM  
 Atrial Rate 88 BPM  
 P-R Interval 142 ms QRS Duration 78 ms Q-T Interval 396 ms QTC Calculation (Bezet) 479 ms Calculated R Axis 126 degrees Calculated T Axis -34 degrees Diagnosis Normal sinus rhythm Low voltage QRS Anterolateral infarct , age undetermined When compared with ECG of 19-NOV-2010 08:05, 
QRS axis shifted right Anterior infarct is now present Anterolateral infarct is now present Nonspecific T wave abnormality, worse in Inferior leads T wave inversion now evident in Anterior leads CBC WITH AUTOMATED DIFF Collection Time: 12/10/18  7:29 PM  
Result Value Ref Range WBC 11.1 (H) 3.6 - 11.0 K/uL  
 RBC 4.57 3.80 - 5.20 M/uL  
 HGB 13.5 11.5 - 16.0 g/dL HCT 40.5 35.0 - 47.0 % MCV 88.6 80.0 - 99.0 FL  
 MCH 29.5 26.0 - 34.0 PG  
 MCHC 33.3 30.0 - 36.5 g/dL  
 RDW 13.1 11.5 - 14.5 % PLATELET 948 976 - 624 K/uL MPV 9.7 8.9 - 12.9 FL  
 NRBC 0.0 0  WBC ABSOLUTE NRBC 0.00 0.00 - 0.01 K/uL NEUTROPHILS 69 32 - 75 % LYMPHOCYTES 17 12 - 49 % MONOCYTES 11 5 - 13 % EOSINOPHILS 2 0 - 7 % BASOPHILS 1 0 - 1 % IMMATURE GRANULOCYTES 0 0.0 - 0.5 % ABS. NEUTROPHILS 7.7 1.8 - 8.0 K/UL  
 ABS. LYMPHOCYTES 1.8 0.8 - 3.5 K/UL  
 ABS. MONOCYTES 1.3 (H) 0.0 - 1.0 K/UL  
 ABS. EOSINOPHILS 0.2 0.0 - 0.4 K/UL  
 ABS. BASOPHILS 0.1 0.0 - 0.1 K/UL  
 ABS. IMM. GRANS. 0.0 0.00 - 0.04 K/UL  
 DF AUTOMATED METABOLIC PANEL, COMPREHENSIVE Collection Time: 12/10/18  7:29 PM  
Result Value Ref Range Sodium 139 136 - 145 mmol/L Potassium 3.4 (L) 3.5 - 5.1 mmol/L Chloride 106 97 - 108 mmol/L  
 CO2 27 21 - 32 mmol/L Anion gap 6 5 - 15 mmol/L Glucose 110 (H) 65 - 100 mg/dL BUN 15 6 - 20 MG/DL Creatinine 0.64 0.55 - 1.02 MG/DL  
 BUN/Creatinine ratio 23 (H) 12 - 20 GFR est AA >60 >60 ml/min/1.73m2 GFR est non-AA >60 >60 ml/min/1.73m2 Calcium 8.7 8.5 - 10.1 MG/DL  Bilirubin, total 0.4 0.2 - 1.0 MG/DL  
 ALT (SGPT) 25 12 - 78 U/L  
 AST (SGOT) 11 (L) 15 - 37 U/L  
 Alk. phosphatase 56 45 - 117 U/L Protein, total 7.1 6.4 - 8.2 g/dL Albumin 3.9 3.5 - 5.0 g/dL Globulin 3.2 2.0 - 4.0 g/dL A-G Ratio 1.2 1.1 - 2.2    
TROPONIN I Collection Time: 12/10/18  7:29 PM  
Result Value Ref Range Troponin-I, Qt. <0.05 <0.05 ng/mL CK W/ REFLX CKMB Collection Time: 12/10/18  7:29 PM  
Result Value Ref Range  26 - 192 U/L  
EKG, 12 LEAD, SUBSEQUENT Collection Time: 12/10/18  9:59 PM  
Result Value Ref Range Ventricular Rate 79 BPM  
 Atrial Rate 79 BPM  
 P-R Interval 140 ms QRS Duration 70 ms Q-T Interval 422 ms QTC Calculation (Bezet) 483 ms Calculated P Axis 56 degrees Calculated R Axis 41 degrees Calculated T Axis 21 degrees Diagnosis Normal sinus rhythm Low voltage QRS When compared with ECG of 10-DEC-2018 19:16, 
MANUAL COMPARISON REQUIRED, DATA IS UNCONFIRMED 
  
TROPONIN I Collection Time: 12/10/18 10:30 PM  
Result Value Ref Range Troponin-I, Qt. <0.05 <0.05 ng/mL Radiologic Studies -  
XR CHEST PA LAT Final Result IMPRESSION:  
No acute process. CXR Results  (Last 48 hours) 12/10/18 2110  XR CHEST PA LAT Final result Impression:  IMPRESSION:  
No acute process. Narrative:  INDICATION:   chest pain and left arm pain EXAM:  PA and Lateral Chest Radiographs COMPARISON: None FINDINGS:  
   
PA and lateral views of the chest demonstrate a normal cardiomediastinal  
silhouette. The lungs are adequately expanded. There is no edema, effusion,  
consolidation, or pneumothorax. The osseous structures are unremarkable. Medical Decision Making I am the first provider for this patient. I reviewed the vital signs, available nursing notes, past medical history, past surgical history, family history and social history. Vital Signs-Reviewed the patient's vital signs. Patient Vitals for the past 12 hrs: 
 Temp Pulse Resp BP SpO2 12/10/18 2230  80 16 135/71 98 % 12/10/18 2200  82 26 135/74 98 % 12/10/18 2145  81 16 143/75 99 % 12/10/18 1911 97.8 °F (36.6 °C) 88 16 174/77 98 % Pulse Oximetry Analysis - 98% on RA Cardiac Monitor:  
Rate: 88 bpm 
Rhythm: Normal Sinus Rhythm EKG interpretation: (Preliminary) 1916 Rhythm: normal sinus rhythm; and regular . Rate (approx.): 88; Axis: normal; MT interval: normal; QRS interval: Anterior Q waves, new; ST/T wave: normal, no STEMI. Written by Sandrita Alba ED Scribe, as dictated by Andrei Monge DO. Records Reviewed: Nursing Notes and Old Medical Records Provider Notes (Medical Decision Making):  
Patient presents with CP. DDx:  ACS, Aortic dissection, PNA, PE, PTX, pericarditis, myocarditis, GERD, costochondritis, anxiety. Will obtain labs, CXR, EKG. ED Course:  
Initial assessment performed. The patients presenting problems have been discussed, and they are in agreement with the care plan formulated and outlined with them. I have encouraged them to ask questions as they arise throughout their visit. ED Course as of Dec 10 2352 Mon Dec 10, 2018  
2156 Pt states if second troponin is negative she wants to go home and will follow up outpatient with cardiology. If it is positive she will consent to admission.  [AS] ED Course User Index [AS] Mariama Noe  
 
progress note: 
8:53 PM 
Discussed recommendation for admission with patient for ACS rule out. Heart score is 5 which puts her at higher risk for major acute coronary event. Patient aware of risks. Patient aware that we cannot completely assess her risk of CAD here in the ED and hospitalization recommended for ACS rule out. She is not sure at this time she wants to be hospitalized even knowing the risk. EKG interpretation: 2159 Rhythm: normal sinus rhythm; and regular . Rate (approx.): 79;  Axis: normal; P wave: normal; QRS interval: normal ; ST/T wave: normal;  
 QRS 70 ms,  ms, QTc 483 ms. Written by Tomas Durant ED Scribe, as dictated by Lauryn Holcomb DO 
 
SIGN OUT: 
9:57 PM 
Patient's presentation, labs/imaging and plan of care was reviewed with Clementine Shearer DO as part of sign out. They will obtain second troponin and determine disposition as part of the plan discussed with the patient. Clementine Shearer DO's assistance in completion of this plan is greatly appreciated but it should be noted that I will be the provider of record for this patient. Lauryn Holcomb DO 
 
11:50 PM 
Troponin negative x 2. Advised close follow up. Anticipate discharge. Critical Care Time:  
0 Disposition: 
DISCHARGE NOTE 
11:55 PM 
The patient has been re-evaluated and is ready for discharge. Reviewed available results with patient. Counseled pt on diagnosis and care plan. Pt has expressed understanding, and all questions have been answered. Pt agrees with plan and agrees to follow up as recommended, or return to the ED if their symptoms worsen. Discharge instructions have been provided and explained to the pt, along with reasons to return to the ED. PLAN: 
1. Current Discharge Medication List  
  
 
2. Follow-up Information Follow up With Specialties Details Why Contact Info Herminia Veloz MD Cardiology, [address] [facility]   [address] [place] [address] 
[phone] Return to ED if worse Diagnosis Clinical Impression: 1. Chest pain, unspecified type Attestations: This note is prepared by Raghu Araujo, acting as Scribe for Lauryn Holcomb DO. Lauryn Holcomb DO: The scribe's documentation has been prepared under my direction and personally reviewed by me in its entirety. I confirm that the note above accurately reflects all work, treatment, procedures, and medical decision making performed by me.

## 2018-12-11 NOTE — ED NOTES
Patient is resting comfortably, no complaints at this time, ambulatory to the restroom without issue. IV started. Patient awaiting lab redraw at this time.

## 2018-12-11 NOTE — ED NOTES
Shelia Silver DO 
 has done a bedside review of the discharge instructions. The patient is in understanding. The patients line(s) are removed. The patient is dressed, and belongings together for discharge.

## 2018-12-11 NOTE — DISCHARGE INSTRUCTIONS

## 2018-12-12 ENCOUNTER — OFFICE VISIT (OUTPATIENT)
Dept: CARDIOLOGY CLINIC | Age: 58
End: 2018-12-12

## 2018-12-12 VITALS
BODY MASS INDEX: 31.94 KG/M2 | SYSTOLIC BLOOD PRESSURE: 130 MMHG | DIASTOLIC BLOOD PRESSURE: 86 MMHG | HEIGHT: 60 IN | WEIGHT: 162.7 LBS | HEART RATE: 76 BPM | RESPIRATION RATE: 16 BRPM | OXYGEN SATURATION: 97 %

## 2018-12-12 DIAGNOSIS — R20.0 NUMBNESS AND TINGLING IN LEFT ARM: ICD-10-CM

## 2018-12-12 DIAGNOSIS — R20.2 NUMBNESS AND TINGLING IN LEFT ARM: ICD-10-CM

## 2018-12-12 DIAGNOSIS — R07.9 CHEST PAIN, UNSPECIFIED TYPE: Primary | ICD-10-CM

## 2018-12-12 RX ORDER — LANOLIN ALCOHOL/MO/W.PET/CERES
5000 CREAM (GRAM) TOPICAL DAILY
COMMUNITY
End: 2022-06-23

## 2018-12-12 NOTE — PROGRESS NOTES
Nic Barreto DNP, ANP-BC  Subjective/HPI:     Stephanie Murcia is a 63 y.o. female is here for emergency room follow-up where patient presented with left-sided chest pain numbness tingling in the left arm as well as nausea and diaphoresis. This was precipitated by shoveling snow. ER workup was negative, since her ER visit she has not had any chest pain however in general just does not feel well.     PCP Provider  Melba Martinez MD  Past Medical History:   Diagnosis Date    Anxiety     Asthma 2008    allergy induced in [address] [facility])     basal cell 20 years ago upper abdomen    Foot fracture     Hyperlipidemia     Hypothyroidism     Interstitial cystitis     Dr. Cindy Zhang at [facility] Menopausal disorder     Other ill-defined conditions(043.08)     kidney stones in pass    PONV (postoperative nausea and vomiting)     severe nausea 1x in the past    Psychiatric disorder 11/10    anxiety attack    Thyroid disease       Past Surgical History:   Procedure Laterality Date    BREAST SURGERY PROCEDURE UNLISTED      left breast lumpectomy    HX ADENOIDECTOMY  1977    HX DILATION AND CURETTAGE      miscarriage x1    HX GI  2010    cholecystectomy    HX GYN      laparoscopy for fertility    HX OTHER SURGICAL  2006    hemithyroidectomy right, gall bladder removal    HX TONSILLECTOMY  1977     Allergies   Allergen Reactions    Adhesive Other (comments)     Eat skin off    Codeine Anaphylaxis    Darvon [Propoxyphene] Itching    Percocet [Oxycodone-Acetaminophen] Itching    Sulfa (Sulfonamide Antibiotics) Hives    Vicodin [Hydrocodone-Acetaminophen] Itching    Avocado Swelling    Demerol [Meperidine] Itching    Dilaudid [Hydromorphone] Itching    Lexapro [Escitalopram] Other (comments)     shake      Family History   Problem Relation Age of Onset    Cancer Mother     Heart Disease Father     Heart Disease Brother       Current Outpatient Medications   Medication Sig    cyanocobalamin (VITAMIN B-12) 1,000 mcg tablet Take 5,000 mcg by mouth daily.  ALPRAZolam (XANAX) 0.5 mg tablet Take 1 Tab by mouth nightly as needed. Max Daily Amount: 0.5 mg.    cholecalciferol, VITAMIN D3, (VITAMIN D3) 5,000 unit tab tablet Take  by mouth daily.  thyroid, Pork, (ARMOUR THYROID) 90 mg tablet Take 1 Tab by mouth daily.  estradiol-norethindrone (COMBIPATCH) 0.05-0.25 mg/24 hr 1 Patch by TransDERmal route two (2) times a week.  triamcinolone acetonide (KENALOG) 0.1 % ointment Apply  to affected area two (2) times a day. use thin layer     No current facility-administered medications for this visit. Vitals:    18 0918 18 0933   BP: 134/82 130/86   Pulse: 76    Resp: 16    SpO2: 97%    Weight: 162 lb 11.2 oz (73.8 kg)    Height: 5' (1.524 m)      Social History     Socioeconomic History    Marital status:      Spouse name: Not on file    Number of children: Not on file    Years of education: Not on file    Highest education level: Not on file   Social Needs    Financial resource strain: Not on file    Food insecurity - worry: Not on file    Food insecurity - inability: Not on file   [employer] needs - medical: Not on file   [employer] needs - non-medical: Not on file   Occupational History    Not on file   Tobacco Use    Smoking status: Former Smoker     Packs/day: 1.00     Years: 12.00     Pack years: 12.00     Types: Cigarettes     Last attempt to quit: 1990     Years since quittin.8    Smokeless tobacco: Never Used   Substance and Sexual Activity    Alcohol use: No     Alcohol/week: 0.0 oz     Comment: rarely    Drug use: No    Sexual activity: Yes   Other Topics Concern    Not on file   Social History Narrative    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     Review of Symptoms:    General: Pt denies excessive weight gain or loss. Pt is able to conduct ADL's  HEENT: Denies blurred vision, headaches, epistaxis and difficulty swallowing. Respiratory: Denies resting shortness of breath, +GONZALEZ, no wheezing or stridor. Cardiovascular: + chest pain ,rare palpitations, edema or PND  Gastrointestinal: Denies poor appetite, indigestion, abdominal pain or blood in stool  Musculoskeletal: Denies pain or swelling from muscles or joints  Neurologic: Denies tremor, paresthesias, or sensory motor disturbance  Skin: Denies rash, itching or texture change. Physical Exam:      General: Well developed, in no acute distress, cooperative and alert  HEENT: No carotid bruits, no JVD, trach is midline. Neck Supple, PEERL, EOM intact. Heart:  Normal S1/S2 negative S3 or S4. Regular, no murmur, gallop or rub.   Respiratory: Clear bilaterally x 4, no wheezing or rales  Abdomen:   Soft, non-tender, no masses, bowel sounds are active.   Extremities:  No edema, normal cap refill, no cyanosis, atraumatic. Neuro: A&Ox3, speech clear, gait stable. Skin: Skin color is normal. No rashes or lesions.  Non diaphoretic  Vascular: 2+ pulses symmetric in all extremities    Cardiographics    ECG: Normal sinus rhythm  Results for orders placed or performed during the hospital encounter of 12/10/18   EKG, 12 LEAD, INITIAL   Result Value Ref Range    Ventricular Rate 88 BPM    Atrial Rate 88 BPM    P-R Interval 142 ms    QRS Duration 78 ms    Q-T Interval 396 ms    QTC Calculation (Bezet) 479 ms    Calculated R Axis 126 degrees    Calculated T Axis -34 degrees    Diagnosis       Normal sinus rhythm  Low voltage QRS  Poor R-wave Progression (consider lead placement or loss of anterior forces)  Confirmed by Al Pizarro ([id number]) on 12/11/2018 3:45:32 PM           Cardiology Labs:  Lab Results   Component Value Date/Time    Cholesterol, total 176 11/10/2017 02:25 PM    HDL Cholesterol 43 11/10/2017 02:25 PM    LDL, calculated 98 11/10/2017 02:25 PM    Triglyceride 174 (H) 11/10/2017 02:25 PM       Lab Results   Component Value Date/Time    Sodium 139 12/10/2018 07:29 PM    Potassium 3.4 (L) 12/10/2018 07:29 PM    Chloride 106 12/10/2018 07:29 PM    CO2 27 12/10/2018 07:29 PM    Anion gap 6 12/10/2018 07:29 PM    Glucose 110 (H) 12/10/2018 07:29 PM    BUN 15 12/10/2018 07:29 PM    Creatinine 0.64 12/10/2018 07:29 PM    BUN/Creatinine ratio 23 (H) 12/10/2018 07:29 PM    GFR est AA >60 12/10/2018 07:29 PM    GFR est non-AA >60 12/10/2018 07:29 PM    Calcium 8.7 12/10/2018 07:29 PM    Bilirubin, total 0.4 12/10/2018 07:29 PM    AST (SGOT) 11 (L) 12/10/2018 07:29 PM    Alk. phosphatase 56 12/10/2018 07:29 PM    Protein, total 7.1 12/10/2018 07:29 PM    Albumin 3.9 12/10/2018 07:29 PM    Globulin 3.2 12/10/2018 07:29 PM    A-G Ratio 1.2 12/10/2018 07:29 PM    ALT (SGPT) 25 12/10/2018 07:29 PM           Assessment:     Assessment:     Diagnoses and all orders for this visit:    1. Chest pain, unspecified type  -     AMB POC EKG ROUTINE W/ 12 LEADS, INTER & REP  -     ECHO TTE STRESS COMP W OR WO CONTR; Future    2. Numbness and tingling in left arm  -     ECHO TTE STRESS COMP W OR WO CONTR; Future        ICD-10-CM ICD-9-CM    1. Chest pain, unspecified type R07.9 786.50 AMB POC EKG ROUTINE W/ 12 LEADS, INTER & REP      ECHO TTE STRESS COMP W OR WO CONTR   2. Numbness and tingling in left arm R20.0 782.0 ECHO TTE STRESS COMP W OR WO CONTR    R20.2       Orders Placed This Encounter    AMB POC EKG ROUTINE W/ 12 LEADS, INTER & REP     Order Specific Question:   Reason for Exam:     Answer:   routine    ECHO TTE STRESS COMP W OR WO CONTR     Standing Status:   Future     Standing Expiration Date:   12/12/2019     Order Specific Question:   Reason for Exam:     Answer:   chest pain     Order Specific Question:   Contrast Enhancement (Bubble Study, Definity, Optison) may be used if criteria listed in established evidence-based protocol has been identified.      Answer: Yes    cyanocobalamin (VITAMIN B-12) 1,000 mcg tablet     Sig: Take 5,000 mcg by mouth daily. Plan:     Patient is a 58-year-old female who experienced chest tightness left arm pain nausea diaphoresis with shoveling snow. Seen at [facility] [address] ER with negative findings. Cardiac risk factors include postmenopausal female with family history of atherosclerotic heart disease father at age [de-identified] had CABG x4. Will evaluate symptoms with stress echocardiogram.  Follow-up when testing complete    Pt. Seen and examined and discussed with NP. Agree with NP note above. Stress echo to assess Sx. Diana Jacome MD    This note was created using voice recognition software. Despite editing, there may be syntax errors.

## 2018-12-12 NOTE — PROGRESS NOTES
1. Have you been to the ER, urgent care clinic since your last visit? Hospitalized since your last visit? Seen in ER on 12/10/18 for chest pain. 2. Have you seen or consulted any other health care providers outside of the [address] [place] [address] since your last visit? Include any pap smears or colon screening.    No.      Chief Complaint   Patient presents with    New Patient     went to ER on 12/10/18 for chest pain- soboe, heart palps, chest discomfort that radiates to her back

## 2018-12-18 ENCOUNTER — CLINICAL SUPPORT (OUTPATIENT)
Dept: CARDIOLOGY CLINIC | Age: 58
End: 2018-12-18

## 2018-12-18 DIAGNOSIS — R20.2 NUMBNESS AND TINGLING IN LEFT ARM: ICD-10-CM

## 2018-12-18 DIAGNOSIS — R20.0 NUMBNESS AND TINGLING IN LEFT ARM: ICD-10-CM

## 2018-12-18 DIAGNOSIS — R07.9 CHEST PAIN, UNSPECIFIED TYPE: ICD-10-CM

## 2019-01-02 ENCOUNTER — OFFICE VISIT (OUTPATIENT)
Dept: FAMILY MEDICINE CLINIC | Age: 59
End: 2019-01-02

## 2019-01-02 VITALS
TEMPERATURE: 98.3 F | SYSTOLIC BLOOD PRESSURE: 122 MMHG | HEIGHT: 60 IN | BODY MASS INDEX: 32.67 KG/M2 | HEART RATE: 69 BPM | WEIGHT: 166.4 LBS | DIASTOLIC BLOOD PRESSURE: 83 MMHG | RESPIRATION RATE: 14 BRPM | OXYGEN SATURATION: 96 %

## 2019-01-02 DIAGNOSIS — F41.9 ANXIETY: ICD-10-CM

## 2019-01-02 DIAGNOSIS — E03.4 HYPOTHYROIDISM DUE TO ACQUIRED ATROPHY OF THYROID: Primary | ICD-10-CM

## 2019-01-02 RX ORDER — LEVOTHYROXINE AND LIOTHYRONINE 57; 13.5 UG/1; UG/1
90 TABLET ORAL DAILY
Qty: 90 TAB | Refills: 0 | Status: SHIPPED | OUTPATIENT
Start: 2019-01-02 | End: 2019-06-10 | Stop reason: SDUPTHER

## 2019-01-02 RX ORDER — ALPRAZOLAM 0.5 MG/1
0.5 TABLET ORAL
Qty: 180 TAB | Refills: 0 | Status: SHIPPED | OUTPATIENT
Start: 2019-01-02 | End: 2019-06-10 | Stop reason: SDUPTHER

## 2019-01-02 NOTE — PROGRESS NOTES
HPI:  63 y.o.  presents for follow up appointment. No hospital, ER or specialist visits since last primary care visit except as noted below. Patient Active Problem List    Diagnosis    Skin lesion of face    Hypothyroidism     S/p right hemithyroidectomy for nodules. Benign - doing well on current dose      Hyperlipidemia    Anxiety - has tried multiple SSRIs with bad reactions. Uses xanax most nights, sometimes with panic attacks. Finds that exercise helps and willing to see a counselor    Menopausal disorder - managed by GYN         Past Medical History:   Diagnosis Date    Anxiety     Asthma 2008    allergy [place] [address] [facility] [address] [facility] [address]     basal cell 20 years ago upper abdomen    Foot fracture     Hyperlipidemia     Hypothyroidism     Interstitial cystitis     [facility]iel Cotter [facility] Menopausal disorder     Other ill-defined conditions(960.92)     kidney stones in [place]    PONV (postoperative nausea and vomiting)     severe nausea 1x in the past    Psychiatric disorder 11/10    anxiety attack    Thyroid disease        Social History     Tobacco Use    Smoking status: Former Smoker     Packs/day: 1.00     Years: 12.00     Pack years: 12.00     Types: Cigarettes     Last attempt to quit: 1990     Years since quittin.9    Smokeless tobacco: Never Used   Substance Use Topics    Alcohol use: No     Alcohol/week: 0.0 oz     Comment: rarely    Drug use: No           Allergies   Allergen Reactions    Adhesive Other (comments)     Eat skin off    Codeine Anaphylaxis    Darvon [Propoxyphene] Itching    Percocet [Oxycodone-Acetaminophen] Itching    Sulfa (Sulfonamide Antibiotics) Hives    Vicodin [Hydrocodone-Acetaminophen] Itching    Avocado Swelling    Demerol [Meperidine] Itching    Dilaudid [Hydromorphone] Itching    Lexapro [Escitalopram] Other (comments)     shake       ROS:  ROS negative except as per HPI.       PE:  Visit Vitals  /83 (BP 1 Location: Left arm, BP Patient Position: Sitting)   Pulse 69   Temp 98.3 °F (36.8 °C) (Oral)   Resp 14   Ht 5' (1.524 m)   Wt 166 lb 6.4 oz (75.5 kg)   LMP 12/23/2011   SpO2 96%   BMI 32.50 kg/m²     Gen: alert, oriented, no acute distress  Eyes: pupils equal round reactive to light, sclera clear, conjunctiva clear  Oral: moist mucus membranes, no oral lesions, no pharyngeal inflammation or exudate  Neck: symmetric normal sized thyroid, no carotid bruits, no jugular vein distention  Resp: no increase work of breathing, lungs clear to ausculation bilaterally, no wheezing, rales or rhonchi  CV: S1, S2 normal.  No murmurs, rubs, or gallops. Abd: soft, not tender, not distended. Normal bowel sounds. Psych: speaks with a normal rate and fluency, no tangential thoughts, normal affect, denies auditory or visual hallucinations, denies suicidal thoughts. Skin: no lesion or rash  Extremities: no cyanosis or edema      Assessment/Plan:    1. Anxiety  Stable on current medication  - ALPRAZolam (XANAX) 0.5 mg tablet; Take 1 Tab by mouth two (2) times daily as needed. Max Daily Amount: 1 mg. Dispense: 180 Tab; Refill: 0    2. Hypothyroidism due to acquired atrophy of thyroid  No changes in medications pending lab results. Health Maintenance reviewed - updated. Orders Placed This Encounter    ALPRAZolam (XANAX) 0.5 mg tablet     Sig: Take 1 Tab by mouth two (2) times daily as needed. Max Daily Amount: 1 mg. Dispense:  180 Tab     Refill:  0    thyroid, Pork, (ARMOUR THYROID) 90 mg tablet     Sig: Take 1 Tab by mouth daily. Dispense:  90 Tab     Refill:  0       Medications Discontinued During This Encounter   Medication Reason    ALPRAZolam (XANAX) 0.5 mg tablet Reorder    thyroid, Pork, (ARMOUR THYROID) 90 mg tablet Reorder       Current Outpatient Medications   Medication Sig Dispense Refill    ALPRAZolam (XANAX) 0.5 mg tablet Take 1 Tab by mouth two (2) times daily as needed. Max Daily Amount: 1 mg.  180 Tab 0    thyroid, Pork, (ARMOUR THYROID) 90 mg tablet Take 1 Tab by mouth daily. 90 Tab 0    cyanocobalamin (VITAMIN B-12) 1,000 mcg tablet Take 5,000 mcg by mouth daily.  cholecalciferol, VITAMIN D3, (VITAMIN D3) 5,000 unit tab tablet Take  by mouth daily.  estradiol-norethindrone (COMBIPATCH) 0.05-0.25 mg/24 hr 1 Patch by TransDERmal route two (2) times a week.  triamcinolone acetonide (KENALOG) 0.1 % ointment Apply  to affected area two (2) times a day. use thin layer 80 g 0       Verbal and written instructions (see AVS) provided. Patient expresses understanding of diagnosis and treatment plan.

## 2019-01-02 NOTE — PROGRESS NOTES
Chief Complaint   Patient presents with    Medication Refill     1. Have you been to the ER, urgent care clinic since your last visit? Hospitalized since your last visit? Yes, [facility] anxiety    2. Have you seen or consulted any other health care providers outside of the [address] [place] [address] since your last visit? Include any pap smears or colon screening. [address] [place] [address] Endocrinology, Rheumatology Dr. Liliana Guerrero. Cardiology Dr. Veena Cooper.

## 2019-06-10 ENCOUNTER — OFFICE VISIT (OUTPATIENT)
Dept: INTERNAL MEDICINE CLINIC | Age: 59
End: 2019-06-10

## 2019-06-10 VITALS
TEMPERATURE: 98.2 F | BODY MASS INDEX: 34.32 KG/M2 | SYSTOLIC BLOOD PRESSURE: 154 MMHG | WEIGHT: 174.8 LBS | DIASTOLIC BLOOD PRESSURE: 86 MMHG | HEART RATE: 80 BPM | HEIGHT: 60 IN | RESPIRATION RATE: 16 BRPM | OXYGEN SATURATION: 98 %

## 2019-06-10 DIAGNOSIS — R79.89 LOW VITAMIN D LEVEL: ICD-10-CM

## 2019-06-10 DIAGNOSIS — E89.0 POSTOPERATIVE HYPOTHYROIDISM: Primary | ICD-10-CM

## 2019-06-10 DIAGNOSIS — E88.81 INSULIN RESISTANCE: ICD-10-CM

## 2019-06-10 DIAGNOSIS — Z12.11 SCREENING FOR COLON CANCER: ICD-10-CM

## 2019-06-10 DIAGNOSIS — Z13.220 SCREENING FOR CHOLESTEROL LEVEL: ICD-10-CM

## 2019-06-10 DIAGNOSIS — E53.8 LOW VITAMIN B12 LEVEL: ICD-10-CM

## 2019-06-10 DIAGNOSIS — F41.9 ANXIETY: ICD-10-CM

## 2019-06-10 RX ORDER — ALPRAZOLAM 0.5 MG/1
0.5 TABLET ORAL
Qty: 60 TAB | Refills: 2 | Status: SHIPPED | OUTPATIENT
Start: 2019-06-10 | End: 2019-12-31 | Stop reason: SDUPTHER

## 2019-06-10 RX ORDER — LEVOTHYROXINE AND LIOTHYRONINE 57; 13.5 UG/1; UG/1
90 TABLET ORAL DAILY
Qty: 90 TAB | Refills: 0 | Status: SHIPPED | OUTPATIENT
Start: 2019-06-10 | End: 2019-06-24 | Stop reason: SDUPTHER

## 2019-06-10 NOTE — PROGRESS NOTES
Visit Vitals  /86 (BP 1 Location: Left arm, BP Patient Position: Sitting)   Pulse 80   Temp 98.2 °F (36.8 °C) (Oral)   Resp 16   Ht 5' (1.524 m)   Wt 174 lb 12.8 oz (79.3 kg)   SpO2 98%   BMI 34.14 kg/m²       Chief Complaint   Patient presents with    New Patient     Pt is here to establish care.         Nina Pérez is a 63 y.o. female    Exam RM: 5D        Health Maintenance Due   Topic Date Due    Shingrix Vaccine Age 50> (1 of 2) 08/23/2011    FOBT Q 1 YEAR AGE 50-75  01/24/2019    BREAST CANCER SCRN MAMMOGRAM  06/29/2019

## 2019-06-10 NOTE — TELEPHONE ENCOUNTER
Requested Prescriptions     Pending Prescriptions Disp Refills    thyroid, Pork, (ARMOUR THYROID) 90 mg tablet 90 Tab 0     Sig: Take 1 Tab by mouth daily.

## 2019-06-10 NOTE — PROGRESS NOTES
Ms. Catrachito Garcia is a new patient who is here to establish care. CC:  New Patient (Pt is here to establish care. )       HPI:    Patient was previously seen by DR Fdee Blank who moved. Reviewed chart and patient S/p right hemithyroidectomy for nodules. Has been on armour thyroid 90mg for many years    Patient has a hx of anxiety and has not done well with SSRIs and currently takes xanax 0.5mg one a day at night. Tried prozac and lexapro and had severe reactions  anxiety was not debilitating until menopause. Patient has difficulty with tolerating medications. Has had anaphylaxis to codeine therefore carries benadryl and epi pen     Patient has been having a hard time with loosing weight. No exercising.  Knows how to eat     Mammogram: reports up to date at [facility]    Last colonoscopy: never done - strongly encouraged  Mother had cancer and  early - she is unsure of the type of cancer    Review of systems:  Constitutional: negative for fever, chills, weight loss, night sweats   Eyes : negative for vision changes, eye pain and discharge  Nose and Throat: negative for tinnitus, sore throat   Cardiovascular: negative for chest pain, palpitations and shortness of breath  Respiratory: negative for shortness of breath, cough and wheezing   Gastroinstestinal: negative for abdominal pain, nausea, vomiting, diarrhea, constipation, and blood in the stool  Musculoskeletal: negative for back ache and joint ache   Genitourinary: negative for dysuria, nocturia, polyuria and hematuria   Neurologic: Negative for focal weakness, numbness or incoordination  Skin: negative for rash, pruritus  Hematologic: negative for easy bruising      Past Medical History:   Diagnosis Date    Anxiety     Asthma 2008    [address] [address] [address] [address]     basal cell 20 years ago upper abdomen    Foot fracture     Hyperlipidemia     Hypothyroidism     Interstitial cystitis     Dr. Tierra Jean at [facility] Menopausal disorder  Other ill-defined conditions(799.89)     kidney stones in [place]    PONV (postoperative nausea and vomiting)     severe nausea 1x in the past    Psychiatric disorder 11/10    anxiety attack    Thyroid disease         Past Surgical History:   Procedure Laterality Date    BREAST SURGERY PROCEDURE UNLISTED      left breast lumpectomy    HX ADENOIDECTOMY  1977    HX DILATION AND CURETTAGE      miscarriage x1    HX GI  2010    cholecystectomy    HX GYN      laparoscopy for fertility    HX OTHER SURGICAL  2006    hemithyroidectomy right, gall bladder removal    HX TONSILLECTOMY  1977       Allergies   Allergen Reactions    Adhesive Other (comments)     Eat skin off    Codeine Anaphylaxis    Darvon [Propoxyphene] Itching    Percocet [Oxycodone-Acetaminophen] Itching    Sulfa (Sulfonamide Antibiotics) Hives    Vicodin [Hydrocodone-Acetaminophen] Itching    Avocado Swelling    Demerol [Meperidine] Itching    Dilaudid [Hydromorphone] Itching    Lexapro [Escitalopram] Other (comments)     shake       Current Outpatient Medications on File Prior to Visit   Medication Sig Dispense Refill    cyanocobalamin (VITAMIN B-12) 1,000 mcg tablet Take 5,000 mcg by mouth daily.  cholecalciferol, VITAMIN D3, (VITAMIN D3) 5,000 unit tab tablet Take  by mouth daily.  estradiol-norethindrone (COMBIPATCH) 0.05-0.25 mg/24 hr 1 Patch by TransDERmal route two (2) times a week.  triamcinolone acetonide (KENALOG) 0.1 % ointment Apply  to affected area two (2) times a day. use thin layer 80 g 0     No current facility-administered medications on file prior to visit. family history includes Cancer in her mother; Heart Disease in her brother and father.     Social History     Socioeconomic History    Marital status:      Spouse name: Not on file    Number of children: Not on file    Years of education: Not on file    Highest education level: Not on file   Occupational History    Not on file Social Needs    Financial resource strain: Not on file    Food insecurity:     Worry: Not on file     Inability: Not on file    Transportation needs:     Medical: Not on file     Non-medical: Not on file   Tobacco Use    Smoking status: Former Smoker     Packs/day: 1.00     Years: 12.00     Pack years: 12.00     Types: Cigarettes     Last attempt to quit: 1990     Years since quittin.3    Smokeless tobacco: Never Used   Substance and Sexual Activity    Alcohol use: No     Alcohol/week: 0.0 oz     Comment: rarely    Drug use: No    Sexual activity: Yes   Lifestyle    Physical activity:     Days per week: Not on file     Minutes per session: Not on file    Stress: Not on file   Relationships    Social connections:     Talks on phone: Not on file     Gets together: Not on file     Attends Holiness service: Not on file     Active member of club or organization: Not on file     Attends meetings of clubs or organizations: Not on file     Relationship status: Not on file    Intimate partner violence:     Fear of current or ex partner: Not on file     Emotionally abused: Not on file     Physically abused: Not on file     Forced sexual activity: Not on file   Other Topics Concern    Not on file   Social History Narrative    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 External ear and canals normal, TMs normal.  Hearing normal to voice. Nose without edema or discharge, normal septum. Lips, teeth, gums normal.  Oropharynx: no erythema, no exudates, no lesions, normal tongue. Neck:  Supple. Thyroid normal size, nontender, without nodules. No carotid bruit.  No masses or lymphadenopathy  Respiratory: no respiratory distress,  no wheezing, no rhonchi, no rales. No chest wall tenderness. Cardiovascular:  RRR, normal S1S2, no murmur. Gastrointestinal: normal bowel sounds, soft, nontender, without masses. No hepatosplenomegaly. Extremities +2 pulses, no edema, normal sensation   Musculoskeletal:  Normal gait. Normal digits and nails. Normal strength and tone, no atrophy, and no abnormal movement. Skin:  No rash, no lesions, no ulcers. Skin warm, normal turgor, without induration or nodules. Neuro:  A and OX4, fluent speech, cranial nerves normal 2-12. Sensation normal to light touch. DTR symmetrical  Psych:  Normal affect      Lab Results   Component Value Date/Time    WBC 11.1 (H) 12/10/2018 07:29 PM    HGB 13.5 12/10/2018 07:29 PM    HCT 40.5 12/10/2018 07:29 PM    PLATELET 538 30/23/8327 07:29 PM    MCV 88.6 12/10/2018 07:29 PM     Lab Results   Component Value Date/Time    Sodium 139 12/10/2018 07:29 PM    Potassium 3.4 (L) 12/10/2018 07:29 PM    Chloride 106 12/10/2018 07:29 PM    CO2 27 12/10/2018 07:29 PM    Anion gap 6 12/10/2018 07:29 PM    Glucose 110 (H) 12/10/2018 07:29 PM    BUN 15 12/10/2018 07:29 PM    Creatinine 0.64 12/10/2018 07:29 PM    BUN/Creatinine ratio 23 (H) 12/10/2018 07:29 PM    GFR est AA >60 12/10/2018 07:29 PM    GFR est non-AA >60 12/10/2018 07:29 PM    Calcium 8.7 12/10/2018 07:29 PM     Lab Results   Component Value Date/Time    Cholesterol, total 176 11/10/2017 02:25 PM    HDL Cholesterol 43 11/10/2017 02:25 PM    LDL, calculated 98 11/10/2017 02:25 PM    VLDL, calculated 35 11/10/2017 02:25 PM    Triglyceride 174 (H) 11/10/2017 02:25 PM     Lab Results   Component Value Date/Time    TSH 4.080 05/10/2018 04:46 PM     No results found for: HBA1C, HGBE8, AYN4TOVK, BCS0MQND, GLV0GLGV  Lab Results   Component Value Date/Time    VITAMIN D, 25-HYDROXY 19.3 (L) 11/10/2017 02:25 PM                   Assessment and Plan:     1.  Postoperative hypothyroidism  - hemithyroidectomy for hx for nodules  - TSH AND FREE T4  - METABOLIC PANEL, COMPREHENSIVE  - CBC WITH AUTOMATED DIFF  - will refill once results are back    2. Anxiety  - did not tolerate SSRIs. Continue current therapy with low dose xanax   - TSH AND FREE T4  - METABOLIC PANEL, COMPREHENSIVE  - CBC WITH AUTOMATED DIFF  - ALPRAZolam (XANAX) 0.5 mg tablet; Take 1 Tab by mouth two (2) times daily as needed for Anxiety. Max Daily Amount: 1 mg. Dispense: 60 Tab; Refill: 2    3. Screening for cholesterol level  - LIPID PANEL    4. Screening for colon cancer  - REFERRAL FOR COLONOSCOPY    5. Insulin resistance  - HEMOGLOBIN A1C W/O EAG      Follow-up and Dispositions    · Return in about 3 months (around 9/10/2019) for hypothyroidism.           Brady Jarvis MD

## 2019-06-11 LAB
ALBUMIN SERPL-MCNC: 4.2 G/DL (ref 3.5–5.5)
ALBUMIN/GLOB SERPL: 1.6 {RATIO} (ref 1.2–2.2)
ALP SERPL-CCNC: 62 IU/L (ref 39–117)
ALT SERPL-CCNC: 15 IU/L (ref 0–32)
AST SERPL-CCNC: 12 IU/L (ref 0–40)
BASOPHILS # BLD AUTO: 0 X10E3/UL (ref 0–0.2)
BASOPHILS NFR BLD AUTO: 0 %
BILIRUB SERPL-MCNC: <0.2 MG/DL (ref 0–1.2)
BUN SERPL-MCNC: 14 MG/DL (ref 6–24)
BUN/CREAT SERPL: 19 (ref 9–23)
CALCIUM SERPL-MCNC: 9.1 MG/DL (ref 8.7–10.2)
CHLORIDE SERPL-SCNC: 103 MMOL/L (ref 96–106)
CHOLEST SERPL-MCNC: 186 MG/DL (ref 100–199)
CO2 SERPL-SCNC: 23 MMOL/L (ref 20–29)
CREAT SERPL-MCNC: 0.72 MG/DL (ref 0.57–1)
EOSINOPHIL # BLD AUTO: 0.3 X10E3/UL (ref 0–0.4)
EOSINOPHIL NFR BLD AUTO: 2 %
ERYTHROCYTE [DISTWIDTH] IN BLOOD BY AUTOMATED COUNT: 13.8 % (ref 12.3–15.4)
GLOBULIN SER CALC-MCNC: 2.6 G/DL (ref 1.5–4.5)
GLUCOSE SERPL-MCNC: 91 MG/DL (ref 65–99)
HBA1C MFR BLD: 5.1 % (ref 4.8–5.6)
HCT VFR BLD AUTO: 40.4 % (ref 34–46.6)
HDLC SERPL-MCNC: 54 MG/DL
HGB BLD-MCNC: 13.5 G/DL (ref 11.1–15.9)
IMM GRANULOCYTES # BLD AUTO: 0 X10E3/UL (ref 0–0.1)
IMM GRANULOCYTES NFR BLD AUTO: 0 %
LDLC SERPL CALC-MCNC: 94 MG/DL (ref 0–99)
LYMPHOCYTES # BLD AUTO: 1.5 X10E3/UL (ref 0.7–3.1)
LYMPHOCYTES NFR BLD AUTO: 14 %
MCH RBC QN AUTO: 29.6 PG (ref 26.6–33)
MCHC RBC AUTO-ENTMCNC: 33.4 G/DL (ref 31.5–35.7)
MCV RBC AUTO: 89 FL (ref 79–97)
MONOCYTES # BLD AUTO: 1.4 X10E3/UL (ref 0.1–0.9)
MONOCYTES NFR BLD AUTO: 13 %
NEUTROPHILS # BLD AUTO: 7.3 X10E3/UL (ref 1.4–7)
NEUTROPHILS NFR BLD AUTO: 71 %
PLATELET # BLD AUTO: 324 X10E3/UL (ref 150–450)
POTASSIUM SERPL-SCNC: 3.7 MMOL/L (ref 3.5–5.2)
PROT SERPL-MCNC: 6.8 G/DL (ref 6–8.5)
RBC # BLD AUTO: 4.56 X10E6/UL (ref 3.77–5.28)
SODIUM SERPL-SCNC: 140 MMOL/L (ref 134–144)
T4 FREE SERPL-MCNC: 0.94 NG/DL (ref 0.82–1.77)
TRIGL SERPL-MCNC: 189 MG/DL (ref 0–149)
TSH SERPL DL<=0.005 MIU/L-ACNC: 1.61 UIU/ML (ref 0.45–4.5)
VLDLC SERPL CALC-MCNC: 38 MG/DL (ref 5–40)
WBC # BLD AUTO: 10.5 X10E3/UL (ref 3.4–10.8)

## 2019-06-24 RX ORDER — LEVOTHYROXINE AND LIOTHYRONINE 57; 13.5 UG/1; UG/1
90 TABLET ORAL DAILY
Qty: 90 TAB | Refills: 1 | Status: SHIPPED | OUTPATIENT
Start: 2019-06-24 | End: 2020-03-10 | Stop reason: SDUPTHER

## 2019-06-25 NOTE — PROGRESS NOTES
Thyroid function is at goal refilled thyroid medication same dose  Normal kidney, liver and blood count  Cholesterol looks good.  Mild elevation in triglycerides work on healthy diet /low fat diet  No diabetes  Message sent on my chart

## 2019-09-13 ENCOUNTER — OFFICE VISIT (OUTPATIENT)
Dept: INTERNAL MEDICINE CLINIC | Age: 59
End: 2019-09-13

## 2019-09-13 VITALS
WEIGHT: 152 LBS | RESPIRATION RATE: 16 BRPM | TEMPERATURE: 98 F | DIASTOLIC BLOOD PRESSURE: 75 MMHG | HEIGHT: 60 IN | BODY MASS INDEX: 29.84 KG/M2 | SYSTOLIC BLOOD PRESSURE: 114 MMHG | HEART RATE: 69 BPM | OXYGEN SATURATION: 98 %

## 2019-09-13 DIAGNOSIS — F41.9 ANXIETY: ICD-10-CM

## 2019-09-13 NOTE — PATIENT INSTRUCTIONS
Need copy of mammogram     Office Policies    Phone calls/patient messages:            Please allow up to 24 hours for someone in the office to contact you about your call or message. Be mindful your provider may be out of the office or your message may require further review. We encourage you to use [employer] for your messages as this is a faster, more efficient way to communicate with our office                         Medication Refills:            Prescription medications require 48-72 business hours to process. We encourage you to use [employer] for your refills. For controlled medications: Please allow 72 business hours to process. Certain medications may require you to  a written prescription at our office. NO narcotic/controlled medications will be prescribed after 4pm Monday through Friday or on weekends              Form/Paperwork Completion:            Please note a $25 fee may incur for all paperwork for completed by our providers. We ask that you allow 7-10 business days. Pre-payment is due prior to picking up/faxing the completed form. You may also download your forms to [employer] to have your doctor print off.

## 2019-09-13 NOTE — PROGRESS NOTES
Ms. Shelli Villarreal is presenting to follow up     CC:  obesity     HPI:    We discussed weight loss last visit     Interstitial cystitis: had a flair up due to colonoscopy. Patient saw urologist and took Heriberto Lolling      S/p right hemithyroidectomy for nodules. Has been on armour thyroid 90mg for many years and TSH has been stable    Recall Patient has a hx of anxiety and has not done well with SSRIs and currently takes xanax 0.5mg one a day at night. Tried prozac and lexapro and had severe reactions  anxiety was not debilitating until menopause. Recall: Patient has difficulty with tolerating medications. Has had anaphylaxis to codeine therefore carries benadryl and epi pen     Patient lost 20 + lbs since joining [employer].     Mammogram: reports up to date at [facility]    Had colonoscopy in the interval    Mother had cancer and  early - she is unsure of the type of cancer    Review of systems:  10 systems reviewed and negative other than HPI    Past Medical History:   Diagnosis Date    Anxiety     Asthma 2008    [place] [address]     basal cell 20 years ago upper abdomen    Foot fracture     Hyperlipidemia     Hypothyroidism     Interstitial cystitis     Dr. Nataly Elias at [facility] Menopausal disorder     Other ill-defined conditions(972.62)     kidney stones in pass    PONV (postoperative nausea and vomiting)     severe nausea 1x in the past    Psychiatric disorder 11/10    anxiety attack    Thyroid disease         Past Surgical History:   Procedure Laterality Date    BREAST SURGERY PROCEDURE UNLISTED      left breast lumpectomy    HX ADENOIDECTOMY      HX DILATION AND CURETTAGE      miscarriage x1    HX GI  2010    cholecystectomy    HX GYN      laparoscopy for fertility    HX OTHER SURGICAL  2006    hemithyroidectomy right, gall bladder removal    HX TONSILLECTOMY         Allergies   Allergen Reactions    Adhesive Other (comments)     Eat skin off    Codeine Anaphylaxis    Darvon [Propoxyphene] Itching    Percocet [Oxycodone-Acetaminophen] Itching    Sulfa (Sulfonamide Antibiotics) Hives    Vicodin [Hydrocodone-Acetaminophen] Itching    Avocado Swelling    Demerol [Meperidine] Itching    Dilaudid [Hydromorphone] Itching    Lexapro [Escitalopram] Other (comments)     shake       Current Outpatient Medications on File Prior to Visit   Medication Sig Dispense Refill    thyroid, Pork, (ARMOUR THYROID) 90 mg tablet Take 1 Tab by mouth daily. 90 Tab 1    ALPRAZolam (XANAX) 0.5 mg tablet Take 1 Tab by mouth two (2) times daily as needed for Anxiety. Max Daily Amount: 1 mg. 60 Tab 2    cyanocobalamin (VITAMIN B-12) 1,000 mcg tablet Take 5,000 mcg by mouth daily.  cholecalciferol, VITAMIN D3, (VITAMIN D3) 5,000 unit tab tablet Take  by mouth daily.  triamcinolone acetonide (KENALOG) 0.1 % ointment Apply  to affected area two (2) times a day. use thin layer 80 g 0    estradiol-norethindrone (COMBIPATCH) 0.05-0.25 mg/24 hr 1 Patch by TransDERmal route two (2) times a week. No current facility-administered medications on file prior to visit. family history includes Cancer in her mother; Heart Disease in her brother and father.     Social History     Socioeconomic History    Marital status:      Spouse name: Not on file    Number of children: Not on file    Years of education: Not on file    Highest education level: Not on file   Occupational History    Not on file   Social Needs    Financial resource strain: Not on file    Food insecurity:     Worry: Not on file     Inability: Not on file    Transportation needs:     Medical: Not on file     Non-medical: Not on file   Tobacco Use    Smoking status: Former Smoker     Packs/day: 1.00     Years: 12.00     Pack years: 12.00     Types: Cigarettes     Last attempt to quit: 1990     Years since quittin.6    Smokeless tobacco: Never Used   Substance and Sexual Activity  Alcohol use: No     Alcohol/week: 0.0 standard drinks     Comment: rarely    Drug use: No    Sexual activity: Yes   Lifestyle    Physical activity:     Days per week: Not on file     Minutes per session: Not on file    Stress: Not on file   Relationships    Social connections:     Talks on phone: Not on file     Gets together: Not on file     Attends Sabianist service: Not on file     Active member of club or organization: Not on file     Attends meetings of clubs or organizations: Not on file     Relationship status: Not on file    Intimate partner violence:     Fear of current or ex partner: Not on file     Emotionally abused: Not on file     Physically abused: Not on file     Forced sexual activity: Not on file   Other Topics Concern    Not on file   Social History Narrative    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 Thyroid normal size, nontender, without nodules. No carotid bruit. No masses or lymphadenopathy  Respiratory: no respiratory distress,  no wheezing, no rhonchi, no rales. No chest wall tenderness. Cardiovascular:  RRR, normal S1S2, no murmur. Gastrointestinal: normal bowel sounds, soft, nontender, without masses. No hepatosplenomegaly. Extremities +2 pulses, no edema, normal sensation   Musculoskeletal:  Normal gait. Normal digits and nails. Normal strength and tone, no atrophy, and no abnormal movement. Skin:  No rash, no lesions, no ulcers. Skin warm, normal turgor, without induration or nodules. Neuro:  A and OX4, fluent speech, cranial nerves normal 2-12. Sensation normal to light touch.   DTR symmetrical  Psych:  Normal affect      Lab Results   Component Value Date/Time WBC 10.5 06/10/2019 02:47 PM    HGB 13.5 06/10/2019 02:47 PM    HCT 40.4 06/10/2019 02:47 PM    PLATELET 631 61/29/1706 02:47 PM    MCV 89 06/10/2019 02:47 PM     Lab Results   Component Value Date/Time    Sodium 140 06/10/2019 02:47 PM    Potassium 3.7 06/10/2019 02:47 PM    Chloride 103 06/10/2019 02:47 PM    CO2 23 06/10/2019 02:47 PM    Anion gap 6 12/10/2018 07:29 PM    Glucose 91 06/10/2019 02:47 PM    BUN 14 06/10/2019 02:47 PM    Creatinine 0.72 06/10/2019 02:47 PM    BUN/Creatinine ratio 19 06/10/2019 02:47 PM    GFR est  06/10/2019 02:47 PM    GFR est non-AA 93 06/10/2019 02:47 PM    Calcium 9.1 06/10/2019 02:47 PM     Lab Results   Component Value Date/Time    Cholesterol, total 186 06/10/2019 02:47 PM    HDL Cholesterol 54 06/10/2019 02:47 PM    LDL, calculated 94 06/10/2019 02:47 PM    VLDL, calculated 38 06/10/2019 02:47 PM    Triglyceride 189 (H) 06/10/2019 02:47 PM     Lab Results   Component Value Date/Time    TSH 1.610 06/10/2019 02:47 PM     Lab Results   Component Value Date/Time    Hemoglobin A1c 5.1 06/10/2019 02:47 PM     Lab Results   Component Value Date/Time    VITAMIN D, 25-HYDROXY 19.3 (L) 11/10/2017 02:25 PM                   Assessment and Plan:     1. Postoperative hypothyroidism  - hemithyroidectomy for hx for nodules  - on armour thyroid 90 mg     2. Anxiety  - did not tolerate SSRIs. Continue current therapy with low dose xanax . No signs of overuse or abuse  - takes one xanax 0.5 mg daily.   - Does not need refill today. 3. Overweight: patient lost weight and is doing well with weight watchers. Lost 20 lbs. No longer in the obese category!           Lainey Ramirez MD

## 2019-12-31 ENCOUNTER — OFFICE VISIT (OUTPATIENT)
Dept: INTERNAL MEDICINE CLINIC | Age: 59
End: 2019-12-31

## 2019-12-31 VITALS
HEIGHT: 60 IN | OXYGEN SATURATION: 98 % | HEART RATE: 75 BPM | WEIGHT: 142.2 LBS | TEMPERATURE: 97.7 F | DIASTOLIC BLOOD PRESSURE: 82 MMHG | RESPIRATION RATE: 18 BRPM | SYSTOLIC BLOOD PRESSURE: 128 MMHG | BODY MASS INDEX: 27.92 KG/M2

## 2019-12-31 DIAGNOSIS — J01.01 ACUTE RECURRENT MAXILLARY SINUSITIS: Primary | ICD-10-CM

## 2019-12-31 DIAGNOSIS — F41.9 ANXIETY: ICD-10-CM

## 2019-12-31 DIAGNOSIS — E53.8 VITAMIN B 12 DEFICIENCY: ICD-10-CM

## 2019-12-31 DIAGNOSIS — E55.9 VITAMIN D DEFICIENCY: ICD-10-CM

## 2019-12-31 DIAGNOSIS — E78.00 HIGH CHOLESTEROL: ICD-10-CM

## 2019-12-31 DIAGNOSIS — E89.0 POSTOPERATIVE HYPOTHYROIDISM: ICD-10-CM

## 2019-12-31 RX ORDER — AMOXICILLIN AND CLAVULANATE POTASSIUM 875; 125 MG/1; MG/1
1 TABLET, FILM COATED ORAL EVERY 12 HOURS
Qty: 14 TAB | Refills: 0 | Status: SHIPPED | OUTPATIENT
Start: 2019-12-31 | End: 2020-07-10 | Stop reason: ALTCHOICE

## 2019-12-31 RX ORDER — ALPRAZOLAM 0.5 MG/1
0.5 TABLET ORAL
Qty: 60 TAB | Refills: 2 | Status: SHIPPED | OUTPATIENT
Start: 2019-12-31 | End: 2020-07-10 | Stop reason: ALTCHOICE

## 2019-12-31 NOTE — LETTER
NOTIFICATION RETURN TO WORK / SCHOOL 
 
12/31/2019 8:23 AM 
 
Ms. Aron Bird 
[facility] [address] 
[facility] [id number] To Whom It May Concern: 
 
Aron Bird is currently under the care of [facility]. She will return to work/school on: 01/02/2020 If there are questions or concerns please have the patient contact our office. Sincerely, Chace Hylton MD

## 2019-12-31 NOTE — PATIENT INSTRUCTIONS
Request mammogram  Office Policies    Phone calls/patient messages:            Please allow up to 24 hours for someone in the office to contact you about your call or message. Be mindful your provider may be out of the office or your message may require further review. We encourage you to use [employer] for your messages as this is a faster, more efficient way to communicate with our office                         Medication Refills:            Prescription medications require 48-72 business hours to process. We encourage you to use [employer] for your refills. For controlled medications: Please allow 72 business hours to process. Certain medications may require you to  a written prescription at our office. NO narcotic/controlled medications will be prescribed after 4pm Monday through Friday or on weekends              Form/Paperwork Completion:            Please note a $25 fee may incur for all paperwork for completed by our providers. We ask that you allow 7-10 business days. Pre-payment is due prior to picking up/faxing the completed form. You may also download your forms to [employer] to have your doctor print off.

## 2019-12-31 NOTE — PROGRESS NOTES
Reviewed record in preparation for visit and have obtained necessary documentation. Identified pt with two pt identifiers(name and ). Chief Complaint   Patient presents with    Anxiety    Sinus Infection       Health Maintenance Due   Topic Date Due    Shingles Vaccine (1 of 2) 2011    Mammogram  2019    Flu Vaccine  2019       Ms. Lawrence Carroll has a reminder for a \"due or due soon\" health maintenance. I have asked that she discuss this further with her primary care provider for follow-up on this health maintenance. Coordination of Care Questionnaire:  :     1) Have you been to an emergency room, urgent care clinic since your last visit? no   Hospitalized since your last visit? no             2) Have you seen or consulted any other health care providers outside of [address] [place] [address] since your last visit? no  (Include any pap smears or colon screenings in this section.)    3) In the event something were to happen to you and you were unable to speak on your behalf, do you have an Advance Directive/ Living Will in place stating your wishes? NO    Do you have an Advance Directive on file? no    4) Are you interested in receiving information on Advance Directives? NO    Patient is accompanied by self I have received verbal consent from Aron Bird to discuss any/all medical information while they are present in the room.

## 2019-12-31 NOTE — PROGRESS NOTES
Ms. Maggy Hernández is presenting to follow up     CC:  obesity     HPI:    We discussed weight loss last visit and she has lost 30 lbs with weight watchers. BMI is now 27    Interstitial cystitis     S/p right hemithyroidectomy for nodules. Has been on armour thyroid 90mg for many years and TSH has been stable    Recall Patient has a hx of anxiety and has not done well with SSRIs and currently takes xanax 0.5mg one a day at night. Tried prozac and lexapro and had severe reactions  anxiety was not debilitating until menopause. She is requesting a refill today       Recall: Patient has difficulty with tolerating medications. Has had anaphylaxis to codeine therefore carries benadryl and epi pen       Mammogram: reports up to date at [facility]    Up to date on colonoscopy    Sinus congestion and fatigue and cough for one week not improving, Sick contacts at work. Denies fever.  Has chills       Mother had cancer and  early - she is unsure of the type of cancer    Review of systems:  10 systems reviewed and negative other than HPI    Past Medical History:   Diagnosis Date    Anxiety     Asthma 2008    [place]ergy induced [address]     basal cell 20 years ago upper abdomen    Foot fracture     Hyperlipidemia     Hypothyroidism     Interstitial cystitis     Dr. Berhane Mooney at [facility] Interstitial cystitis     Menopausal disorder     Other ill-defined conditions(734.96)     kidney stones in [place]    PONV (postoperative nausea and vomiting)     severe nausea 1x in the past    Psychiatric disorder 11/10    anxiety attack    Thyroid disease         Past Surgical History:   Procedure Laterality Date    BREAST SURGERY PROCEDURE UNLISTED      left breast lumpectomy    HX ADENOIDECTOMY      HX DILATION AND CURETTAGE      miscarriage x1    HX GI  2010    cholecystectomy    HX GYN      laparoscopy for fertility    HX OTHER SURGICAL  2006    hemithyroidectomy right, gall bladder removal    HX TONSILLECTOMY  1977       Allergies   Allergen Reactions    Adhesive Other (comments)     Eat skin off    Codeine Anaphylaxis    Codeine Sulfate Anaphylaxis    Darvon [Propoxyphene] Itching    Percocet [Oxycodone-Acetaminophen] Itching    Sulfa (Sulfonamide Antibiotics) Hives    Vicodin [Hydrocodone-Acetaminophen] Itching    Avocado Swelling    Demerol [Meperidine] Itching    Dilaudid [Hydromorphone] Itching    Lexapro [Escitalopram] Other (comments)     shake       Current Outpatient Medications on File Prior to Visit   Medication Sig Dispense Refill    thyroid, Pork, (ARMOUR THYROID) 90 mg tablet Take 1 Tab by mouth daily. 90 Tab 1    ALPRAZolam (XANAX) 0.5 mg tablet Take 1 Tab by mouth two (2) times daily as needed for Anxiety. Max Daily Amount: 1 mg. 60 Tab 2    cyanocobalamin (VITAMIN B-12) 1,000 mcg tablet Take 5,000 mcg by mouth daily.  cholecalciferol, VITAMIN D3, (VITAMIN D3) 5,000 unit tab tablet Take  by mouth daily.  estradiol-norethindrone (COMBIPATCH) 0.05-0.25 mg/24 hr 1 Patch by TransDERmal route two (2) times a week. No current facility-administered medications on file prior to visit. family history includes Cancer in her mother; Heart Disease in her brother and father.     Social History     Socioeconomic History    Marital status:      Spouse name: Not on file    Number of children: Not on file    Years of education: Not on file    Highest education level: Not on file   Occupational History    Not on file   Social Needs    Financial resource strain: Not on file    Food insecurity:     Worry: Not on file     Inability: Not on file    Transportation needs:     Medical: Not on file     Non-medical: Not on file   Tobacco Use    Smoking status: Former Smoker     Packs/day: 1.00     Years: 12.00     Pack years: 12.00     Types: Cigarettes     Last attempt to quit: 1990     Years since quittin.9    Smokeless tobacco: Never Used   Substance and Sexual Activity    Alcohol use: No     Alcohol/week: 0.0 standard drinks     Comment: rarely    Drug use: No    Sexual activity: Yes   Lifestyle    Physical activity:     Days per week: Not on file     Minutes per session: Not on file    Stress: Not on file   Relationships    Social connections:     Talks on phone: Not on file     Gets together: Not on file     Attends Jainism service: Not on file     Active member of club or organization: Not on file     Attends meetings of clubs or organizations: Not on file     Relationship status: Not on file    Intimate partner violence:     Fear of current or ex partner: Not on file     Emotionally abused: Not on file     Physically abused: Not on file     Forced sexual activity: Not on file   Other Topics Concern    Not on file   Social History Narrative    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 Thyroid normal size, nontender, without nodules. No carotid bruit. No masses or lymphadenopathy  Respiratory: no respiratory distress,  no wheezing, no rhonchi, no rales. No chest wall tenderness. Cardiovascular:  RRR, normal S1S2, no murmur. Gastrointestinal: normal bowel sounds, soft, nontender, without masses. No hepatosplenomegaly. Extremities +2 pulses, no edema, normal sensation   Musculoskeletal:  Normal gait. Normal digits and nails. Normal strength and tone, no atrophy, and no abnormal movement. Skin:  No rash, no lesions, no ulcers. Skin warm, normal turgor, without induration or nodules. Neuro:  A and OX4, fluent speech, cranial nerves normal 2-12. Sensation normal to light touch.   DTR symmetrical  Psych:  Normal affect      Lab Results Component Value Date/Time    WBC 10.5 06/10/2019 02:47 PM    HGB 13.5 06/10/2019 02:47 PM    HCT 40.4 06/10/2019 02:47 PM    PLATELET 969 44/46/9235 02:47 PM    MCV 89 06/10/2019 02:47 PM     Lab Results   Component Value Date/Time    Sodium 140 06/10/2019 02:47 PM    Potassium 3.7 06/10/2019 02:47 PM    Chloride 103 06/10/2019 02:47 PM    CO2 23 06/10/2019 02:47 PM    Anion gap 6 12/10/2018 07:29 PM    Glucose 91 06/10/2019 02:47 PM    BUN 14 06/10/2019 02:47 PM    Creatinine 0.72 06/10/2019 02:47 PM    BUN/Creatinine ratio 19 06/10/2019 02:47 PM    GFR est  06/10/2019 02:47 PM    GFR est non-AA 93 06/10/2019 02:47 PM    Calcium 9.1 06/10/2019 02:47 PM     Lab Results   Component Value Date/Time    Cholesterol, total 186 06/10/2019 02:47 PM    HDL Cholesterol 54 06/10/2019 02:47 PM    LDL, calculated 94 06/10/2019 02:47 PM    VLDL, calculated 38 06/10/2019 02:47 PM    Triglyceride 189 (H) 06/10/2019 02:47 PM     Lab Results   Component Value Date/Time    TSH 1.610 06/10/2019 02:47 PM     Lab Results   Component Value Date/Time    Hemoglobin A1c 5.1 06/10/2019 02:47 PM     Lab Results   Component Value Date/Time    VITAMIN D, 25-HYDROXY 19.3 (L) 11/10/2017 02:25 PM                   Assessment and Plan:     1. Postoperative hypothyroidism  - hemithyroidectomy for hx for nodules  - on armour thyroid 90 mg   - check TSH today    2. Anxiety  - did not tolerate SSRIs. Continue current therapy with low dose xanax . No signs of overuse or abuse  - takes one xanax 0.5 mg daily. -refilled today    3. Overweight: patient lost  30 lbs with healthy diet and weight watchers     4.  Sinus infection: symptoms for over 1 week and progressing, prescribed fawad Enciso MD

## 2020-01-01 LAB
25(OH)D3+25(OH)D2 SERPL-MCNC: 46 NG/ML (ref 30–100)
ALBUMIN SERPL-MCNC: 4.1 G/DL (ref 3.5–5.5)
ALBUMIN/GLOB SERPL: 1.9 {RATIO} (ref 1.2–2.2)
ALP SERPL-CCNC: 63 IU/L (ref 39–117)
ALT SERPL-CCNC: 29 IU/L (ref 0–32)
AST SERPL-CCNC: 18 IU/L (ref 0–40)
BASOPHILS # BLD AUTO: 0.1 X10E3/UL (ref 0–0.2)
BASOPHILS NFR BLD AUTO: 1 %
BILIRUB SERPL-MCNC: 0.4 MG/DL (ref 0–1.2)
BUN SERPL-MCNC: 16 MG/DL (ref 6–24)
BUN/CREAT SERPL: 29 (ref 9–23)
CALCIUM SERPL-MCNC: 9 MG/DL (ref 8.7–10.2)
CHLORIDE SERPL-SCNC: 107 MMOL/L (ref 96–106)
CHOLEST SERPL-MCNC: 182 MG/DL (ref 100–199)
CO2 SERPL-SCNC: 22 MMOL/L (ref 20–29)
CREAT SERPL-MCNC: 0.55 MG/DL (ref 0.57–1)
EOSINOPHIL # BLD AUTO: 0.4 X10E3/UL (ref 0–0.4)
EOSINOPHIL NFR BLD AUTO: 3 %
ERYTHROCYTE [DISTWIDTH] IN BLOOD BY AUTOMATED COUNT: 12 % (ref 12.3–15.4)
GLOBULIN SER CALC-MCNC: 2.2 G/DL (ref 1.5–4.5)
GLUCOSE SERPL-MCNC: 86 MG/DL (ref 65–99)
HCT VFR BLD AUTO: 39 % (ref 34–46.6)
HDLC SERPL-MCNC: 58 MG/DL
HGB BLD-MCNC: 12.9 G/DL (ref 11.1–15.9)
IMM GRANULOCYTES # BLD AUTO: 0 X10E3/UL (ref 0–0.1)
IMM GRANULOCYTES NFR BLD AUTO: 0 %
LDLC SERPL CALC-MCNC: 113 MG/DL (ref 0–99)
LYMPHOCYTES # BLD AUTO: 1.2 X10E3/UL (ref 0.7–3.1)
LYMPHOCYTES NFR BLD AUTO: 10 %
MCH RBC QN AUTO: 30.1 PG (ref 26.6–33)
MCHC RBC AUTO-ENTMCNC: 33.1 G/DL (ref 31.5–35.7)
MCV RBC AUTO: 91 FL (ref 79–97)
MONOCYTES # BLD AUTO: 1.1 X10E3/UL (ref 0.1–0.9)
MONOCYTES NFR BLD AUTO: 9 %
NEUTROPHILS # BLD AUTO: 8.9 X10E3/UL (ref 1.4–7)
NEUTROPHILS NFR BLD AUTO: 77 %
PLATELET # BLD AUTO: 282 X10E3/UL (ref 150–450)
POTASSIUM SERPL-SCNC: 3.9 MMOL/L (ref 3.5–5.2)
PROT SERPL-MCNC: 6.3 G/DL (ref 6–8.5)
RBC # BLD AUTO: 4.28 X10E6/UL (ref 3.77–5.28)
SODIUM SERPL-SCNC: 142 MMOL/L (ref 134–144)
T4 FREE SERPL-MCNC: 1.06 NG/DL (ref 0.82–1.77)
TRIGL SERPL-MCNC: 57 MG/DL (ref 0–149)
TSH SERPL DL<=0.005 MIU/L-ACNC: 2.3 UIU/ML (ref 0.45–4.5)
VIT B12 SERPL-MCNC: 1191 PG/ML (ref 232–1245)
VLDLC SERPL CALC-MCNC: 11 MG/DL (ref 5–40)
WBC # BLD AUTO: 11.7 X10E3/UL (ref 3.4–10.8)

## 2020-01-08 ENCOUNTER — PATIENT MESSAGE (OUTPATIENT)
Dept: INTERNAL MEDICINE CLINIC | Age: 60
End: 2020-01-08

## 2020-01-09 RX ORDER — FLUCONAZOLE 150 MG/1
150 TABLET ORAL DAILY
Qty: 1 TAB | Refills: 0 | Status: SHIPPED | OUTPATIENT
Start: 2020-01-09 | End: 2020-01-10

## 2020-01-09 NOTE — TELEPHONE ENCOUNTER
Kervin Begin 1/9/2020 7:48 AM EST      ----- Message -----  From: Sheldonluis Robbins  Sent: 1/8/2020 5:23 PM EST  To: [facility] Nurse Pool  Subject: Prescription Question     Can you please call in to [id number] the 1 time oral pill to get rid of a yeast infection. Antibiotics always give me one and over-the-counter meds do not work for me.     Thank you!!

## 2020-01-11 NOTE — PROGRESS NOTES
Thyroid function is normal continue current regimen  Normal blood count, kidney and liver function   Vitamin D is at goal and vitamin D is normal  Cholesterol is at goal

## 2020-03-10 DIAGNOSIS — E89.0 POSTOPERATIVE HYPOTHYROIDISM: ICD-10-CM

## 2020-03-11 RX ORDER — LEVOTHYROXINE AND LIOTHYRONINE 57; 13.5 UG/1; UG/1
90 TABLET ORAL DAILY
Qty: 90 TAB | Refills: 1 | Status: SHIPPED | OUTPATIENT
Start: 2020-03-11 | End: 2020-09-07

## 2020-06-30 ENCOUNTER — PATIENT MESSAGE (OUTPATIENT)
Dept: INTERNAL MEDICINE CLINIC | Age: 60
End: 2020-06-30

## 2020-06-30 ENCOUNTER — TELEPHONE (OUTPATIENT)
Dept: INTERNAL MEDICINE CLINIC | Age: 60
End: 2020-06-30

## 2020-06-30 DIAGNOSIS — F41.9 ANXIETY: ICD-10-CM

## 2020-06-30 NOTE — TELEPHONE ENCOUNTER
Patient returned call in reference to changing 6 mos F/U appt on 7/2/20 @ 8 am to a Virtual appt. Patient States \" She thinks it's Stupid to do a Virtual Appt & is not going to miss Time from Work for just a Virtual Visit\". Patient was advised can Only re-sched for a Virtual Visit at this time & patient got mad & hung up & said would call back later & That \"This is Ridiculous\". No Future appt scheduled for Virtual Visit another date or time.

## 2020-07-01 RX ORDER — ALPRAZOLAM 0.5 MG/1
0.5 TABLET ORAL
Qty: 60 TAB | Refills: 2 | OUTPATIENT
Start: 2020-07-01

## 2020-07-06 NOTE — TELEPHONE ENCOUNTER
#[phone]   Please leave a vm with date and time of appt. Pt states she must have a appt to get medication. There are no appts available in the time frame needed to get medication. Please call & leave a detailed vm with date/time of appt and she will work with this. Pt needs to be seen or VV as soon as possible she states.

## 2020-07-10 ENCOUNTER — VIRTUAL VISIT (OUTPATIENT)
Dept: INTERNAL MEDICINE CLINIC | Age: 60
End: 2020-07-10

## 2020-07-10 DIAGNOSIS — E03.9 ACQUIRED HYPOTHYROIDISM: ICD-10-CM

## 2020-07-10 DIAGNOSIS — F41.9 ANXIETY: ICD-10-CM

## 2020-07-10 DIAGNOSIS — F51.01 PRIMARY INSOMNIA: Primary | ICD-10-CM

## 2020-07-10 RX ORDER — ALPRAZOLAM 0.5 MG/1
0.5 TABLET ORAL
Qty: 30 TAB | Refills: 5 | Status: SHIPPED | OUTPATIENT
Start: 2020-07-10 | End: 2021-01-12 | Stop reason: SDUPTHER

## 2020-07-10 RX ORDER — MINERAL OIL
90 ENEMA (ML) RECTAL
COMMUNITY
End: 2021-07-21 | Stop reason: ALTCHOICE

## 2020-07-10 RX ORDER — ALPRAZOLAM 0.5 MG/1
0.5 TABLET ORAL
Qty: 60 TAB | Refills: 2 | Status: CANCELLED | OUTPATIENT
Start: 2020-07-10

## 2020-07-10 NOTE — PROGRESS NOTES
CC: Medication Refill      HPI:    She is a 63 y.o. female who presents for evaluation of       Obesity: had lost 30 lbs and gained 20 lbs back  She is frustrated  She has lost weight diet and exercise      Interstitial cystitis     S/p right hemithyroidectomy for nodules. Has been on armour thyroid 90mg for many years and TSH has been stable  Due for recheck    Recall Patient has a hx of anxiety and has not done well with SSRIs and currently takes xanax 0.5mg one a day at night. Tried prozac and lexapro and had severe reactions  anxiety was not debilitating until menopause. She is taking xanax 0.5mg at bedtime and when tried to decrease dose to 0.25 mg  and she has insomnia and feels unwell and had diarrhea, stayed up all night and tried to wean off for over one week and did not succeed. Recall: Patient has difficulty with tolerating medications. Has had anaphylaxis to codeine therefore carries benadryl and epi pen       [facility]: [facility]     Up to date on colonoscopy            This is an established visit conducted via telemedicine with video. The patient has been instructed that this meets HIPAA criteria and acknowledges and agrees to this method of visitation. Pursuant to the emergency declaration under the [facility], 1135 waiver authority and the [employer] Act, this Virtual Visit was conducted, with patient's consent, to reduce the patient's risk of exposure to COVID-19 and provide continuity of care for an established patient. Services were provided through a video synchronous discussion virtually to substitute for in-person clinic visit. ROS:  Constitutional: negative for fevers, chills, anorexia and weight loss  Eyes:   negative for visual disturbance,  irritation  ENT:   negative for tinnitus,sore throat,nasal congestion,ear pain, sinus pain. Respiratory:  negative for cough, hemoptysis, dyspnea,wheezing  CV:   negative for chest pain, palpitations, lower extremity edema  GI:   negative for nausea, vomiting, diarrhea, abdominal pain,melena  Genitourinary: negative for frequency, dysuria, hematuria  Musculoskel: negative for myalgias, arthralgias, back pain, muscle weakness, joint pain  Neurological:  negative for headaches, dizziness, focal weakness, numbness  Psych:             Negative for depression and anxiety    Past Medical History:   Diagnosis Date    Anxiety     Asthma 2008    [place] [address] [address] [address]     basal cell 20 years ago upper abdomen    Foot fracture     Hyperlipidemia     Hypothyroidism     Interstitial cystitis     [facility] Luisa [facility]brook at [facility] Interstitial cystitis     Menopausal disorder     Other ill-defined conditions(849.63)     kidney stones in [place]    PONV (postoperative nausea and vomiting)     severe nausea 1x in the past    Psychiatric disorder 11/10    anxiety attack    Thyroid disease        Current Outpatient Medications on File Prior to Visit   Medication Sig Dispense Refill    fexofenadine (ALLEGRA) 180 mg tablet Take 180 mg by mouth.  thyroid, [place], ([place] Thyroid) 90 mg tablet Take 1 Tab by mouth daily. 90 Tab 1    cyanocobalamin (VITAMIN B-12) 1,000 mcg tablet Take 5,000 mcg by mouth daily.  cholecalciferol, VITAMIN D3, (VITAMIN D3) 5,000 unit tab tablet Take  by mouth daily.  estradiol-norethindrone (COMBIPATCH) 0.05-0.25 mg/24 hr 1 Patch by TransDERmal route two (2) times a week. No current facility-administered medications on file prior to visit.         Past Surgical History:   Procedure Laterality Date    BREAST SURGERY PROCEDURE UNLISTED      left breast lumpectomy    HX ADENOIDECTOMY  1977    HX DILATION AND CURETTAGE      miscarriage x1    HX GI  2010    cholecystectomy    HX GYN      laparoscopy for fertility    HX OTHER SURGICAL  2006    hemithyroidectomy right, gall bladder removal    HX TONSILLECTOMY         Family History   Problem Relation Age of Onset    Cancer Mother     Heart Disease Father     Heart Disease Brother      Reviewed and no changes     Social History     Socioeconomic History    Marital status:      Spouse name: Not on file    Number of children: Not on file    Years of education: Not on file    Highest education level: Not on file   Occupational History    Not on file   Social Needs    Financial resource strain: Not on file    Food insecurity     Worry: Not on file     Inability: Not on file    Transportation needs     Medical: Not on file     Non-medical: Not on file   Tobacco Use    Smoking status: Former Smoker     Packs/day: 1.00     Years: 12.00     Pack years: 12.00     Types: Cigarettes     Last attempt to quit: 1990     Years since quittin.4    Smokeless tobacco: Never Used   Substance and Sexual Activity    Alcohol use: No     Alcohol/week: 0.0 standard drinks     Comment: rarely    Drug use: No    Sexual activity: Yes   Lifestyle    Physical activity     Days per week: Not on file     Minutes per session: Not on file    Stress: Not on file   Relationships    Social connections     Talks on phone: Not on file     Gets together: Not on file     Attends Latter-day service: Not on file     Active member of club or organization: Not on file     Attends meetings of clubs or organizations: Not on file     Relationship status: Not on file    Intimate partner violence     Fear of current or ex partner: Not on file     Emotionally abused: Not on file     Physically abused: Not on file     Forced sexual activity: Not on file   Other Topics Concern    Not on file   Social History Narrative    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 CV: patient does not feel palpitations or heart irregularity  Abd: patient does not feel abdominal tenderness or mass, patient does not notice distension  Extrem: patient does not see swelling in ankles or joints. Neuro: Alert and oriented, able to answer questions without difficulty, able to move all extremities and walk normally          Lab Results   Component Value Date/Time    WBC 11.7 (H) 12/31/2019 08:55 AM    HGB 12.9 12/31/2019 08:55 AM    HCT 39.0 12/31/2019 08:55 AM    PLATELET 055 55/50/4658 08:55 AM    MCV 91 12/31/2019 08:55 AM     Lab Results   Component Value Date/Time    Sodium 142 12/31/2019 08:55 AM    Potassium 3.9 12/31/2019 08:55 AM    Chloride 107 (H) 12/31/2019 08:55 AM    CO2 22 12/31/2019 08:55 AM    Anion gap 6 12/10/2018 07:29 PM    Glucose 86 12/31/2019 08:55 AM    BUN 16 12/31/2019 08:55 AM    Creatinine 0.55 (L) 12/31/2019 08:55 AM    BUN/Creatinine ratio 29 (H) 12/31/2019 08:55 AM    GFR est  12/31/2019 08:55 AM    GFR est non- 12/31/2019 08:55 AM    Calcium 9.0 12/31/2019 08:55 AM     Lab Results   Component Value Date/Time    Cholesterol, total 182 12/31/2019 08:55 AM    HDL Cholesterol 58 12/31/2019 08:55 AM    LDL, calculated 113 (H) 12/31/2019 08:55 AM    VLDL, calculated 11 12/31/2019 08:55 AM    Triglyceride 57 12/31/2019 08:55 AM     Lab Results   Component Value Date/Time    TSH 2.300 12/31/2019 08:55 AM     No results found for: PSA, PSA2, PSAR1, Dicie West, [id number], [id number], PSALT  Lab Results   Component Value Date/Time    Hemoglobin A1c 5.1 06/10/2019 02:47 PM     Lab Results   Component Value Date/Time    VITAMIN D, 25-HYDROXY 46.0 12/31/2019 08:55 AM       Lab Results   Component Value Date/Time    ALT (SGPT) 29 12/31/2019 08:55 AM    Alk.  phosphatase 63 12/31/2019 08:55 AM    Bilirubin, total 0.4 12/31/2019 08:55 AM           Assessment/Plan:    1. Anxiety  2. Primary insomnia  Takes one xanax at bedtime ( 0.5mg)   Since menopause - has struggled with anxiety and did not tolerate SSRI in the past.   Takes on  - ALPRAZolam (XANAX) 0.5 mg tablet; Take 1 Tab by mouth nightly. Max Daily Amount: 0.5 mg.  Dispense: 30 Tab; Refill: 5    3. Acquired hypothyroidism  - has been on armour thyroid 90 mg daily   - TSH AND FREE T4      [facility] at [address] [place] [address],  [address]      Mina Avery MD    This is an established visit conducted via real time video and audio telemedicine. The patient has been instructed that this meets HIPAA criteria and acknowledges and agrees to this method of visitation.

## 2020-07-25 LAB
T4 FREE SERPL-MCNC: 0.99 NG/DL (ref 0.82–1.77)
TSH SERPL DL<=0.005 MIU/L-ACNC: 2.92 UIU/ML (ref 0.45–4.5)

## 2021-01-12 ENCOUNTER — VIRTUAL VISIT (OUTPATIENT)
Dept: INTERNAL MEDICINE CLINIC | Age: 61
End: 2021-01-12
Payer: COMMERCIAL

## 2021-01-12 DIAGNOSIS — F41.9 ANXIETY: ICD-10-CM

## 2021-01-12 DIAGNOSIS — R63.5 WEIGHT GAIN: ICD-10-CM

## 2021-01-12 DIAGNOSIS — E03.9 ACQUIRED HYPOTHYROIDISM: ICD-10-CM

## 2021-01-12 DIAGNOSIS — E04.1 THYROID NODULE: ICD-10-CM

## 2021-01-12 DIAGNOSIS — F51.01 PRIMARY INSOMNIA: ICD-10-CM

## 2021-01-12 DIAGNOSIS — R76.8 POSITIVE ANA (ANTINUCLEAR ANTIBODY): Primary | ICD-10-CM

## 2021-01-12 DIAGNOSIS — M25.50 PAIN IN JOINT, MULTIPLE SITES: ICD-10-CM

## 2021-01-12 PROCEDURE — 99214 OFFICE O/P EST MOD 30 MIN: CPT | Performed by: INTERNAL MEDICINE

## 2021-01-12 RX ORDER — ALPRAZOLAM 0.5 MG/1
0.5 TABLET ORAL
Qty: 30 TAB | Refills: 5 | Status: SHIPPED | OUTPATIENT
Start: 2021-01-12 | End: 2021-02-08 | Stop reason: SINTOL

## 2021-01-12 NOTE — PROGRESS NOTES
Reviewed record in preparation for visit and have obtained necessary documentation. Identified pt with two pt identifiers(name and ). Chief Complaint   Patient presents with   [address] [employer] Maintenance Due   Topic Date Due    Shingles Vaccine (1 of 2) 2011    Mammogram  2019    Pap Test  2020    Yearly Flu Vaccine (1) 2020    DTaP/Tdap/Td  (2 - Td) 2020       Ms. Odilia Mcgee has a reminder for a \"due or due soon\" health maintenance. I have asked that she discuss this further with her primary care provider for follow-up on this health maintenance. Coordination of Care Questionnaire:  :     1) Have you been to an emergency room, urgent care clinic since your last visit? no   Hospitalized since your last visit? no             2) Have you seen or consulted any other health care providers outside of [address] [place] [address] since your last visit? no  (Include any pap smears or colon screenings in this section.)    3) In the event something were to happen to you and you were unable to speak on your behalf, do you have an Advance Directive/ Living Will in place stating your wishes? NO    Do you have an Advance Directive on file? no    4) Are you interested in receiving information on Advance Directives? NO    Patient is accompanied by self I have received verbal consent from Cuco Burnham to discuss any/all medical information while they are present in the room.

## 2021-01-12 NOTE — PROGRESS NOTES
CC: Anxiety      HPI:    She is a 62 y.o. female who presents for evaluation of anxiety      Obesity: had lost 30 lbs and gained back and feels  \" crappy\"   She is frustrated  She has lost weight diet and exercise      Kidney stone in July 2020 nad had surgery Lucíajulio césar Burger had stent from kidney to bladder from 10 days and recovered    Has Interstitial cystitis     S/p right hemithyroidectomy for nodules. Has been on armour thyroid 90mg for many years and TSH has been stable  Due for recheck    Recall Patient has a hx of anxiety and has not done well with SSRIs and currently takes xanax 0.5mg one a day at night. Tried prozac and lexapro and had severe reactions  anxiety was not debilitating until menopause. She is taking xanax 0.5mg at bedtime and sleeping well    Patient reports aches and pain in knees   Notes swelling in hands and feet \" skin feels stretched\"   Notes ongoing occasional swelling in hands and feet     She tested positive SALVADOR and saw Dr Smitha Caldera and told did not have rheumatological condition several years ago but now with more symptoms patient is concerned with possible RA or another disease    But now having more shoulder pain, headaches, neck pain and swelling and is concerned with possible autoimmune diisease  Notes stiffness in several joints prolonged in the morning  Stiffness improves with movement    Recall: Patient has difficulty with tolerating medications. Has had anaphylaxis to codeine therefore carries benadryl and epi pen       Mammogram: [facility] July 2020     Up to date on colonoscopy             This is an established visit conducted via telemedicine with video. The patient has been instructed that this meets HIPAA criteria and acknowledges and agrees to this method of visitation.      Pursuant to the emergency declaration under the [facility], 1135 waiver authority and the [employer]ropri[employer] Act, this Virtual Visit was conducted, with patient's consent, to reduce the patient's risk of exposure to COVID-19 and provide continuity of care for an established patient. Services were provided through a video synchronous discussion virtually to substitute for in-person clinic visit. ROS:  Constitutional: negative for fevers, chills, anorexia and weight loss  10 systems reviewed and negative other than HPI     Past Medical History:   Diagnosis Date    Anxiety     Asthma 2008    [place] [address] [address]     basal cell 20 years ago upper abdomen    Foot fracture     Hyperlipidemia     Hypothyroidism     Interstitial cystitis     Dr. Washington Ch[facility] at [facility] Interstitial cystitis     Menopausal disorder     Other ill-defined conditions(669.35)     kidney stones in [place]    PONV (postoperative nausea and vomiting)     severe nausea 1x in the past    Psychiatric disorder 11/10    anxiety attack    Thyroid disease        Current Outpatient Medications on File Prior to Visit   Medication Sig Dispense Refill    [place] Thyroid 90 mg tablet TAKE 1 TABLET BY MOUTH  DAILY 90 Tab 0    fexofenadine (ALLEGRA) 180 mg tablet Take 180 mg by mouth.  ALPRAZolam (XANAX) 0.5 mg tablet Take 1 Tab by mouth nightly. Max Daily Amount: 0.5 mg. 30 Tab 5    cyanocobalamin (VITAMIN B-12) 1,000 mcg tablet Take 5,000 mcg by mouth daily.  estradiol-norethindrone (COMBIPATCH) 0.05-0.25 mg/24 hr 1 Patch by TransDERmal route two (2) times a week. No current facility-administered medications on file prior to visit.         Past Surgical History:   Procedure Laterality Date    BREAST SURGERY PROCEDURE UNLISTED      left breast lumpectomy    HX ADENOIDECTOMY  1977    HX DILATION AND CURETTAGE      miscarriage x1    HX GI  2010    cholecystectomy    HX GYN      laparoscopy for fertility    HX OTHER SURGICAL  2006    hemithyroidectomy right, gall bladder removal    HX TONSILLECTOMY  1977       Family History   Problem Relation Age of Onset    Cancer Mother     Heart Disease Father     Heart Disease Brother      Reviewed and no changes     Social History     Socioeconomic History    Marital status:      Spouse name: Not on file    Number of children: Not on file    Years of education: Not on file    Highest education level: Not on file   Occupational History    Not on file   Social Needs    Financial resource strain: Not on file    Food insecurity     Worry: Not on file     Inability: Not on file    Transportation needs     Medical: Not on file     Non-medical: Not on file   Tobacco Use    Smoking status: Former Smoker     Packs/day: 1.00     Years: 12.00     Pack years: 12.00     Types: Cigarettes     Quit date: 1990     Years since quittin.9    Smokeless tobacco: Never Used   Substance and Sexual Activity    Alcohol use: No     Alcohol/week: 0.0 standard drinks     Comment: rarely    Drug use: No    Sexual activity: Yes   Lifestyle    Physical activity     Days per week: Not on file     Minutes per session: Not on file    Stress: Not on file   Relationships    Social connections     Talks on phone: Not on file     Gets together: Not on file     Attends Sabianist service: Not on file     Active member of club or organization: Not on file     Attends meetings of clubs or organizations: Not on file     Relationship status: Not on file    Intimate partner violence     Fear of current or ex partner: Not on file     Emotionally abused: Not on file     Physically abused: Not on file     Forced sexual activity: Not on file   Other Topics Concern    Not on file   Social History Narrative    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   Living with             [facility]       Physical Examination:   Gen: well appearing female  HEENT: normal conjunctiva, no audible congestion, patient does not see oral erythema, has MMM  Neck: patient does not feel enlarged or tender LAD or masses  Resp: normal respiratory effort, no audible wheezing. CV: patient does not feel palpitations or heart irregularity  Abd: patient does not feel abdominal tenderness or mass, patient does not notice distension  Extrem: patient reports swelling in hands and feet ( mild) reports pain in hands and knees and back  Neuro: Alert and oriented, able to answer questions without difficulty, able to move all extremities and walk normally          Lab Results   Component Value Date/Time    WBC 11.7 (H) 12/31/2019 08:55 AM    HGB 12.9 12/31/2019 08:55 AM    HCT 39.0 12/31/2019 08:55 AM    PLATELET 052 49/76/2351 08:55 AM    MCV 91 12/31/2019 08:55 AM     Lab Results   Component Value Date/Time    Sodium 142 12/31/2019 08:55 AM    Potassium 3.9 12/31/2019 08:55 AM    Chloride 107 (H) 12/31/2019 08:55 AM    CO2 22 12/31/2019 08:55 AM    Anion gap 6 12/10/2018 07:29 PM    Glucose 86 12/31/2019 08:55 AM    BUN 16 12/31/2019 08:55 AM    Creatinine 0.55 (L) 12/31/2019 08:55 AM    BUN/Creatinine ratio 29 (H) 12/31/2019 08:55 AM    GFR est  12/31/2019 08:55 AM    GFR est non- 12/31/2019 08:55 AM    Calcium 9.0 12/31/2019 08:55 AM     Lab Results   Component Value Date/Time    Cholesterol, total 182 12/31/2019 08:55 AM    HDL Cholesterol 58 12/31/2019 08:55 AM    LDL, calculated 113 (H) 12/31/2019 08:55 AM    VLDL, calculated 11 12/31/2019 08:55 AM    Triglyceride 57 12/31/2019 08:55 AM     Lab Results   Component Value Date/Time    TSH 2.920 07/24/2020 08:39 AM     No results found for: PSA, Clay Naas, [id number], [id number]  Lab Results   Component Value Date/Time    Hemoglobin A1c 5.1 06/10/2019 02:47 PM     Lab Results   Component Value Date/Time    VITAMIN D, 25-HYDROXY 46.0 12/31/2019 08:55 AM       Lab Results   Component Value Date/Time    ALT (SGPT) 29 12/31/2019 08:55 AM    Alk.  phosphatase 63 12/31/2019 08:55 AM    Bilirubin, total 0.4 12/31/2019 08:55 AM           Assessment/Plan:    1. Anxiety  2. Primary insomnia  Takes one xanax at bedtime ( 0.5mg)   Since menopause - has struggled with anxiety and did not tolerate SSRI in the past.   Takes on  - ALPRAZolam (XANAX) 0.5 mg tablet; Take 1 Tab by mouth nightly. Max Daily Amount: 0.5 mg.  Dispense: 30 Tab; Refill: 5    3. Acquired hypothyroidism  Hx of thyroidectomy ( partial) due to nodules  - check US of thyroid   - has been on armour thyroid 90 mg daily   - TSH AND FREE T4    4. Several aches in joints and swelling in hands and feet and stiffness in hands  - will check AN comprehensive and Rheumassure and sed rate    Follow up in 4 weeks to review labs and do physical exam  [facility] care at [address] [place] [address],  [address]      Chauncey Zuniga MD    This is an established visit conducted via real time video and audio telemedicine. The patient has been instructed that this meets HIPAA criteria and acknowledges and agrees to this method of visitation.

## 2021-01-14 ENCOUNTER — APPOINTMENT (OUTPATIENT)
Dept: GENERAL RADIOLOGY | Age: 61
End: 2021-01-14
Attending: EMERGENCY MEDICINE
Payer: COMMERCIAL

## 2021-01-14 ENCOUNTER — TELEPHONE (OUTPATIENT)
Dept: INTERNAL MEDICINE CLINIC | Age: 61
End: 2021-01-14

## 2021-01-14 ENCOUNTER — HOSPITAL ENCOUNTER (EMERGENCY)
Age: 61
Discharge: HOME | End: 2021-01-14
Attending: EMERGENCY MEDICINE
Payer: COMMERCIAL

## 2021-01-14 VITALS
OXYGEN SATURATION: 100 % | DIASTOLIC BLOOD PRESSURE: 60 MMHG | TEMPERATURE: 98.7 F | WEIGHT: 168.87 LBS | BODY MASS INDEX: 33.15 KG/M2 | SYSTOLIC BLOOD PRESSURE: 126 MMHG | HEIGHT: 60 IN | RESPIRATION RATE: 21 BRPM | HEART RATE: 80 BPM

## 2021-01-14 DIAGNOSIS — R07.9 CHEST PAIN, UNSPECIFIED TYPE: Primary | ICD-10-CM

## 2021-01-14 LAB
ALBUMIN SERPL-MCNC: 3.9 G/DL (ref 3.5–5)
ALBUMIN/GLOB SERPL: 1.1 {RATIO} (ref 1.1–2.2)
ALP SERPL-CCNC: 58 U/L (ref 45–117)
ALT SERPL-CCNC: 25 U/L (ref 12–78)
ANION GAP SERPL CALC-SCNC: 3 MMOL/L (ref 5–15)
AST SERPL-CCNC: 12 U/L (ref 15–37)
ATRIAL RATE: 87 BPM
BASOPHILS # BLD: 0.1 K/UL (ref 0–0.1)
BASOPHILS NFR BLD: 1 % (ref 0–1)
BILIRUB SERPL-MCNC: 0.5 MG/DL (ref 0.2–1)
BNP SERPL-MCNC: 75 PG/ML
BUN SERPL-MCNC: 10 MG/DL (ref 6–20)
BUN/CREAT SERPL: 14 (ref 12–20)
CALCIUM SERPL-MCNC: 8.7 MG/DL (ref 8.5–10.1)
CALCULATED P AXIS, ECG09: 55 DEGREES
CALCULATED R AXIS, ECG10: 54 DEGREES
CALCULATED T AXIS, ECG11: 43 DEGREES
CHLORIDE SERPL-SCNC: 107 MMOL/L (ref 97–108)
CK SERPL-CCNC: 87 U/L (ref 26–192)
CO2 SERPL-SCNC: 27 MMOL/L (ref 21–32)
CREAT SERPL-MCNC: 0.7 MG/DL (ref 0.55–1.02)
D DIMER PPP FEU-MCNC: 0.44 MG/L FEU (ref 0–0.65)
DIAGNOSIS, 93000: NORMAL
DIFFERENTIAL METHOD BLD: ABNORMAL
EOSINOPHIL # BLD: 0.3 K/UL (ref 0–0.4)
EOSINOPHIL NFR BLD: 3 % (ref 0–7)
ERYTHROCYTE [DISTWIDTH] IN BLOOD BY AUTOMATED COUNT: 13.1 % (ref 11.5–14.5)
GLOBULIN SER CALC-MCNC: 3.4 G/DL (ref 2–4)
GLUCOSE SERPL-MCNC: 91 MG/DL (ref 65–100)
HCT VFR BLD AUTO: 43.8 % (ref 35–47)
HGB BLD-MCNC: 14.3 G/DL (ref 11.5–16)
IMM GRANULOCYTES # BLD AUTO: 0 K/UL (ref 0–0.04)
IMM GRANULOCYTES NFR BLD AUTO: 0 % (ref 0–0.5)
LYMPHOCYTES # BLD: 1.3 K/UL (ref 0.8–3.5)
LYMPHOCYTES NFR BLD: 12 % (ref 12–49)
MCH RBC QN AUTO: 28.7 PG (ref 26–34)
MCHC RBC AUTO-ENTMCNC: 32.6 G/DL (ref 30–36.5)
MCV RBC AUTO: 88 FL (ref 80–99)
MONOCYTES # BLD: 1.1 K/UL (ref 0–1)
MONOCYTES NFR BLD: 10 % (ref 5–13)
NEUTS SEG # BLD: 8.3 K/UL (ref 1.8–8)
NEUTS SEG NFR BLD: 74 % (ref 32–75)
NRBC # BLD: 0 K/UL (ref 0–0.01)
NRBC BLD-RTO: 0 PER 100 WBC
P-R INTERVAL, ECG05: 138 MS
PLATELET # BLD AUTO: 330 K/UL (ref 150–400)
PMV BLD AUTO: 9.8 FL (ref 8.9–12.9)
POTASSIUM SERPL-SCNC: 3.5 MMOL/L (ref 3.5–5.1)
PROT SERPL-MCNC: 7.3 G/DL (ref 6.4–8.2)
Q-T INTERVAL, ECG07: 374 MS
QRS DURATION, ECG06: 76 MS
QTC CALCULATION (BEZET), ECG08: 450 MS
RBC # BLD AUTO: 4.98 M/UL (ref 3.8–5.2)
SODIUM SERPL-SCNC: 137 MMOL/L (ref 136–145)
TROPONIN I SERPL-MCNC: <0.05 NG/ML
TROPONIN I SERPL-MCNC: <0.05 NG/ML
VENTRICULAR RATE, ECG03: 87 BPM
WBC # BLD AUTO: 11.1 K/UL (ref 3.6–11)

## 2021-01-14 PROCEDURE — 84484 ASSAY OF TROPONIN QUANT: CPT

## 2021-01-14 PROCEDURE — 74011250637 HC RX REV CODE- 250/637: Performed by: EMERGENCY MEDICINE

## 2021-01-14 PROCEDURE — 74011250636 HC RX REV CODE- 250/636: Performed by: EMERGENCY MEDICINE

## 2021-01-14 PROCEDURE — 36415 COLL VENOUS BLD VENIPUNCTURE: CPT

## 2021-01-14 PROCEDURE — 80053 COMPREHEN METABOLIC PANEL: CPT

## 2021-01-14 PROCEDURE — 82550 ASSAY OF CK (CPK): CPT

## 2021-01-14 PROCEDURE — 93005 ELECTROCARDIOGRAM TRACING: CPT

## 2021-01-14 PROCEDURE — 85379 FIBRIN DEGRADATION QUANT: CPT

## 2021-01-14 PROCEDURE — 83880 ASSAY OF NATRIURETIC PEPTIDE: CPT

## 2021-01-14 PROCEDURE — 85025 COMPLETE CBC W/AUTO DIFF WBC: CPT

## 2021-01-14 PROCEDURE — 71045 X-RAY EXAM CHEST 1 VIEW: CPT

## 2021-01-14 PROCEDURE — 99285 EMERGENCY DEPT VISIT HI MDM: CPT

## 2021-01-14 RX ORDER — DIAZEPAM 5 MG/1
5 TABLET ORAL
Status: COMPLETED | OUTPATIENT
Start: 2021-01-14 | End: 2021-01-14

## 2021-01-14 RX ADMIN — DIAZEPAM 5 MG: 5 TABLET ORAL at 08:48

## 2021-01-14 RX ADMIN — SODIUM CHLORIDE 1000 ML: 9 INJECTION, SOLUTION INTRAVENOUS at 08:46

## 2021-01-14 NOTE — TELEPHONE ENCOUNTER
----- Message from Padma Anne MD sent at 1/12/2021 11:15 AM EST -----  Needs records from urologist Dr Mati Dupont

## 2021-01-14 NOTE — ED NOTES
Pt arrives to ED with c/o sob, chest pressure, and a headache that started at 2pm yesterday after going up and down the stairs. Pt states it lasted all night. She reports she thought it was anxiety attack and took a xanax at 6:30pm yesterday. Pt reports pain is not as bad presently. Pt is A&Ox4 and on cardiac monitor x3.

## 2021-01-14 NOTE — ED PROVIDER NOTES
EMERGENCY DEPARTMENT HISTORY AND PHYSICAL EXAM      Date: 1/14/2021  Patient Name: Candace Vick    History of Presenting Illness     Chief Complaint   Patient presents with    Chest Pain     for 15 hours, she has had chest pounding, nausea, sob, and headache along with htn    Headache    Nausea         HPI: Candace Vick, 62 y.o. female with a history of anxiety presenting to ED with chief complaint of chest pain, shortness of breath, headache that started last night. She was going upstairs and states she felt much shorter of breath than usual.  Since then she is felt like her heart was pounding, she has a tightness in her chest, she is short of breath, and she has a headache. She states she had similar episode about 2 years ago and was diagnosed with anxiety. She tried a Xanax for it this morning but that did not resolve her symptoms. There are no other complaints, changes, or physical findings at this time. PCP: Rae Avalos MD    No current facility-administered medications on file prior to encounter. Current Outpatient Medications on File Prior to Encounter   Medication Sig Dispense Refill    ALPRAZolam (XANAX) 0.5 mg tablet Take 1 Tab by mouth nightly. Max Daily Amount: 0.5 mg. 30 Tab 5    [place] Thyroid 90 mg tablet TAKE 1 TABLET BY MOUTH  DAILY 90 Tab 0    fexofenadine (ALLEGRA) 180 mg tablet Take 180 mg by mouth.  cyanocobalamin (VITAMIN B-12) 1,000 mcg tablet Take 5,000 mcg by mouth daily.  estradiol-norethindrone (COMBIPATCH) 0.05-0.25 mg/24 hr 1 Patch by TransDERmal route two (2) times a week.          Past History     Past Medical History:  Past Medical History:   Diagnosis Date    Anxiety     Asthma 2008    allergy induced [place] [address] [facility] ([facility] [address]     basal cell 20 years ago upper abdomen    Foot fracture     Hyperlipidemia     Hypothyroidism     Interstitial cystitis     [facility] Power Graves at [facility] Interstitial cystitis     Kidney stones     Menopausal disorder     Other ill-defined conditions(799.89)     kidney stones in [place]    PONV (postoperative nausea and vomiting)     severe nausea 1x in the past    Psychiatric disorder 11/10    anxiety attack    Thyroid disease        Past Surgical History:  Past Surgical History:   Procedure Laterality Date    HX ADENOIDECTOMY      HX DILATION AND CURETTAGE      miscarriage x1    HX GI  2010    cholecystectomy    HX GYN      laparoscopy for fertility    HX OTHER SURGICAL  2006    hemithyroidectomy right, gall bladder removal    HX TONSILLECTOMY  1977    [place] BREAST SURGERY PROCEDURE UNLISTED      left breast lumpectomy       Family History:  Family History   Problem Relation Age of Onset    Cancer Mother     Heart Disease Father     Heart Disease Brother        Social History:  Social History     Tobacco Use    Smoking status: Former Smoker     Packs/day: 1.00     Years: 12.00     Pack years: 12.00     Types: Cigarettes     Quit date: 1990     Years since quittin.9    Smokeless tobacco: Never Used   Substance Use Topics    Alcohol use: No     Alcohol/week: 0.0 standard drinks     Comment: rarely    Drug use: No       Allergies: Allergies   Allergen Reactions    Adhesive Other (comments)     Eat skin off    Codeine Anaphylaxis    Codeine Sulfate Anaphylaxis    Darvon [Propoxyphene] Itching    Percocet [Oxycodone-Acetaminophen] Itching    Sulfa (Sulfonamide Antibiotics) Hives    Vicodin [Hydrocodone-Acetaminophen] Itching    Avocado Swelling    Demerol [Meperidine] Itching    Dilaudid [Hydromorphone] Itching    Lexapro [Escitalopram] Other (comments)     shake    Sulfamethoxazole-Trimethoprim Rash         Review of Systems   Review of Systems   Constitutional: Negative for chills and fever. HENT: Negative for rhinorrhea. Eyes: Negative for redness. Respiratory: Positive for chest tightness and shortness of breath. Cardiovascular: Positive for palpitations. Gastrointestinal: Negative for abdominal pain. Genitourinary: Negative for dysuria. Musculoskeletal: Negative for back pain. Neurological: Positive for headaches. Negative for syncope. Psychiatric/Behavioral: The patient is not nervous/anxious. All other systems reviewed and are negative. Physical Exam   Physical Exam  Vitals signs and nursing note reviewed. Constitutional:       Appearance: Normal appearance. HENT:      Head: Normocephalic and atraumatic. Mouth/Throat:      Mouth: Mucous membranes are moist.   Eyes:      Extraocular Movements: Extraocular movements intact. Neck:      Musculoskeletal: Neck supple. Cardiovascular:      Rate and Rhythm: Normal rate and regular rhythm. Pulses: Normal pulses. Pulmonary:      Effort: Pulmonary effort is normal.      Breath sounds: Normal breath sounds. Abdominal:      Palpations: Abdomen is soft. Tenderness: There is no abdominal tenderness. Musculoskeletal:         General: No deformity. Skin:     General: Skin is warm and dry. Neurological:      General: No focal deficit present. Mental Status: She is alert. Psychiatric:         Mood and Affect: Mood normal.         Behavior: Behavior normal.         Diagnostic Study Results     Labs -   No results found for this or any previous visit (from the past 24 hour(s)). Radiologic Studies -   XR CHEST PORT    (Results Pending)     CT Results  (Last 48 hours)    None        CXR Results  (Last 48 hours)    None            Medical Decision Making   I am the first provider for this patient. I reviewed the vital signs, available nursing notes, past medical history, past surgical history, family history and social history. Vital Signs-Reviewed the patient's vital signs.   Patient Vitals for the past 24 hrs:   Temp Pulse Resp BP SpO2   01/14/21 0746 98.7 °F (37.1 °C) 94 16 (!) 132/113 100 %         Provider Notes (Medical Decision Making):   Very pleasant, well-appearing 55-year-old lady presenting to ED with primary chief complaint of chest tightness, shortness of breath, palpitations, headaches. States that similar to previous anxiety but worse. Looks well in the ED though anxious. Normal vital signs. EKG with no ischemia, troponins are negative x2, does not sound like ACS. D-dimer is negative, PE is unlikely. Chest x-ray with no pneumonia or pneumothorax. Unclear if patient is having intermittent arrhythmia and recommended follow-up with cardiologist for further evaluation. May need adjustment of her anxiety medications. Ultimately, I think she is safe for discharge. We will plan for follow-up as outpatient. Return precautions given. EKG sinus, rate 87, nonspecific ST T changes, normal QT    ED Course:     Initial assessment performed. The patients presenting problems have been discussed, and they are in agreement with the care plan formulated and outlined with them. I have encouraged them to ask questions as they arise throughout their visit. Disposition:  dc    PLAN:  1. Current Discharge Medication List        2.    Follow-up Information    None       Return to ED if worse     Diagnosis     Clinical Impression: chest tightness

## 2021-01-14 NOTE — ED NOTES
Patient discharged by Dr. Niurka Leon. Patient provided with discharge instructions Rx and instructions on follow up care. Patient out of ED.

## 2021-01-15 ENCOUNTER — TELEPHONE (OUTPATIENT)
Dept: INTERNAL MEDICINE CLINIC | Age: 61
End: 2021-01-15

## 2021-01-15 NOTE — TELEPHONE ENCOUNTER
MD Yadi Lopez LPN   Caller: Unspecified (Today, 11:29 AM)             Lab order placed chinyere last visit   Can defer to in person march visit     [employer] message sent to patient

## 2021-01-15 NOTE — TELEPHONE ENCOUNTER
Magdalena Unger [facility]   Phone Number:  [phone] (Call me)             Appointment not available     Caller's first and last name and relationship to patient (if not the patient): N/A       Best contact number: [phone]       Preferred date and time: As soon as possible       Scheduled appointment date and time: N/A       Reason for appointment: F/up       Details to clarify the request: Pt would like to schedule with Dr. Mark Davalos only.        Zoë Hassan     Copy/Paste  ENVERA

## 2021-01-15 NOTE — TELEPHONE ENCOUNTER
Scheduled patient for a VV on 1/29 for high BP, patient states her BP was high because she was having an anxiety attack and by the time she left the hospital her BP was normal. She wants a call to follow up on lab orders and she wants to know since her BP is back to normal if she needs the VV on 1/29 or if she can schedule an in person visit in March (next available)?

## 2021-01-16 ENCOUNTER — PATIENT MESSAGE (OUTPATIENT)
Dept: INTERNAL MEDICINE CLINIC | Age: 61
End: 2021-01-16

## 2021-01-16 DIAGNOSIS — R76.8 POSITIVE ANA (ANTINUCLEAR ANTIBODY): ICD-10-CM

## 2021-01-16 DIAGNOSIS — M34.9 SCLERODERMA (HCC): ICD-10-CM

## 2021-01-16 DIAGNOSIS — F13.20 BENZODIAZEPINE DEPENDENCE (HCC): Primary | ICD-10-CM

## 2021-01-18 RX ORDER — BUSPIRONE HYDROCHLORIDE 10 MG/1
10 TABLET ORAL 3 TIMES DAILY
Qty: 90 TAB | Refills: 1 | Status: SHIPPED | OUTPATIENT
Start: 2021-01-18 | End: 2021-01-29 | Stop reason: DRUGHIGH

## 2021-01-18 NOTE — TELEPHONE ENCOUNTER
From: Veronika Diaz  To: Raza Awad MD  Sent: 1/16/2021 7:07 PM EST  Subject: Prescription Question    Dr Anila Wagner: I have a virtual appt scheduled to talk about my anxiety meds per the ER doctor. He believes my body has become tolerant of the dosage of Xanax I'm on since I've been on it for a long time. Which is why I'm having a lot of anxiety lately including the one that sent me to the ER. Since going to the ER I have felt horrible. Headaches, nauseous, quivering and anxious. I was hoping to add a different med and not increase my xanax but I was feeling so bad today I took one today. Usually I take 1 at night but today I also took 1 during the day. It made me feel much better. I can't wait for my virtual appt. I need to know if it is okay to increase the xanax to twice a day or should I be trying something less addictive?  Ag Rivera

## 2021-01-26 RX ORDER — PANTOPRAZOLE SODIUM 40 MG/1
40 TABLET, DELAYED RELEASE ORAL 2 TIMES DAILY
Qty: 60 TAB | Refills: 0 | Status: SHIPPED | OUTPATIENT
Start: 2021-01-26 | End: 2021-02-22

## 2021-01-29 ENCOUNTER — VIRTUAL VISIT (OUTPATIENT)
Dept: INTERNAL MEDICINE CLINIC | Age: 61
End: 2021-01-29
Payer: COMMERCIAL

## 2021-01-29 DIAGNOSIS — F13.20 BENZODIAZEPINE DEPENDENCE (HCC): ICD-10-CM

## 2021-01-29 DIAGNOSIS — F41.9 ANXIETY: Primary | ICD-10-CM

## 2021-01-29 PROCEDURE — 99214 OFFICE O/P EST MOD 30 MIN: CPT | Performed by: INTERNAL MEDICINE

## 2021-01-29 RX ORDER — BUSPIRONE HYDROCHLORIDE 7.5 MG/1
7.5 TABLET ORAL 3 TIMES DAILY
Qty: 90 TAB | Refills: 1 | Status: SHIPPED | OUTPATIENT
Start: 2021-01-29 | End: 2021-02-14 | Stop reason: DRUGHIGH

## 2021-01-29 NOTE — PROGRESS NOTES
CC: Hypertension      HPI:    She is a 62 y.o. female who presents for evaluation of anxiety      Recall Patient has a hx of anxiety and has not done well with SSRIs and and previously she was on  xanax 0.5mg one a day at night. Tried prozac and lexapro and had severe reactions  ~ 3 weeks ago started to have increased anxiety. Started having panic attacks during the day. Had two panic attacks during the day. Happened at work and felt as if she was going to have a heart attack. Heart was racing. Previously panic attacks happened at night ( takes xanax at night). At that point took xanax within 30 minutes everything resolved. Then 3 days later had another panic attack during the day and continued to have panic attacks. Patient communicated with me and I started her on buspar. Buspar caused nausea and temporarily helped with anxiety but the developed nausea and diarrhea, \" quivery insides, chills. Then stopped taking medication. For the past week continues to have nausea, diarrhea and chills  Had vomiting Jan 25th   By Jan 26th could barely tolerate PO intake  By Jan 27th symptoms slightly improved took xanax 0.5mg BID and symptoms resolved. Discussed with patient that likely I suspect the symptoms are related withdrawal from xanax  Patient wants to stop xanax but feels that her body is dependent on it                 This is an established visit conducted via telemedicine with video. The patient has been instructed that this meets HIPAA criteria and acknowledges and agrees to this method of visitation. Pursuant to the emergency declaration under the [facility] waiver authority and the [employer] Act, this Virtual Visit was conducted, with patient's consent, to reduce the patient's risk of exposure to COVID-19 and provide continuity of care for an established patient.       Services were provided through a video synchronous discussion virtually to substitute for in-person clinic visit. ROS:  Constitutional: negative for fevers, chills, anorexia and weight loss  10 systems reviewed and negative other than HPI     Past Medical History:   Diagnosis Date    Anxiety     Asthma 2008    allergy induced in [address] ([address]     basal cell 20 years ago upper abdomen    Foot fracture     Hyperlipidemia     Hypothyroidism     Interstitial cystitis     Dr. José Luis Werner at [facility] Interstitial cystitis     Kidney stones     Menopausal disorder     Other ill-defined conditions(899.69)     kidney stones in [place]    PONV (postoperative nausea and vomiting)     severe nausea 1x in the past    Psychiatric disorder 11/10    anxiety attack    Thyroid disease        Current Outpatient Medications on File Prior to Visit   Medication Sig Dispense Refill    pantoprazole (PROTONIX) 40 mg tablet Take 1 Tab by mouth two (2) times a day. Indications: indigestion 60 Tab 0    ALPRAZolam (XANAX) 0.5 mg tablet Take 1 Tab by mouth nightly. Max Daily Amount: 0.5 mg. 30 Tab 5    [place] Thyroid 90 mg tablet TAKE 1 TABLET BY MOUTH  DAILY 90 Tab 0    fexofenadine (ALLEGRA) 180 mg tablet Take 180 mg by mouth.  cyanocobalamin (VITAMIN B-12) 1,000 mcg tablet Take 5,000 mcg by mouth daily.  estradiol-norethindrone (COMBIPATCH) 0.05-0.25 mg/24 hr 1 Patch by TransDERmal route two (2) times a week. No current facility-administered medications on file prior to visit.         Past Surgical History:   Procedure Laterality Date    HX ADENOIDECTOMY  1977    HX DILATION AND CURETTAGE      miscarriage x1    HX GI  2010    cholecystectomy    HX GYN      laparoscopy for fertility    HX OTHER SURGICAL  2006    hemithyroidectomy right, gall bladder removal    HX TONSILLECTOMY  1977    [place] BREAST SURGERY PROCEDURE UNLISTED      left breast lumpectomy       Family History   Problem Relation Age of Onset    Cancer Mother     Heart Disease Father     Heart Disease Brother      Reviewed and no changes     Social History     Socioeconomic History    Marital status:      Spouse name: Not on file    Number of children: Not on file    Years of education: Not on file    Highest education level: Not on file   Occupational History    Not on file   Social Needs    Financial resource strain: Not on file    Food insecurity     Worry: Not on file     Inability: Not on file    Transportation needs     Medical: Not on file     Non-medical: Not on file   Tobacco Use    Smoking status: Former Smoker     Packs/day: 1.00     Years: 12.00     Pack years: 12.00     Types: Cigarettes     Quit date: 1990     Years since quittin.9    Smokeless tobacco: Never Used   Substance and Sexual Activity    Alcohol use: No     Alcohol/week: 0.0 standard drinks     Comment: rarely    Drug use: No    Sexual activity: Yes   Lifestyle    Physical activity     Days per week: Not on file     Minutes per session: Not on file    Stress: Not on file   Relationships    Social connections     Talks on phone: Not on file     Gets together: Not on file     Attends Zoroastrian service: Not on file     Active member of club or organization: Not on file     Attends meetings of clubs or organizations: Not on file     Relationship status: Not on file    Intimate partner violence     Fear of current or ex partner: Not on file     Emotionally abused: Not on file     Physically abused: Not on file     Forced sexual activity: Not on file   Other Topics Concern    Not on file   Social History Narrative    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    CV: patient does not feel palpitations or heart irregularity  Abd: patient does not feel abdominal tenderness or mass, patient does not notice distension  Extrem: patient does not see swelling in ankles or joints. Neuro: Alert and oriented, able to answer questions without difficulty, able to move all extremities and walk normally          Lab Results   Component Value Date/Time    WBC 11.1 (H) 01/14/2021 07:58 AM    HGB 14.3 01/14/2021 07:58 AM    HCT 43.8 01/14/2021 07:58 AM    PLATELET 786 01/12/2547 07:58 AM    MCV 88.0 01/14/2021 07:58 AM     Lab Results   Component Value Date/Time    Sodium 137 01/14/2021 07:58 AM    Potassium 3.5 01/14/2021 07:58 AM    Chloride 107 01/14/2021 07:58 AM    CO2 27 01/14/2021 07:58 AM    Anion gap 3 (L) 01/14/2021 07:58 AM    Glucose 91 01/14/2021 07:58 AM    BUN 10 01/14/2021 07:58 AM    Creatinine 0.70 01/14/2021 07:58 AM    BUN/Creatinine ratio 14 01/14/2021 07:58 AM    GFR est AA >60 01/14/2021 07:58 AM    GFR est non-AA >60 01/14/2021 07:58 AM    Calcium 8.7 01/14/2021 07:58 AM     Lab Results   Component Value Date/Time    Cholesterol, total 182 12/31/2019 08:55 AM    HDL Cholesterol 58 12/31/2019 08:55 AM    LDL, calculated 113 (H) 12/31/2019 08:55 AM    VLDL, calculated 11 12/31/2019 08:55 AM    Triglyceride 57 12/31/2019 08:55 AM     Lab Results   Component Value Date/Time    TSH 2.920 07/24/2020 08:39 AM     No results found for: PSA, Carey Maindialin, [id number], [id number]  Lab Results   Component Value Date/Time    Hemoglobin A1c 5.1 06/10/2019 02:47 PM     Lab Results   Component Value Date/Time    VITAMIN D, 25-HYDROXY 46.0 12/31/2019 08:55 AM       Lab Results   Component Value Date/Time    ALT (SGPT) 25 01/14/2021 07:58 AM    Alk. phosphatase 58 01/14/2021 07:58 AM    Bilirubin, total 0.5 01/14/2021 07:58 AM           Assessment/Plan:    1. Anxiety  2. Benzodiazepine dependence (HCC)  - busPIRone (BUSPAR) 7.5 mg tablet;  Take 1 Tab by mouth three (3) times daily. Dispense: 90 Tab; Refill: 1  - patient has been struggling with xanax dependence and withdrawal when decreasing dose. The plan is to come off of xanax and patient in agreement. Plan to resume buspar while on xanax BID for one week and see if tolerated and then start librium and wean off xanax and eventually stop librium. Adleaida Bruno MD    This is an established visit conducted via real time video and audio telemedicine. The patient has been instructed that this meets HIPAA criteria and acknowledges and agrees to this method of visitation.

## 2021-02-08 DIAGNOSIS — F13.20 BENZODIAZEPINE DEPENDENCE (HCC): ICD-10-CM

## 2021-02-08 RX ORDER — CHLORDIAZEPOXIDE HYDROCHLORIDE 5 MG/1
5 CAPSULE, GELATIN COATED ORAL DAILY
Qty: 30 CAP | Refills: 0 | Status: SHIPPED | OUTPATIENT
Start: 2021-02-08 | End: 2021-02-12 | Stop reason: SDUPTHER

## 2021-02-08 RX ORDER — CHLORDIAZEPOXIDE HYDROCHLORIDE 5 MG/1
5 CAPSULE, GELATIN COATED ORAL DAILY
Qty: 30 CAP | Refills: 0 | Status: SHIPPED | OUTPATIENT
Start: 2021-02-08 | End: 2021-02-08 | Stop reason: SDUPTHER

## 2021-02-08 NOTE — TELEPHONE ENCOUNTER
----- Message from Rhiannon Samuel sent at 2/8/2021  2:27 PM EST -----  Regarding: Dr Angeles Shaffer refill  Medication Refill    Caller (if not patient):Pt      Relationship of caller (if not patient): Pt      Best contact number(s): [phone]      Name of medication and dosage if known: \"Chlordiazeproxide' 5mg      Is patient out of this medication (yes/no): New      Pharmacy name: [facility] & [address] [place] [address] Ave listed in chart? (yes/no):[facility]  Pharmacy phone number: [phone]      Details to clarify the request: Pt is requesting a Rx for  \"Chlordiazeproxide' 5mg to be called to the pharmacy listed above ([facility] & [address] [place], [place] [address] Ave).  Pt advised [facility] on [address] (on file) does not have the med and they advised they cannot transfer to [facility] on [address] because it is a controlled substance      Message from [facility]

## 2021-02-12 RX ORDER — CHLORDIAZEPOXIDE HYDROCHLORIDE 5 MG/1
5 CAPSULE, GELATIN COATED ORAL
Qty: 60 CAP | Refills: 0 | Status: SHIPPED | OUTPATIENT
Start: 2021-02-12 | End: 2021-02-18 | Stop reason: SDUPTHER

## 2021-02-14 DIAGNOSIS — E89.0 POSTOPERATIVE HYPOTHYROIDISM: ICD-10-CM

## 2021-02-14 RX ORDER — BUSPIRONE HYDROCHLORIDE 10 MG/1
10 TABLET ORAL 3 TIMES DAILY
Qty: 90 TAB | Refills: 1 | Status: SHIPPED | OUTPATIENT
Start: 2021-02-14 | End: 2021-03-22

## 2021-02-15 RX ORDER — THYROID, PORCINE 90 MG/1
TABLET ORAL
Qty: 90 TAB | Refills: 3 | Status: SHIPPED | OUTPATIENT
Start: 2021-02-15 | End: 2022-03-31

## 2021-02-18 RX ORDER — CHLORDIAZEPOXIDE HYDROCHLORIDE 5 MG/1
5 CAPSULE, GELATIN COATED ORAL
Qty: 90 CAP | Refills: 0 | Status: SHIPPED | OUTPATIENT
Start: 2021-02-18 | End: 2021-03-19 | Stop reason: ALTCHOICE

## 2021-02-22 RX ORDER — PANTOPRAZOLE SODIUM 40 MG/1
40 TABLET, DELAYED RELEASE ORAL 2 TIMES DAILY
Qty: 60 TAB | Refills: 0 | Status: SHIPPED | OUTPATIENT
Start: 2021-02-22 | End: 2021-07-21 | Stop reason: ALTCHOICE

## 2021-03-08 DIAGNOSIS — R76.8 POSITIVE ANA (ANTINUCLEAR ANTIBODY): Primary | ICD-10-CM

## 2021-03-08 DIAGNOSIS — R76.8 CENTROMERE ANTIBODY POSITIVE: ICD-10-CM

## 2021-03-08 LAB
14.3.3 ETA, RHEUM. ARTHRITIS: <0.2 NG/ML
ALBUMIN SERPL-MCNC: 4 G/DL (ref 3.8–4.9)
ALBUMIN/GLOB SERPL: 1.8 {RATIO} (ref 1.2–2.2)
ALP SERPL-CCNC: 66 IU/L (ref 39–117)
ALT SERPL-CCNC: 14 IU/L (ref 0–32)
ANA SER QL: POSITIVE
AST SERPL-CCNC: 11 IU/L (ref 0–40)
BASOPHILS # BLD AUTO: 0.1 X10E3/UL (ref 0–0.2)
BASOPHILS NFR BLD AUTO: 1 %
BILIRUB SERPL-MCNC: <0.2 MG/DL (ref 0–1.2)
BUN SERPL-MCNC: 13 MG/DL (ref 6–24)
BUN/CREAT SERPL: 24 (ref 9–23)
CALCIUM SERPL-MCNC: 8.9 MG/DL (ref 8.7–10.2)
CCP IGA+IGG SERPL IA-ACNC: <20 UNITS
CENTROMERE B AB SER-ACNC: >8 AI (ref 0–0.9)
CHLORIDE SERPL-SCNC: 105 MMOL/L (ref 96–106)
CHROMATIN AB SERPL-ACNC: 0.3 AI (ref 0–0.9)
CO2 SERPL-SCNC: 22 MMOL/L (ref 20–29)
CREAT SERPL-MCNC: 0.55 MG/DL (ref 0.57–1)
DSDNA AB SER-ACNC: <1 IU/ML (ref 0–9)
ENA JO1 AB SER-ACNC: <0.2 AI (ref 0–0.9)
ENA RNP AB SER-ACNC: 0.5 AI (ref 0–0.9)
ENA SCL70 AB SER-ACNC: <0.2 AI (ref 0–0.9)
ENA SM AB SER-ACNC: <0.2 AI (ref 0–0.9)
ENA SS-A AB SER-ACNC: 0.2 AI (ref 0–0.9)
ENA SS-B AB SER-ACNC: <0.2 AI (ref 0–0.9)
EOSINOPHIL # BLD AUTO: 0.3 X10E3/UL (ref 0–0.4)
EOSINOPHIL NFR BLD AUTO: 3 %
ERYTHROCYTE [DISTWIDTH] IN BLOOD BY AUTOMATED COUNT: 12.9 % (ref 11.7–15.4)
ERYTHROCYTE [SEDIMENTATION RATE] IN BLOOD BY WESTERGREN METHOD: 10 MM/HR (ref 0–40)
GLOBULIN SER CALC-MCNC: 2.2 G/DL (ref 1.5–4.5)
GLUCOSE SERPL-MCNC: 89 MG/DL (ref 65–99)
HCT VFR BLD AUTO: 40.1 % (ref 34–46.6)
HGB BLD-MCNC: 13.2 G/DL (ref 11.1–15.9)
IMM GRANULOCYTES # BLD AUTO: 0 X10E3/UL (ref 0–0.1)
IMM GRANULOCYTES NFR BLD AUTO: 0 %
LYMPHOCYTES # BLD AUTO: 1.6 X10E3/UL (ref 0.7–3.1)
LYMPHOCYTES NFR BLD AUTO: 18 %
MCH RBC QN AUTO: 29.5 PG (ref 26.6–33)
MCHC RBC AUTO-ENTMCNC: 32.9 G/DL (ref 31.5–35.7)
MCV RBC AUTO: 90 FL (ref 79–97)
MONOCYTES # BLD AUTO: 1 X10E3/UL (ref 0.1–0.9)
MONOCYTES NFR BLD AUTO: 11 %
NEUTROPHILS # BLD AUTO: 6.1 X10E3/UL (ref 1.4–7)
NEUTROPHILS NFR BLD AUTO: 67 %
PLATELET # BLD AUTO: 291 X10E3/UL (ref 150–450)
POTASSIUM SERPL-SCNC: 3.9 MMOL/L (ref 3.5–5.2)
PROT SERPL-MCNC: 6.2 G/DL (ref 6–8.5)
RBC # BLD AUTO: 4.48 X10E6/UL (ref 3.77–5.28)
RHEUMATOID FACT SERPL-ACNC: <14 UNITS/ML
SEE BELOW, 164869: ABNORMAL
SODIUM SERPL-SCNC: 140 MMOL/L (ref 134–144)
T4 FREE SERPL-MCNC: 0.93 NG/DL (ref 0.82–1.77)
TSH SERPL DL<=0.005 MIU/L-ACNC: 1.8 UIU/ML (ref 0.45–4.5)
WBC # BLD AUTO: 9.1 X10E3/UL (ref 3.4–10.8)

## 2021-03-08 NOTE — PROGRESS NOTES
Brandy please call patient and given info on referral Dr Donahue/ result notes   Normal kidney and liver function   Normal blood count  One of the antibodies centromere Ab is positive which is suggestive of possible connective tissue disease ( possibly scleroderma) . I recommend evaluation with rheumatologist. Referral to Dr Roque Koroma done for further evaluation.

## 2021-03-11 ENCOUNTER — TELEPHONE (OUTPATIENT)
Dept: INTERNAL MEDICINE CLINIC | Age: 61
End: 2021-03-11

## 2021-03-11 NOTE — TELEPHONE ENCOUNTER
Patient states she's returning your call. Patient states that she is currently on break but can't talk at work unless break time. Patient states a detailed message can be left on her voice mail. Please call.  Thank you

## 2021-03-12 ENCOUNTER — TELEPHONE (OUTPATIENT)
Dept: INTERNAL MEDICINE CLINIC | Age: 61
End: 2021-03-12

## 2021-03-12 NOTE — TELEPHONE ENCOUNTER
MD Travis Childs LPN   Caller: Unspecified (Today, 10:52 AM)             I will send Dr Cyndie Pitts a message requesting sooner apt      Spoke with patient. Two pt identifiers confirmed. Patient advised that Dr. Ping Macias has sent a message to Dr. Cyndie Pitts asking if he can please see the patient sooner. Patient advised that she will either hear from his office or ours regarding an appointment. Patient advised that if she has not heard anything in a week to give me a call so that I can followup. Pt verbalized understanding of information discussed w/ no further questions at this time.

## 2021-03-12 NOTE — TELEPHONE ENCOUNTER
----- Message from Santa Prince sent at 3/12/2021 10:32 AM EST -----  Regarding: Dr. Sushila Valero/Telephone  Contact: [phone]  General Message/Vendor Calls    Caller's first and last name: N/A      Reason for call: PT has decided to see Dr. Donahue. PT requesting assistance from Sally to get sooner appt. Dr. Donahue unable to see PT until July.      Callback required yes/no and why: yes/follow up      Best contact number(s): [phone]      Details to clarify the request: Please leave detailed voicemail, if no answer or send message through portal. PT is at work.      Santa Prince

## 2021-03-12 NOTE — PROGRESS NOTES
Left message for patient with results and referral information at patients request  Advised to contact the office with any additional questions

## 2021-03-18 ENCOUNTER — TELEPHONE (OUTPATIENT)
Dept: INTERNAL MEDICINE CLINIC | Age: 61
End: 2021-03-18

## 2021-03-18 NOTE — TELEPHONE ENCOUNTER
----- Message from Jatinder Woods sent at 3/17/2021  6:09 PM EDT -----  Regarding: Prescription Question  Contact: [phone]  Hi: I think I have an issue. Over the last week my tongue has been feeling very weird. It has been getting worse each day. Yesterday and even more today inside my mouth and this afternoon the weird feeling is going down my throat. I'm wondering if I'm allergic to the [place]. I know I've been on it awhile now but that is how my \"[employer]\" body does. I can take stuff and be fine and then all of a sudden not fine. I took a bendyral when I got home from work but does not seem to be helping so not sure what is going on. Any idea if this could be from the [address] on something totally unrelated. Rinsed with mouthwash and salt water tons but not working. Should I continue taking the [place]?

## 2021-03-18 NOTE — TELEPHONE ENCOUNTER
MD Johnathan Mondragon LPN   Caller: Unspecified (Today,  7:41 AM)             Hard to know if related - can patient come in at 8: 15 AM ( schedule 8: 15) but arrive at 8  00 so I can look at her tongue      Spoke with patient. Two pt identifiers confirmed. Patient advised that Dr. Ancelmo Bullock would like to see her in the morning at 8am.  Patient agreed and appointment scheduled.

## 2021-03-19 ENCOUNTER — TELEPHONE (OUTPATIENT)
Dept: INTERNAL MEDICINE CLINIC | Age: 61
End: 2021-03-19

## 2021-03-19 ENCOUNTER — OFFICE VISIT (OUTPATIENT)
Dept: INTERNAL MEDICINE CLINIC | Age: 61
End: 2021-03-19
Payer: COMMERCIAL

## 2021-03-19 VITALS
DIASTOLIC BLOOD PRESSURE: 82 MMHG | OXYGEN SATURATION: 98 % | HEART RATE: 94 BPM | BODY MASS INDEX: 33.38 KG/M2 | SYSTOLIC BLOOD PRESSURE: 127 MMHG | RESPIRATION RATE: 16 BRPM | WEIGHT: 170 LBS | HEIGHT: 60 IN | TEMPERATURE: 96.5 F

## 2021-03-19 DIAGNOSIS — K14.6 GLOSSOPYROSIS: Primary | ICD-10-CM

## 2021-03-19 DIAGNOSIS — F41.9 ANXIETY: ICD-10-CM

## 2021-03-19 PROCEDURE — 99214 OFFICE O/P EST MOD 30 MIN: CPT | Performed by: INTERNAL MEDICINE

## 2021-03-19 NOTE — TELEPHONE ENCOUNTER
----- Message from Suresh Colbert MD sent at 3/19/2021  8:23 AM EDT -----  Request records of pap smear and mammogram Dr Thomas edmondson

## 2021-03-19 NOTE — PROGRESS NOTES
Ms. Jose Joshi is a new patient who is here to establish care. CC:  Allergic Reaction (inside of mouth feels weird and a metalic taste, its been a week)     acute visit   HPI:    Taking  Buspar 3 times a day 7.5mg  TID  2 weeks started to have tongue burning sensation and wonders if related    Recently successfully completed taper for xanax and successfully came off. Buspar was working well for anxiety.      Overdue for mammogram and pap smear - at [facility] overdue     Patient does feel tightness when swallowing  Reports tightening of skin   Positive SALVADOR and centromere B Ab  Has pain in hands and notes increased redness across the MCPs and digits  Denies issues with tolerating cold weather ( no Raynauds )   Has appointment with Dr Becky Rutherford next week  Review of systems:  Constitutional: negative for fever, chills, weight loss, night sweats   10 systems reviewed and negative other than HPI     Past Medical History:   Diagnosis Date    Anxiety     Asthma 2008    allergy induced in [address] ([address]     basal cell 20 years ago upper abdomen    Foot fracture     Hyperlipidemia     Hypothyroidism     Interstitial cystitis     Dr. Qing Welch at [facility] Interstitial cystitis     Kidney stones     Menopausal disorder     Other ill-defined conditions(169.93)     kidney stones in [place]    PONV (postoperative nausea and vomiting)     severe nausea 1x in the past    Psychiatric disorder 11/10    anxiety attack    Thyroid disease         Past Surgical History:   Procedure Laterality Date    HX ADENOIDECTOMY  1977    HX DILATION AND CURETTAGE      miscarriage x1    HX GI  2010    cholecystectomy    HX GYN      laparoscopy for fertility    HX OTHER SURGICAL  2006    hemithyroidectomy right, gall bladder removal    HX TONSILLECTOMY  1977    [place] BREAST SURGERY PROCEDURE UNLISTED      left breast lumpectomy       Allergies   Allergen Reactions    Adhesive Other (comments)     Eat skin off    Codeine Anaphylaxis    Codeine Sulfate Anaphylaxis    Darvon [Propoxyphene] Itching    Percocet [Oxycodone-Acetaminophen] Itching    Sulfa (Sulfonamide Antibiotics) Hives    Vicodin [Hydrocodone-Acetaminophen] Itching    Avocado Swelling    Demerol [Meperidine] Itching    Dilaudid [Hydromorphone] Itching    Lexapro [Escitalopram] Other (comments)     shake    Sulfamethoxazole-Trimethoprim Rash       Current Outpatient Medications on File Prior to Visit   Medication Sig Dispense Refill    pantoprazole (PROTONIX) 40 mg tablet TAKE 1 TAB BY MOUTH TWO (2) TIMES A DAY. INDICATIONS: INDIGESTION 60 Tab 0    [place] Thyroid 90 mg tablet TAKE 1 TABLET BY MOUTH  DAILY 90 Tab 3    fexofenadine (ALLEGRA) 180 mg tablet Take 180 mg by mouth.  cyanocobalamin (VITAMIN B-12) 1,000 mcg tablet Take 5,000 mcg by mouth daily.  estradiol-norethindrone (COMBIPATCH) 0.05-0.25 mg/24 hr 1 Patch by TransDERmal route two (2) times a week.  busPIRone (BUSPAR) 10 mg tablet Take 1 Tab by mouth three (3) times daily. 90 Tab 1    [DISCONTINUED] [place] Thyroid 90 mg tablet TAKE 1 TABLET BY MOUTH  DAILY 90 Tab 0     No current facility-administered medications on file prior to visit. family history includes Cancer in her mother; Heart Disease in her brother and father.     Social History     Socioeconomic History    Marital status:      Spouse name: Not on file    Number of children: Not on file    Years of education: Not on file    Highest education level: Not on file   Occupational History    Not on file   Social Needs    Financial resource strain: Not on file    Food insecurity     Worry: Not on file     Inability: Not on file    Transportation needs     Medical: Not on file     Non-medical: Not on file   Tobacco Use    Smoking status: Former Smoker     Packs/day: 1.00     Years: 12.00     Pack years: 12.00     Types: Cigarettes     Quit date: 1990     Years since quittin.1    Smokeless tobacco: Never Used   Substance and Sexual Activity    Alcohol use: No     Alcohol/week: 0.0 standard drinks     Comment: rarely    Drug use: No    Sexual activity: Yes   Lifestyle    Physical activity     Days per week: Not on file     Minutes per session: Not on file    Stress: Not on file   Relationships    Social connections     Talks on phone: Not on file     Gets together: Not on file     Attends Restorationism service: Not on file     Active member of club or organization: Not on file     Attends meetings of clubs or organizations: Not on file     Relationship status: Not on file    Intimate partner violence     Fear of current or ex partner: Not on file     Emotionally abused: Not on file     Physically abused: Not on file     Forced sexual activity: Not on file   Other Topics Concern    Not on file   Social History 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 External ear and canals normal, TMs normal.  Hearing normal to voice. Nose without edema or discharge, normal septum. Lips, teeth, gums normal.  Oropharynx: no erythema, no exudates, no lesions, normal tongue. Neck:  Supple. Thyroid normal size, nontender, without nodules. Respiratory: no respiratory distress,  no wheezing, no rhonchi, no rales. Cardiovascular:  RRR, normal S1S2, no murmur. Gastrointestinal: normal bowel sounds, soft, nontender, without masses. No hepatosplenomegaly. Extremities +2 pulses, no edema, normal sensation   Skin:  No rash, no lesions, no ulcers. Neuro:  A and OX4, fluent speech, cranial nerves normal 2-12. Sensation normal to light touch.   DTR symmetrical  Psych:  Normal affect      Lab Results   Component Value Date/Time    WBC 9.1 02/26/2021 04:39 PM    HGB 13.2 02/26/2021 04:39 PM    HCT 40.1 02/26/2021 04:39 PM    PLATELET 057 37/68/3910 04:39 PM    MCV 90 02/26/2021 04:39 PM     Lab Results   Component Value Date/Time    Sodium 140 02/26/2021 04:39 PM    Potassium 3.9 02/26/2021 04:39 PM    Chloride 105 02/26/2021 04:39 PM    CO2 22 02/26/2021 04:39 PM    Anion gap 3 (L) 01/14/2021 07:58 AM    Glucose 89 02/26/2021 04:39 PM    BUN 13 02/26/2021 04:39 PM    Creatinine 0.55 (L) 02/26/2021 04:39 PM    BUN/Creatinine ratio 24 (H) 02/26/2021 04:39 PM    GFR est  02/26/2021 04:39 PM    GFR est non- 02/26/2021 04:39 PM    Calcium 8.9 02/26/2021 04:39 PM     Lab Results   Component Value Date/Time    Cholesterol, total 182 12/31/2019 08:55 AM    HDL Cholesterol 58 12/31/2019 08:55 AM    LDL, calculated 113 (H) 12/31/2019 08:55 AM    VLDL, calculated 11 12/31/2019 08:55 AM    Triglyceride 57 12/31/2019 08:55 AM     Lab Results   Component Value Date/Time    TSH 1.800 02/26/2021 04:39 PM     Lab Results   Component Value Date/Time    Hemoglobin A1c 5.1 06/10/2019 02:47 PM     Lab Results   Component Value Date/Time    VITAMIN D, 25-HYDROXY 46.0 12/31/2019 08:55 AM                   Assessment and Plan:     1. Glossopyrosis: given timeline suspect due to buspar    2. Anxiety: patient was doing well on buspar and successfully tapered off of xanax. Given #1 will taper off buspar this week. Take one buspar daily for one week then stop. Allergic to SSRIs. Will see how patient does. 3 Positive SALVADOR and centromere B Ab: has appointment with Dr Roselyn Villasenor next week. Has joint aches, hand pain, \" skin tightness mostly in hands\" and mild difficulty with swallowing    Follow-up and Dispositions    · Return in about 6 months (around 9/19/2021).           Chris Gonzalez MD

## 2021-03-22 ENCOUNTER — HOSPITAL ENCOUNTER (EMERGENCY)
Age: 61
Discharge: HOME | End: 2021-03-22
Attending: STUDENT IN AN ORGANIZED HEALTH CARE EDUCATION/TRAINING PROGRAM
Payer: COMMERCIAL

## 2021-03-22 ENCOUNTER — APPOINTMENT (OUTPATIENT)
Dept: GENERAL RADIOLOGY | Age: 61
End: 2021-03-22
Attending: STUDENT IN AN ORGANIZED HEALTH CARE EDUCATION/TRAINING PROGRAM
Payer: COMMERCIAL

## 2021-03-22 VITALS
TEMPERATURE: 98.4 F | OXYGEN SATURATION: 98 % | RESPIRATION RATE: 19 BRPM | HEART RATE: 67 BPM | DIASTOLIC BLOOD PRESSURE: 74 MMHG | BODY MASS INDEX: 32.38 KG/M2 | WEIGHT: 164.9 LBS | SYSTOLIC BLOOD PRESSURE: 132 MMHG | HEIGHT: 60 IN

## 2021-03-22 DIAGNOSIS — R06.02 SOB (SHORTNESS OF BREATH): Primary | ICD-10-CM

## 2021-03-22 LAB
ALBUMIN SERPL-MCNC: 3.7 G/DL (ref 3.5–5)
ALBUMIN/GLOB SERPL: 1.1 {RATIO} (ref 1.1–2.2)
ALP SERPL-CCNC: 68 U/L (ref 45–117)
ALT SERPL-CCNC: 112 U/L (ref 12–78)
ANION GAP SERPL CALC-SCNC: 7 MMOL/L (ref 5–15)
AST SERPL-CCNC: 73 U/L (ref 15–37)
ATRIAL RATE: 80 BPM
BASOPHILS # BLD: 0.1 K/UL (ref 0–0.1)
BASOPHILS NFR BLD: 1 % (ref 0–1)
BILIRUB SERPL-MCNC: 0.6 MG/DL (ref 0.2–1)
BNP SERPL-MCNC: 105 PG/ML
BUN SERPL-MCNC: 13 MG/DL (ref 6–20)
BUN/CREAT SERPL: 19 (ref 12–20)
CALCIUM SERPL-MCNC: 8.3 MG/DL (ref 8.5–10.1)
CALCULATED P AXIS, ECG09: 60 DEGREES
CALCULATED R AXIS, ECG10: 60 DEGREES
CALCULATED T AXIS, ECG11: 26 DEGREES
CHLORIDE SERPL-SCNC: 107 MMOL/L (ref 97–108)
CK SERPL-CCNC: 104 U/L (ref 26–192)
CO2 SERPL-SCNC: 24 MMOL/L (ref 21–32)
CREAT SERPL-MCNC: 0.69 MG/DL (ref 0.55–1.02)
DIAGNOSIS, 93000: NORMAL
DIFFERENTIAL METHOD BLD: ABNORMAL
EOSINOPHIL # BLD: 0.1 K/UL (ref 0–0.4)
EOSINOPHIL NFR BLD: 1 % (ref 0–7)
ERYTHROCYTE [DISTWIDTH] IN BLOOD BY AUTOMATED COUNT: 13.2 % (ref 11.5–14.5)
GLOBULIN SER CALC-MCNC: 3.5 G/DL (ref 2–4)
GLUCOSE SERPL-MCNC: 93 MG/DL (ref 65–100)
HCT VFR BLD AUTO: 43.2 % (ref 35–47)
HGB BLD-MCNC: 14.4 G/DL (ref 11.5–16)
IMM GRANULOCYTES # BLD AUTO: 0 K/UL (ref 0–0.04)
IMM GRANULOCYTES NFR BLD AUTO: 0 % (ref 0–0.5)
LYMPHOCYTES # BLD: 1.2 K/UL (ref 0.8–3.5)
LYMPHOCYTES NFR BLD: 10 % (ref 12–49)
MCH RBC QN AUTO: 28.9 PG (ref 26–34)
MCHC RBC AUTO-ENTMCNC: 33.3 G/DL (ref 30–36.5)
MCV RBC AUTO: 86.6 FL (ref 80–99)
MONOCYTES # BLD: 1.1 K/UL (ref 0–1)
MONOCYTES NFR BLD: 10 % (ref 5–13)
NEUTS SEG # BLD: 9.2 K/UL (ref 1.8–8)
NEUTS SEG NFR BLD: 78 % (ref 32–75)
NRBC # BLD: 0 K/UL (ref 0–0.01)
NRBC BLD-RTO: 0 PER 100 WBC
P-R INTERVAL, ECG05: 138 MS
PLATELET # BLD AUTO: 332 K/UL (ref 150–400)
PMV BLD AUTO: 9.8 FL (ref 8.9–12.9)
POTASSIUM SERPL-SCNC: 3.3 MMOL/L (ref 3.5–5.1)
PROT SERPL-MCNC: 7.2 G/DL (ref 6.4–8.2)
Q-T INTERVAL, ECG07: 406 MS
QRS DURATION, ECG06: 74 MS
QTC CALCULATION (BEZET), ECG08: 468 MS
RBC # BLD AUTO: 4.99 M/UL (ref 3.8–5.2)
SODIUM SERPL-SCNC: 138 MMOL/L (ref 136–145)
TROPONIN I SERPL-MCNC: <0.05 NG/ML
VENTRICULAR RATE, ECG03: 80 BPM
WBC # BLD AUTO: 11.7 K/UL (ref 3.6–11)

## 2021-03-22 PROCEDURE — 74011250637 HC RX REV CODE- 250/637: Performed by: STUDENT IN AN ORGANIZED HEALTH CARE EDUCATION/TRAINING PROGRAM

## 2021-03-22 PROCEDURE — 71046 X-RAY EXAM CHEST 2 VIEWS: CPT

## 2021-03-22 PROCEDURE — 99284 EMERGENCY DEPT VISIT MOD MDM: CPT

## 2021-03-22 PROCEDURE — 83880 ASSAY OF NATRIURETIC PEPTIDE: CPT

## 2021-03-22 PROCEDURE — 94760 N-INVAS EAR/PLS OXIMETRY 1: CPT

## 2021-03-22 PROCEDURE — 84484 ASSAY OF TROPONIN QUANT: CPT

## 2021-03-22 PROCEDURE — 93005 ELECTROCARDIOGRAM TRACING: CPT

## 2021-03-22 PROCEDURE — 80053 COMPREHEN METABOLIC PANEL: CPT

## 2021-03-22 PROCEDURE — 85025 COMPLETE CBC W/AUTO DIFF WBC: CPT

## 2021-03-22 PROCEDURE — 36415 COLL VENOUS BLD VENIPUNCTURE: CPT

## 2021-03-22 PROCEDURE — 82550 ASSAY OF CK (CPK): CPT

## 2021-03-22 RX ORDER — MONTELUKAST SODIUM 10 MG/1
10 TABLET ORAL DAILY
Qty: 30 TAB | Refills: 1 | Status: SHIPPED | OUTPATIENT
Start: 2021-03-22 | End: 2021-06-22

## 2021-03-22 RX ORDER — ALBUTEROL SULFATE 90 UG/1
1 AEROSOL, METERED RESPIRATORY (INHALATION)
Qty: 1 INHALER | Refills: 2 | Status: SHIPPED | OUTPATIENT
Start: 2021-03-22

## 2021-03-22 RX ORDER — POTASSIUM CHLORIDE 750 MG/1
20 TABLET, FILM COATED, EXTENDED RELEASE ORAL
Status: COMPLETED | OUTPATIENT
Start: 2021-03-22 | End: 2021-03-22

## 2021-03-22 RX ADMIN — POTASSIUM CHLORIDE 20 MEQ: 750 TABLET, EXTENDED RELEASE ORAL at 09:21

## 2021-03-22 NOTE — ED NOTES
Pt ambulatory to room, without respiratory distress. Pt on the monitor x 3, VSS. Pt denies any CP but notes an achey/soreness in her upper chest, shoulders and back during inspiration as well as a dry cough with exertion. Pt reports her SOB is exacerbated by activity and improves with rest. Pt reports using albuterol x 2 on Friday night without much improvement. Pt reports hx of allergy induced asthma, but notes this hasn't been an issue in many years. Additionally, pt reports that she was evaluated by her PCP in February due to overall malaise, SOB and extremity swelling. Pt reports that that blood work suggested an autoimmune disorder like scleroderma. Pt denying SOB while at rest in stretcher, speaking in complete sentences. Additionally, pt reports switching from Xanax to Buspar in January. Pt reports transition was difficult and she experienced some odd sensations in her tongue/oral mucosa. She alerted her PCP who suspected this was related to Xanax withdrawal or the new medication. River.Crystal UNGER at bedside     0813 PT NATI w/ xray    0821 PT returned to room from xray     0923: Pt resting comfortably, w/out distress. Pt medicated per orders. 0945: Pt discharged by Dr Kia Galloway. Pt provided with discharge instructions Rx and instructions on follow up care. Pt ambulatory out of ED.

## 2021-03-22 NOTE — ED PROVIDER NOTES
EMERGENCY DEPARTMENT HISTORY AND PHYSICAL EXAM      Date: 3/22/2021  Patient Name: Santosh Mcnamara    History of Presenting Illness     Chief Complaint   Patient presents with    Shortness of Breath     Intermittent since Friday. Pt reports hx of asthma, however has not had a flare up in 13 years. Pt reports sx worse with ambulation. Pt recently dx with auto-immune disease. Pt denies any fevers or other ill symptoms. Pt speaking in complete symptoms without difficulty       History Provided By: Patient    HPI: Santosh Mcnamara, 62 y.o. female with past medical history of seasonal allergies and allergy induced asthma, newly diagnosed scleroderma, presents to the ED with cc of intermittent shortness of breath that was most severe over the weekend. Patient reports that she has not had any other allergy symptoms such as congestion, sneezing, rhinorrhea which typically precedes her asthma symptoms. Reports that she has not had an asthma exacerbation in several years. However, more recently she has had increased dyspnea on exertion. States that she feels like she cannot take a full breath at times after exerting herself. However, this resolves when she rests. Denies any lower extremity edema. No chest pain. Reports that she has some associated mild dry cough when she feels short of breath. States that she does not currently have an inhaler. But that she did use her 's inhaler when she had her symptoms at home, and that this seemed to help somewhat. No calf swelling or tenderness. No history of DVT/PEs or risk factors for thromboembolism. Patient states that she was recently weaned off of her Xanax after 7 or 8 years on it. She was initially placed on BuSpar by her PCP after weaning off Xanax, however due to developing akathisia, she has also discontinued BuSpar.   States that she is having increased anxiety, but that her current shortness of breath symptoms do not feel similar to her previous anxiety episodes. Patient currently denies any symptoms. No chest pain or shortness of breath at this time. Patient reports that she has yet to follow-up with a rheumatologist as her scleroderma diagnosis is very new, and she is concerned about the possibility of scleroderma causing pulmonary hypertension or other sequelae that is leading to her current presentation. PCP: Zay Bellamy MD    No current facility-administered medications on file prior to encounter. Current Outpatient Medications on File Prior to Encounter   Medication Sig Dispense Refill    pantoprazole (PROTONIX) 40 mg tablet TAKE 1 TAB BY MOUTH TWO (2) TIMES A DAY. INDICATIONS: INDIGESTION (Patient taking differently: Take 40 mg by mouth as needed. Indications: indigestion) 60 Tab 0    [place] Thyroid 90 mg tablet TAKE 1 TABLET BY MOUTH  DAILY 90 Tab 3    [DISCONTINUED] busPIRone (BUSPAR) 10 mg tablet Take 1 Tab by mouth three (3) times daily. 90 Tab 1    [DISCONTINUED] [place] Thyroid 90 mg tablet TAKE 1 TABLET BY MOUTH  DAILY 90 Tab 0    fexofenadine (ALLEGRA) 180 mg tablet Take 180 mg by mouth.  cyanocobalamin (VITAMIN B-12) 1,000 mcg tablet Take 5,000 mcg by mouth daily.  estradiol-norethindrone (COMBIPATCH) 0.05-0.25 mg/24 hr 1 Patch by TransDERmal route two (2) times a week.          Past History     Past Medical History:  Past Medical History:   Diagnosis Date    Anxiety     Asthma 2008    allergy induced in [address] [address]     basal cell 20 years ago upper abdomen    Foot fracture     Gall stones     Hyperlipidemia     Hypothyroidism     Interstitial cystitis     [facility] Earle [facility] at [facility] Interstitial cystitis     Kidney stones     Menopausal disorder     Other ill-defined conditions(799.89)     kidney stones in [place]    PONV (postoperative nausea and vomiting)     severe nausea 1x in the past    Psychiatric disorder 11/10    anxiety attack    Thyroid disease        Past Surgical History:  Past Surgical History:   Procedure Laterality Date    HX ADENOIDECTOMY      HX CHOLECYSTECTOMY      HX DILATION AND CURETTAGE      miscarriage x1    HX GI  2010    cholecystectomy    HX GYN      laparoscopy for fertility    HX OTHER SURGICAL  2006    hemithyroidectomy right, gall bladder removal    HX TONSILLECTOMY      [place] BREAST SURGERY PROCEDURE UNLISTED      left breast lumpectomy       Family History:  Family History   Problem Relation Age of Onset    Cancer Mother     Heart Disease Father     Heart Disease Brother        Social History:  Social History     Tobacco Use    Smoking status: Former Smoker     Packs/day: 1.00     Years: 12.00     Pack years: 12.00     Types: Cigarettes     Quit date: 1990     Years since quittin.1    Smokeless tobacco: Never Used   Substance Use Topics    Alcohol use: No     Alcohol/week: 0.0 standard drinks     Comment: rarely    Drug use: Yes     Types: Prescription       Allergies: Allergies   Allergen Reactions    Adhesive Other (comments)     Eat skin off    Codeine Anaphylaxis    Codeine Sulfate Anaphylaxis    Darvon [Propoxyphene] Itching    Percocet [Oxycodone-Acetaminophen] Itching    Sulfa (Sulfonamide Antibiotics) Hives    Vicodin [Hydrocodone-Acetaminophen] Itching    Avocado Swelling    Demerol [Meperidine] Itching    Dilaudid [Hydromorphone] Itching    Lexapro [Escitalopram] Other (comments)     shake    Sulfamethoxazole-Trimethoprim Rash         Review of Systems   Review of Systems   Constitutional: Negative for chills and fever. HENT: Negative for congestion and rhinorrhea. Eyes: Negative for visual disturbance. Respiratory: Positive for shortness of breath. Negative for chest tightness and wheezing. Cardiovascular: Negative for chest pain, palpitations and leg swelling. Gastrointestinal: Negative for abdominal pain, diarrhea, nausea and vomiting. Genitourinary: Negative for dysuria, flank pain and hematuria. Musculoskeletal: Negative for back pain and neck pain. Skin: Negative for rash. Allergic/Immunologic: Negative for immunocompromised state. Neurological: Negative for dizziness, speech difficulty, weakness and headaches. Hematological: Negative for adenopathy. Psychiatric/Behavioral: Negative for dysphoric mood and suicidal ideas. Physical Exam   Physical Exam  Vitals signs and nursing note reviewed. Constitutional:       General: She is not in acute distress. Appearance: She is not ill-appearing or toxic-appearing. HENT:      Head: Normocephalic and atraumatic. Nose: Nose normal.      Mouth/Throat:      Mouth: Mucous membranes are moist.   Eyes:      Extraocular Movements: Extraocular movements intact. Pupils: Pupils are equal, round, and reactive to light. Neck:      Musculoskeletal: Normal range of motion and neck supple. Cardiovascular:      Rate and Rhythm: Normal rate and regular rhythm. Pulses: Normal pulses. Pulmonary:      Effort: Pulmonary effort is normal.      Breath sounds: No stridor. No decreased breath sounds, wheezing or rhonchi. Abdominal:      General: Abdomen is flat. There is no distension. Tenderness: There is no abdominal tenderness. Musculoskeletal: Normal range of motion. Right lower leg: No edema. Left lower leg: No edema. Skin:     General: Skin is warm and dry. Neurological:      General: No focal deficit present. Mental Status: She is alert and oriented to person, place, and time.    Psychiatric:         Judgment: Judgment normal.         Diagnostic Study Results     Labs -     Recent Results (from the past 24 hour(s))   EKG, 12 LEAD, INITIAL    Collection Time: 03/22/21  7:21 AM   Result Value Ref Range    Ventricular Rate 80 BPM    Atrial Rate 80 BPM    P-R Interval 138 ms    QRS Duration 74 ms    Q-T Interval 406 ms    QTC Calculation (Bezet) 468 ms    Calculated P Axis 60 degrees    Calculated R Axis 60 degrees Calculated T Axis 26 degrees    Diagnosis       Sinus rhythm with premature supraventricular complexes  When compared with ECG of 14-JAN-2021 07:52,  No significant change was found  Confirmed by Riaz Alcocer ([id number]) on 3/22/2021 10:28:50 AM     CBC WITH AUTOMATED DIFF    Collection Time: 03/22/21  7:34 AM   Result Value Ref Range    WBC 11.7 (H) 3.6 - 11.0 K/uL    RBC 4.99 3.80 - 5.20 M/uL    HGB 14.4 11.5 - 16.0 g/dL    HCT 43.2 35.0 - 47.0 %    MCV 86.6 80.0 - 99.0 FL    MCH 28.9 26.0 - 34.0 PG    MCHC 33.3 30.0 - 36.5 g/dL    RDW 13.2 11.5 - 14.5 %    PLATELET 174 967 - 123 K/uL    MPV 9.8 8.9 - 12.9 FL    NRBC 0.0 0  WBC    ABSOLUTE NRBC 0.00 0.00 - 0.01 K/uL    NEUTROPHILS 78 (H) 32 - 75 %    LYMPHOCYTES 10 (L) 12 - 49 %    MONOCYTES 10 5 - 13 %    EOSINOPHILS 1 0 - 7 %    BASOPHILS 1 0 - 1 %    IMMATURE GRANULOCYTES 0 0.0 - 0.5 %    ABS. NEUTROPHILS 9.2 (H) 1.8 - 8.0 K/UL    ABS. LYMPHOCYTES 1.2 0.8 - 3.5 K/UL    ABS. MONOCYTES 1.1 (H) 0.0 - 1.0 K/UL    ABS. EOSINOPHILS 0.1 0.0 - 0.4 K/UL    ABS. BASOPHILS 0.1 0.0 - 0.1 K/UL    ABS. IMM. GRANS. 0.0 0.00 - 0.04 K/UL    DF AUTOMATED     METABOLIC PANEL, COMPREHENSIVE    Collection Time: 03/22/21  7:34 AM   Result Value Ref Range    Sodium 138 136 - 145 mmol/L    Potassium 3.3 (L) 3.5 - 5.1 mmol/L    Chloride 107 97 - 108 mmol/L    CO2 24 21 - 32 mmol/L    Anion gap 7 5 - 15 mmol/L    Glucose 93 65 - 100 mg/dL    BUN 13 6 - 20 MG/DL    Creatinine 0.69 0.55 - 1.02 MG/DL    BUN/Creatinine ratio 19 12 - 20      GFR est AA >60 >60 ml/min/1.73m2    GFR est non-AA >60 >60 ml/min/1.73m2    Calcium 8.3 (L) 8.5 - 10.1 MG/DL    Bilirubin, total 0.6 0.2 - 1.0 MG/DL    ALT (SGPT) 112 (H) 12 - 78 U/L    AST (SGOT) 73 (H) 15 - 37 U/L    Alk.  phosphatase 68 45 - 117 U/L    Protein, total 7.2 6.4 - 8.2 g/dL    Albumin 3.7 3.5 - 5.0 g/dL    Globulin 3.5 2.0 - 4.0 g/dL    A-G Ratio 1.1 1.1 - 2.2     CK W/ REFLX CKMB    Collection Time: 03/22/21  7:34 AM   Result Value Ref Range     26 - 192 U/L   TROPONIN I    Collection Time: 03/22/21  7:34 AM   Result Value Ref Range    Troponin-I, Qt. <0.05 <0.05 ng/mL   NT-PRO BNP    Collection Time: 03/22/21  7:34 AM   Result Value Ref Range    NT pro- <125 PG/ML       Radiologic Studies -   XR CHEST PA LAT   Final Result   1. No acute cardiopulmonary disease           CT Results  (Last 48 hours)    None        CXR Results  (Last 48 hours)               03/22/21 0816  XR CHEST PA LAT Final result    Impression:  1. No acute cardiopulmonary disease           Narrative:  INDICATION:  Shortness of Breath        Exam: Chest 2 views. Comparison: 1/14/2021. Findings: Cardiomediastinal silhouette is normal. Pulmonary vasculature is not   engorged. No focal parenchymal opacities, effusions, or pneumothorax. Bony   thorax is intact. Medical Decision Making   I am the first provider for this patient. I reviewed the vital signs, available nursing notes, past medical history, past surgical history, family history and social history. Vital Signs-Reviewed the patient's vital signs. Patient Vitals for the past 24 hrs:   Temp Pulse Resp BP SpO2   03/22/21 0713 98.4 °F (36.9 °C) 83 18 (!) 181/100 100 %       EKG interpretation: (Preliminary)  Normal sinus rhythm, rate of 80 occasional PAC, no acute ST changes concerning for ischemia. QTC negligibly prolonged at 468. EKG interpretation by Juana Storm MD    Records Reviewed: Nursing Notes and Old Medical Records    Provider Notes (Medical Decision Making):   61-year-old female with past medical history of seasonal allergies, asthma, anxiety, and newly diagnosed scleroderma, presenting for evaluation of intermittent shortness of breath. Vitals are stable. On exam, patient is well-appearing, in no acute distress with no increased work of breathing. She is satting in the high 90s on room air. Breath sounds are clear and present bilaterally.   No lower extremity edema. No calf tenderness or swelling. Differential diagnosis considered: Asthma, CHF, pneumonia, ACS, scleroderma associated pulmonary disease, pulmonary hypertension, anxiety, PE (clinically low suspicion for this). Laboratory studies notable for mild elevation in  WBC, 11.7, nonspecific. She has mild hypokalemia of 3.3. Supplemented with 20 mEq of KCl in the ED. Her EKG is nonischemic. Troponin is negative. BNP is not elevated. Chest x-ray is negative for acute intrathoracic findings. Results discussed with the patient. Unclear etiology for her intermittent episodes of shortness of breath, possibly exercise-induced asthma? Exacerbated by anxiety surrounding her symptoms as patient was recently taken off of all medications for anxiety? Cannot rule out for rheumatology related pulmonary etiologies, however, does not require emergent work-up for this possibility. Patient has a scheduled rheumatology follow-up appointment on Wednesday of this week. She has had symptomatic relief using her 's albuterol inhaler. Advised to continue using albuterol inhaler if symptoms recur at home. Patient has refill for her albuterol inhaler from her PCP, as well as refills on her Singulair which she is also advised to take. She feels comfortable with plan for discharge and outpatient follow-up. All questions answered. Discharge at this time in stable condition. Kathy Ulloa MD      Disposition:  Discharge      DISCHARGE PLAN:  1. Discharge Medication List as of 3/22/2021  9:29 AM        2. Follow-up Information     Follow up With Specialties Details Why [facility]   [address]  [facility] [address] [place] [address]  [phone]          3. Return to ED if worse     Diagnosis     Clinical Impression:   1.  SOB (shortness of breath)        Attestations:    Kathy Ulloa MD    Please note that this dictation was completed with Dragon, the computer voice recognition software. Quite often unanticipated grammatical, syntax, homophones, and other interpretive errors are inadvertently transcribed by the computer software. Please disregard these errors. Please excuse any errors that have escaped final proofreading. Thank you.

## 2021-03-23 ENCOUNTER — TELEPHONE (OUTPATIENT)
Dept: RHEUMATOLOGY | Age: 61
End: 2021-03-23

## 2021-03-24 ENCOUNTER — OFFICE VISIT (OUTPATIENT)
Dept: RHEUMATOLOGY | Age: 61
End: 2021-03-24
Payer: COMMERCIAL

## 2021-03-24 VITALS
RESPIRATION RATE: 18 BRPM | DIASTOLIC BLOOD PRESSURE: 82 MMHG | HEIGHT: 60 IN | BODY MASS INDEX: 32.59 KG/M2 | HEART RATE: 76 BPM | TEMPERATURE: 96.8 F | SYSTOLIC BLOOD PRESSURE: 129 MMHG | WEIGHT: 166 LBS

## 2021-03-24 DIAGNOSIS — R06.09 DYSPNEA ON EXERTION: ICD-10-CM

## 2021-03-24 DIAGNOSIS — M34.9 LIMITED SYSTEMIC SCLEROSIS (HCC): Primary | ICD-10-CM

## 2021-03-24 DIAGNOSIS — M70.62 TROCHANTERIC BURSITIS OF BOTH HIPS: ICD-10-CM

## 2021-03-24 DIAGNOSIS — M25.512 CHRONIC PAIN OF BOTH SHOULDERS: ICD-10-CM

## 2021-03-24 DIAGNOSIS — M76.32 ILIOTIBIAL BAND FRICTION SYNDROME OF BOTH KNEES: ICD-10-CM

## 2021-03-24 DIAGNOSIS — M76.31 ILIOTIBIAL BAND FRICTION SYNDROME OF BOTH KNEES: ICD-10-CM

## 2021-03-24 DIAGNOSIS — M70.61 TROCHANTERIC BURSITIS OF BOTH HIPS: ICD-10-CM

## 2021-03-24 DIAGNOSIS — G89.29 CHRONIC PAIN OF BOTH SHOULDERS: ICD-10-CM

## 2021-03-24 DIAGNOSIS — M25.511 CHRONIC PAIN OF BOTH SHOULDERS: ICD-10-CM

## 2021-03-24 PROCEDURE — 99205 OFFICE O/P NEW HI 60 MIN: CPT | Performed by: INTERNAL MEDICINE

## 2021-03-24 NOTE — PROGRESS NOTES
REASON FOR VISIT    This is the initial evaluation for Ms. Agueda Meek a 62 y.o. WHITE female for question of a connective tissue disease. The patient is referred to the [facility] at the request of Dr. Stormy Traylor. HISTORY OF PRESENT ILLNESS     I have reviewed and summarized old records from [address] [place] [address] and [employer]    She has a history of panic attack disorder, interstitial cystitis, renal stones, hypothyroidism     In 9/18/2018, labs she was evaluated by Dr. Meliza Kim for a positive SALVADOR. Labs showed positive anti-centromere >8.0, negative SALVADOR IFA, anti-dsDNA Ab, anti-Sm, anti-Ro, anti-La, anti-RNP, anti-Mia-1, lupus anticoagulant, beta-2-glycoprotein-1, anti-cardiolipin, normal C3 142 mg/dL, C4 28 mg/dL. She was diagnosed with fibromyalgia. In 12/18/2018, stress test showed Normal study. This was a normal stress echocardiography study. In 1/12/2021, she followed up with her PCP. She discussed a history of a positive SALVADOR. In 2/26/2021, labs showed WBC 9.1, lymphocytes 1.6, Hct 40.1%, platelets 221,546, creatinine 0.55 mg/dL, eGFR 103, albumin 4.0 g/dL, ALK 66 U/L, ALT 14 U/L, AST 11 U/L, ESR 10 mm/hr, TSH 1.800, positive SALVADOR direct, anti-centromere >8.0, anti-dsDNA, anti-RNP, anti-Sm, anti-Ro, anti-La, anti-Mia-1, anti-centromere, anti-Scl-70, rheumatoid factor, anti-CCP, 14.3.3 ETA. In 3/22/2021, she went to ED for dyspnea. Labs showed WBC 11.7, lymphocytes 1.2, Hct 43.2%, platelets 085,209, creatinine 0.69 mg/dL, eGFR >60, albumin 3.7 g/dL, ALK 68 U/L,  U/L, AST 73 U/L, . Chest radiograph showed Cardiomediastinal silhouette is normal. Pulmonary vasculature is engorged. No focal parenchymal opacities, effusions, or pneumothorax. Bony thorax is intact.     Today, she feels like she is falling apart over the past 6 months. In the summer she had a kidney stone, she had to have surgery but developed swelling in her hands and feet that persisted after her surgery.  She continues to have swelling and puffiness in her hands that is worse in the morning and evening but is constant and redness. She has tightness in her hands that is constant. She feels the cuticles skin are tight. She also feel similar symptoms in her feet. She also feels heat sensation on her hands and feet. She denies Raynaud's but endorses dusky appearance of her digits at time. She also started having worsening dyspnea. She has a hard time getting a deep breath. She is only able to have a deep breath if she lays down. If she goes up and down stairs, she starts panting. She does not feel this is related to anxiety. She denies orthostasis. She denies orthopnea. She has a history of GERD, that she thought was weight. She used to have globus sensation and regurgitation from July to January that resolved with pantoprazole. She has not had an EGD or follow with a GI. She complains of outer shoulder and outer hip pain at night that wakes her up and she needs to keep flipping due to the pain. She is a side sleeper. Therapy History includes:  Current DMARD therapy include: None  Prior DMARD therapy include: None  Discontinued DMARDs because of inefficacy: None  Discontinued DMARDs because of side effects: None    REVIEW OF SYSTEMS    A 15 point review of systems was performed and summarized below. The questionnaire was reviewed with the patient and scanned into the patient's medical record.     General: endorses fatigue, recent weight gain, denies recent weight loss, weakness, fever, drenching night sweats  Musculoskeletal: endorses joint pain, joint swelling, morning stiffness (lasting 60 minutes), muscle pain  Ears: denies ringing in ears, hearing loss, deafness  Eyes: denies pain, light sensitive, redness, blindness, double vision, blurred vision, excess tearing, dryness, foreign body sensation  Mouth: denies sore tongue, oral ulcers, loss of taste, dryness, increased dental caries  Nose: denies nosebleeds, nasal ulcers  Throat: endorses difficulty with swallowing, denies food stuck when swallowing, hoarseness, pain in jaw while chewing  Neck: denies swollen glands, tender glands  Cardiopulmonary: endorses shortness of breath on exertion, denies pain in chest with deep breaths, pain in chest when lying down, murmurs, sudden changes in heart beat, wheezing, dry cough, productive cough, shortness of breath at rest, coughing of blood  Gastrointestinal: denies nausea, heartburn, stomach pain relieved by food, chronic constipation, chronic diarrhea, blood in stools, black stools  Genitourinary: denies vaginal dryness, pain or burning on urination, blood in urine, cloudy urine, vaginal ulcers   Hematologic: denies anemia, bleeding tendency, blood clots, bleeding gums  Skin: endorses hair loss, skin tightness, denies easy bruising, rash, rash worsened after sun exposure, hives/urticaria, skin thickening, nodules/bumps, color changes of hands or feet in the cold (Raynaud's)  Neurologic: endorses numbness or tingling in hands, numbness or tingling in feet, muscle weakness  Psychiatric: endorses depression, excessive worries, denies PTSD, Bipolar  Sleep: endorses poor sleep (5-6 hours), difficulty falling asleep, difficulty staying asleep , denies snoring, apnea, daytime somnolence    PAST MEDICAL HISTORY    She has a past medical history of Anxiety, Asthma (2008), Cancer ([facility] [address] Foot fracture, Gall stones, Hyperlipidemia, Hypothyroidism, Interstitial cystitis, Interstitial cystitis, Kidney stones, Menopausal disorder, Other ill-defined conditions(799.89), PONV (postoperative nausea and vomiting), Psychiatric disorder (11/10), and Thyroid disease. FAMILY HISTORY    Her family history includes Cancer in her mother; Heart Disease in her brother and father. SOCIAL HISTORY    She reports that she quit smoking about 31 years ago. Her smoking use included cigarettes. She has a 12.00 pack-year smoking history.  She has never used smokeless tobacco. She reports previous alcohol use. She reports that she does not use drugs. HEALTH MAINTENANCE    Immunizations  Immunization History   Administered Date(s) Administered    Influenza Vaccine 10/18/2013, 10/21/2016, 10/10/2020    Influenza Vaccine (>6 mo Afluria QUAD Vial 35009 (0.25 mL) / 29658 (0.5 mL)) 11/07/2014    Influenza Vaccine CurrieTakWak) PF (>6 Mo Flulaval, Fluarix, and >3 Yrs Afluria, Fluzone 44601) 12/02/2015, 11/10/2017, 11/07/2018, 10/30/2019    Influenza Vaccine Split 09/19/2012    Influenza Vaccine Whole 11/04/2010    Tdap 11/07/2010         MEDICATIONS    Current Outpatient Medications   Medication Sig    albuterol (PROVENTIL HFA, VENTOLIN HFA, PROAIR HFA) 90 mcg/actuation inhaler Take 1 Puff by inhalation every six (6) hours as needed for Wheezing.  montelukast (SINGULAIR) 10 mg tablet Take 1 Tab by mouth daily.  pantoprazole (PROTONIX) 40 mg tablet TAKE 1 TAB BY MOUTH TWO (2) TIMES A DAY. INDICATIONS: INDIGESTION (Patient taking differently: Take 40 mg by mouth as needed. Indications: indigestion)    Clifton Thyroid 90 mg tablet TAKE 1 TABLET BY MOUTH  DAILY    fexofenadine (ALLEGRA) 180 mg tablet Take 90 mg by mouth.  cyanocobalamin (VITAMIN B-12) 1,000 mcg tablet Take 5,000 mcg by mouth daily.  estradiol-norethindrone (COMBIPATCH) 0.05-0.25 mg/24 hr 1 Patch by TransDERmal route two (2) times a week. No current facility-administered medications for this visit.       ALLERGIES    Allergies   Allergen Reactions    Adhesive Other (comments)     Eat skin off    Codeine Anaphylaxis    Codeine Sulfate Anaphylaxis    Darvon [Propoxyphene] Itching    Percocet [Oxycodone-Acetaminophen] Itching    Sulfa (Sulfonamide Antibiotics) Hives    Vicodin [Hydrocodone-Acetaminophen] Itching    Avocado Swelling    Demerol [Meperidine] Itching    Dilaudid [Hydromorphone] Itching    Lexapro [Escitalopram] Other (comments)     shake    Sulfamethoxazole-Trimethoprim Rash       PHYSICAL EXAMINATION    Visit Vitals  /82   Pulse 76   Temp 96.8 °F (36 °C)   Resp 18   Ht 5' (1.524 m)   Wt 166 lb (75.3 kg)   BMI 32.42 kg/m²     Body mass index is 32.42 kg/m². General: Patient is alert, oriented x 3, not in acute distress    HEENT:   Conjunctiva are not injected and appear moist, oral mucous membranes are moist, there are no ulcers present, neck is supple, there is no lymphadenopathy    Cardiovascular:  Heart is regular rate and rhythm, no murmurs. Chest:  Lungs are clear to auscultation bilaterally. Extremities:  Free of clubbing, cyanosis, edema, extremities well perfused. Neurological exam:  Muscle strength is full in upper and lower extremities. Skin exam:    Sclerodactyly:    no  Scleroderma:    no  Puffy Fingers:    yes  Raynaud's Phenomenon:  no  Telangiectasia:   no  Digital Pits:    no  Digital Ulcers:    no  Rash:     no  Livedo Reticularis:   no  Periungual Erythema:   no  Abnormal Nailfold Capillaries: no  Calcium Cutis     no    Slightly hyperpigmented macule oblong in shape on midthoracic spine without erythema or induration (not morphea)    Musculoskeletal exam:  A comprehensive musculoskeletal exam was performed for all joints of each upper and lower extremity and assessed for swelling, tenderness and range of motion. Pertinent results are documented as below:    Right heberden nodes  Bilateral trochanteric bursa tenderness  Bilateral iliotibial band tnederness    Synovitis:   no    Tendon Friction Rubs: no   Joint Effusion:   no       Joint Count 3/24/2021   Patient pain (0-100) 25   MHAQ 0.125   Patient Assessment (0-10) 5       DATA REVIEW    Studies Reviewed:     Prior medical records were reviewed and are summarized as below:    Laboratory data: summarized in the HPI    Imaging: summarized in the HPI.      ASSESSMENT AND PLAN    1) Limited Systemic Scleroderma. (puffy fingers, SALVADOR direct, anti-centromere >8.0)    Cutaneous. She has puffy fingers at this time, without scleroderma. She does not have Raynaud's, calcinosis, or telangiectasia. I explained that there is no proven effective treatment for puffy fingers. It may progress or remain stable. She does not have other cutaneous futures of scleroderma at this time. Gastrointestinal. She has a history of GERD attributed to weight gain. This worsened over last summer to 1/2021 that resolved with intermittent dosing of pantoprazole, which she does not require daily. She denies GERD at this time. She has not had an EGD. She does not have anemia. Cardiopulmonary. She endorses worsening dyspnea on exertion, which she does not feel is related to her anxiety. She denies orthostasis. She denies orthopnea. She had a recent CXR on 3/22/2021 which showed engorged pulmonary vasculature without interstitial lung disease, suggesting pulmonary hypertension. She did not have a loud P2 on exam. I will evaluate her for pulmonary hypertension with an echocardiogram and PFTs. Musculoskeletal. She has osteoarthritis. She has trochanteric bursitis and IT band. Renal. Her blood pressure was 129/82. Recommendations:    - I ordered pulmonary function tests   - I ordered echocardiogram  - I ordered labs. - I referred her to physical therapy. 2) Bilateral Trochanteric Bursitis, Iliotibial Band Syndrome and Rotator Cuff Arthropathy. I referred her to physical therapy. The patient voiced understanding of the aforementioned assessment and plan. A total of 70 minutes was spent on this visit, reviewing interval notes, interval testing results, ordering tests, refilling medications, documenting the findings in the note, patient education, counseling, and coordination of care as described above. All questions asked and answered.     TODAY'S ORDERS    Orders Placed This Encounter    RNA POLYMERASE-III ANTIBODY, IGG    MYOMARKER 3 PLUS PROFILE (RDL)    ANTI-NUCLEAR AB BY IFA (RDL)    REFERRAL TO PHYSICAL THERAPY    REFERRAL TO PHYSICAL THERAPY    REFERRAL TO PHYSICAL THERAPY    2D ECHO COMPLETE (PEDS/ADULT)    PFT COMPLETE    PFT DLCO     Future Appointments   Date Time Provider Dayne Hsieh   4/9/2021  8:00 AM PULMONARY LAB [facility]   4/9/2021  9:00 AM ECHOCARDIOGRAM ROOM [facility] [address] REG   7/21/2021  1:40 PM Oneil Donahue MD AOCR BS AMB       Trina Lawrence MD, [address] [facility]    Adult Rheumatology   Rheumatology Ultrasound Certified  [address] [facility] [address]  [facility], [address] [place] [address]   Phone [phone]  Fax [phone]    Patient Care Team:  Mio Vincent MD as PCP - General (Internal Medicine)  Mio Vincent MD as PCP - [facility] Empaneled Provider  Fara Ashraf MD (Cardiology)

## 2021-03-24 NOTE — LETTER
3/24/2021 Patient: Lyla Schmidt YOB: 1960 Date of Visit: 3/24/2021 Willy Murillo MD 
[facility] [address] [address] [address] [employer] Dear Willy Murillo MD, Thank you for referring Ms. Angelia Dominguez to [facility] for evaluation. My notes for this consultation are attached. If you have questions, please do not hesitate to call me. I look forward to following your patient along with you.  
 
 
Sincerely, 
 
Rosita Casey MD

## 2021-03-24 NOTE — PATIENT INSTRUCTIONS
Please call the Patient Care Scheduling Team [phone] to schedule your test (Echocardiogram, PFTs)

## 2021-03-29 ENCOUNTER — HOSPITAL ENCOUNTER (OUTPATIENT)
Dept: PREADMISSION TESTING | Age: 61
Discharge: HOME | End: 2021-03-29
Payer: COMMERCIAL

## 2021-03-29 LAB — SARS-COV-2, COV2: NORMAL

## 2021-03-29 PROCEDURE — U0003 INFECTIOUS AGENT DETECTION BY NUCLEIC ACID (DNA OR RNA); SEVERE ACUTE RESPIRATORY SYNDROME CORONAVIRUS 2 (SARS-COV-2) (CORONAVIRUS DISEASE [COVID-19]), AMPLIFIED PROBE TECHNIQUE, MAKING USE OF HIGH THROUGHPUT TECHNOLOGIES AS DESCRIBED BY CMS-2020-01-R: HCPCS

## 2021-03-30 LAB — SARS-COV-2, COV2NT: NOT DETECTED

## 2021-04-02 ENCOUNTER — TRANSCRIBE ORDER (OUTPATIENT)
Dept: REGISTRATION | Age: 61
End: 2021-04-02

## 2021-04-02 ENCOUNTER — HOSPITAL ENCOUNTER (OUTPATIENT)
Dept: NON INVASIVE DIAGNOSTICS | Age: 61
Discharge: HOME | End: 2021-04-02
Attending: INTERNAL MEDICINE
Payer: COMMERCIAL

## 2021-04-02 ENCOUNTER — HOSPITAL ENCOUNTER (OUTPATIENT)
Dept: PULMONOLOGY | Age: 61
Discharge: HOME | End: 2021-04-02
Attending: INTERNAL MEDICINE
Payer: COMMERCIAL

## 2021-04-02 VITALS
DIASTOLIC BLOOD PRESSURE: 82 MMHG | BODY MASS INDEX: 32.59 KG/M2 | HEIGHT: 60 IN | WEIGHT: 166.01 LBS | SYSTOLIC BLOOD PRESSURE: 129 MMHG

## 2021-04-02 DIAGNOSIS — R06.09 DYSPNEA ON EXERTION: ICD-10-CM

## 2021-04-02 DIAGNOSIS — M34.9 LIMITED SYSTEMIC SCLEROSIS (HCC): ICD-10-CM

## 2021-04-02 DIAGNOSIS — M34.9 SYSTEMIC SCLEROSIS (HCC): ICD-10-CM

## 2021-04-02 DIAGNOSIS — M34.9 SYSTEMIC SCLEROSIS (HCC): Primary | ICD-10-CM

## 2021-04-02 LAB
ECHO AO ROOT DIAM: 2.98 CM
ECHO AV AREA PEAK VELOCITY: 1.47 CM2
ECHO AV AREA PEAK VELOCITY: 1.48 CM2
ECHO AV AREA PEAK VELOCITY: 1.52 CM2
ECHO AV AREA PEAK VELOCITY: 1.53 CM2
ECHO AV AREA VTI: 1.32 CM2
ECHO AV AREA/BSA VTI: 0.8 CM2/M2
ECHO AV MEAN GRADIENT: 4.06 MMHG
ECHO AV PEAK GRADIENT: 6.86 MMHG
ECHO AV PEAK GRADIENT: 7.34 MMHG
ECHO AV PEAK VELOCITY: 130.93 CM/S
ECHO AV PEAK VELOCITY: 135.37 CM/S
ECHO AV VTI: 28.82 CM
ECHO EST RA PRESSURE: 10 MMHG
ECHO LA AREA 4C: 14.82 CM2
ECHO LA MAJOR AXIS: 3.08 CM
ECHO LA MINOR AXIS: 1.79 CM
ECHO LA VOL 4C: 38.14 ML (ref 22–52)
ECHO LA VOLUME INDEX A4C: 22.12 ML/M2 (ref 16–28)
ECHO LV E' LATERAL VELOCITY: 11.27 CM/S
ECHO LV E' SEPTAL VELOCITY: 11.16 CM/S
ECHO LV INTERNAL DIMENSION DIASTOLIC: 4.4 CM (ref 3.9–5.3)
ECHO LV INTERNAL DIMENSION SYSTOLIC: 2.97 CM
ECHO LV IVSD: 0.84 CM (ref 0.6–0.9)
ECHO LV MASS 2D: 123.3 G (ref 67–162)
ECHO LV MASS INDEX 2D: 71.5 G/M2 (ref 43–95)
ECHO LV POSTERIOR WALL DIASTOLIC: 0.91 CM (ref 0.6–0.9)
ECHO LVOT CARDIAC OUTPUT: 2.76 LITER/MINUTE
ECHO LVOT DIAM: 1.47 CM
ECHO LVOT PEAK GRADIENT: 5.44 MMHG
ECHO LVOT PEAK GRADIENT: 5.52 MMHG
ECHO LVOT PEAK VELOCITY: 116.6 CM/S
ECHO LVOT PEAK VELOCITY: 117.52 CM/S
ECHO LVOT SV: 38 ML
ECHO LVOT VTI: 22.31 CM
ECHO MV A VELOCITY: 51.1 CM/S
ECHO MV AREA PHT: 3.93 CM2
ECHO MV AREA VTI: 1.4 CM2
ECHO MV E DECELERATION TIME (DT): 246.56 MS
ECHO MV E VELOCITY: 104.78 CM/S
ECHO MV E/A RATIO: 2.05
ECHO MV E/E' LATERAL: 9.3
ECHO MV E/E' RATIO (AVERAGED): 9.34
ECHO MV E/E' SEPTAL: 9.39
ECHO MV EROA PISA: 0.13 CM2
ECHO MV MAX VELOCITY: 124.86 CM/S
ECHO MV MEAN GRADIENT: 2.04 MMHG
ECHO MV PEAK GRADIENT: 6.24 MMHG
ECHO MV PRESSURE HALF TIME (PHT): 55.96 MS
ECHO MV REGURGITANT RADIUS PISA: 0.59 CM
ECHO MV REGURGITANT VOLUME: 21.86 ML
ECHO MV REGURGITANT VTIA: 171.36 CM
ECHO MV VTI: 27.16 CM
ECHO PV MAX VELOCITY: 85.04 CM/S
ECHO PV MAX VELOCITY: 92.86 CM/S
ECHO PV MEAN GRADIENT: 1.89 MMHG
ECHO PV PEAK INSTANTANEOUS GRADIENT SYSTOLIC: 2.89 MMHG
ECHO PV PEAK INSTANTANEOUS GRADIENT SYSTOLIC: 3.45 MMHG
ECHO PV VTI: 19.88 CM
ECHO RA AREA 4C: 12.75 CM2
ECHO RIGHT VENTRICULAR SYSTOLIC PRESSURE (RVSP): 31.61 MMHG
ECHO TV REGURGITANT MAX VELOCITY: 232.35 CM/S
ECHO TV REGURGITANT MAX VELOCITY: 459.51 CM/S
ECHO TV REGURGITANT PEAK GRADIENT: 21.61 MMHG
LVOT MG: 2.67 MMHG
MR PISA PV: 481.68 CM/S

## 2021-04-02 PROCEDURE — 94729 DIFFUSING CAPACITY: CPT

## 2021-04-02 PROCEDURE — 94375 RESPIRATORY FLOW VOLUME LOOP: CPT

## 2021-04-02 PROCEDURE — 94726 PLETHYSMOGRAPHY LUNG VOLUMES: CPT

## 2021-04-02 PROCEDURE — 93306 TTE W/DOPPLER COMPLETE: CPT

## 2021-04-18 LAB
ANA CENTROSOMAL TITR SER IF: ABNORMAL {TITER}
ANA SER QL IF: POSITIVE
ANTI-MDA-5 AB (CADM-140)(RDL): <20 UNITS
ANTI-NXP-2 (P140) AB (RDL): <20 UNITS
ANTI-RNA POLYMERASE III (RDL): <20 UNITS
ANTI-SAE1 AB, IGG (RDL): <20 UNITS
ANTI-TIF-1GAMMA AB (RDL): <20 UNITS
EJ AB SER QL: NEGATIVE
ENA JO1 AB SER IA-ACNC: <20 UNITS
ENA PM/SCL AB SER-ACNC: <20 UNITS
ENA SS-A 52KD IGG SER IA-ACNC: <20 UNITS
FIBRILLARIN AB SER QL: NEGATIVE
KU AB SER QL: NEGATIVE
MI2 AB SER QL: NEGATIVE
NOTE, 018286: ABNORMAL
OJ AB SER QL: NEGATIVE
PL12 AB SER QL: NEGATIVE
PL7 AB SER QL: NEGATIVE
SRP AB SERPL QL: NEGATIVE
U1 SNRNP AB SER IA-ACNC: <20 UNITS
U2 SNRNP AB SER QL: NEGATIVE

## 2021-04-19 NOTE — PROGRESS NOTES
The results were reviewed. Positive SALVADOR IFA RDL >1:1280 (centromere). All remaining labs are normal/negative.

## 2021-05-25 NOTE — PROCEDURES
[address]  PULMONARY FUNCTION TEST    Name:  Srinivas Terrazas  MR#:  [id number]  :  1960  ACCOUNT #:  [de-identified]  DATE OF SERVICE:  2021    REASON FOR THE TEST:  Shortness of breath. Spirometry and lung volumes were performed and they reveal:  1. Very mild airflow obstruction. 2.  No restrictive lung disease. 3.  Air trapping is present. 4.  Normal DLCO. 5.  Normal flow-volume loop.       Gee Villeda MD      [id number]  D:  2021 11:14  T:  2021 22:25  JOB #:  [id number]  CC:  Frantz Guardado MD

## 2021-06-22 RX ORDER — MONTELUKAST SODIUM 10 MG/1
TABLET ORAL
Qty: 30 TABLET | Refills: 1 | Status: SHIPPED | OUTPATIENT
Start: 2021-06-22 | End: 2021-08-17 | Stop reason: ALTCHOICE

## 2021-07-21 ENCOUNTER — OFFICE VISIT (OUTPATIENT)
Dept: RHEUMATOLOGY | Age: 61
End: 2021-07-21
Payer: COMMERCIAL

## 2021-07-21 VITALS
BODY MASS INDEX: 31.83 KG/M2 | WEIGHT: 163 LBS | HEART RATE: 87 BPM | DIASTOLIC BLOOD PRESSURE: 83 MMHG | SYSTOLIC BLOOD PRESSURE: 147 MMHG | TEMPERATURE: 98.1 F | RESPIRATION RATE: 18 BRPM

## 2021-07-21 DIAGNOSIS — M34.9 LIMITED SYSTEMIC SCLEROSIS (HCC): Primary | ICD-10-CM

## 2021-07-21 DIAGNOSIS — R53.82 CHRONIC FATIGUE: ICD-10-CM

## 2021-07-21 PROCEDURE — 99215 OFFICE O/P EST HI 40 MIN: CPT | Performed by: INTERNAL MEDICINE

## 2021-07-21 RX ORDER — FAMOTIDINE 10 MG/1
10 TABLET ORAL
COMMUNITY
End: 2021-10-21 | Stop reason: ALTCHOICE

## 2021-07-21 RX ORDER — PHENOL/SODIUM PHENOLATE
10 AEROSOL, SPRAY (ML) MUCOUS MEMBRANE DAILY
COMMUNITY
End: 2022-09-12 | Stop reason: DRUGHIGH

## 2021-07-21 NOTE — PROGRESS NOTES
REASON FOR VISIT    This is a follow-up visit for Ms. Woods for     ICD-10-CM   1. Limited systemic sclerosis (HCC)  M34.9     Therapy History includes:  Current DMARD therapy include: None  Prior DMARD therapy include: None  Discontinued DMARDs because of inefficacy: None  Discontinued DMARDs because of side effects: None    INTERVAL HISTORY    On her last visit, I ordered labs, PFT and an echocardiogram. Labs showed positive SALVADOR IFA RDL >1:1280 (centromere), negative RNA Zach III and RDL myositis panel    Most recent toxicity monitoring by blood work was done on 3/22/2021 and did not reveal any significant adverse effects. I reviewed the patient's interval medical history, surgical history, medications, and allergies. The patient has not had any interval hospital admissions, infections, or surgeries. SCLERODERMA REVIEW OF SYSTEMS     Cutaneous. She feels her skin is still puffy . She denies new areas of skin thickening. She denies pruritis. She remains on no disease-modifying therapy. Raynaud's phenomenon has been active in the white with blue discoloration around her right third digit that occurs randomly. She notes that her body temperature may switch from sweating to being very cold. Her feel are very cold. She has color changes and denies ulcerations. She is on no therapy. Gastrointestinal. She gastrointestinal symptoms have been stable. GERD is better controlled on current acid lowering therapy on omeprazole 20 mg daily. She denies dysphagia. She denies having diarrhea or constipation. She has an EGD on 6/22/2021 which showed GAVE. Cardiopulmonary. The patient's breathing has been stable. She gets dyspnea with exertion, such as walking up stairs or walking with her dog. She has a persistent cough. She always feels globus and need to swallow. She denies lower extremity edema, lightheadedness but has orthopnea. PFT on 4/02/2021 did not show restrictive disease.  Echocardiogram on 4/02/2021 did not show pulmonary hypertension. Musculoskeletal. She has been having joint issues. Muscle strength is weak in her arms. She feels like she cannot lift something for an extended period of time but also has pain. Renal. She blood pressure has not been controlled but she has anxiety, her numbers are high and when she is calm it is in the one teens. Membranes. She denies dry eyes and denies dry mouth. REVIEW OF SYSTEMS    A comprehensive review of systems was performed and pertinent results are documented in the HPI, review of systems is otherwise non-contributory. PAST MEDICAL HISTORY    She has a past medical history of Anxiety, Asthma (2008), Cancer [facility]), Foot fracture, Gall stones, Hyperlipidemia, Hypothyroidism, Interstitial cystitis, Interstitial cystitis, Kidney stones, Menopausal disorder, Other ill-defined conditions(799.89), PONV (postoperative nausea and vomiting), Psychiatric disorder (11/10), and Thyroid disease. FAMILY HISTORY    Her family history includes Cancer in her mother; Heart Disease in her brother and father. SOCIAL HISTORY    She reports that she quit smoking about 31 years ago. Her smoking use included cigarettes. She has a 12.00 pack-year smoking history. She has never used smokeless tobacco. She reports previous alcohol use. She reports that she does not use drugs. MEDICATIONS     Current Outpatient Medications   Medication Sig    Omeprazole delayed release (PRILOSEC D/R) 20 mg tablet Take 20 mg by mouth daily.  famotidine (PEPCID) 10 mg tablet Take 10 mg by mouth nightly.  montelukast (SINGULAIR) 10 mg tablet TAKE 1 TABLET BY MOUTH EVERY DAY    albuterol (PROVENTIL HFA, VENTOLIN HFA, PROAIR HFA) 90 mcg/actuation inhaler Take 1 Puff by inhalation every six (6) hours as needed for Wheezing.  [place] Thyroid 90 mg tablet TAKE 1 TABLET BY MOUTH  DAILY    cyanocobalamin (VITAMIN B-12) 1,000 mcg tablet Take 5,000 mcg by mouth daily.     estradiol-norethindrone (COMBIPATCH) 0.05-0.25 mg/24 hr 1 Patch by TransDERmal route two (2) times a week.  pantoprazole (PROTONIX) 40 mg tablet TAKE 1 TAB BY MOUTH TWO (2) TIMES A DAY. INDICATIONS: INDIGESTION (Patient taking differently: Take 40 mg by mouth as needed. Indications: indigestion)     No current facility-administered medications for this visit. ALLERGIES:     Allergies   Allergen Reactions    Adhesive Other (comments)     Eat skin off    Codeine Anaphylaxis    Codeine Sulfate Anaphylaxis    Darvon [Propoxyphene] Itching    Percocet [Oxycodone-Acetaminophen] Itching    Sulfa (Sulfonamide Antibiotics) Hives    Vicodin [Hydrocodone-Acetaminophen] Itching    Avocado Swelling    Demerol [Meperidine] Itching    Dilaudid [Hydromorphone] Itching    Lexapro [Escitalopram] Other (comments)     shake    Sulfamethoxazole-Trimethoprim Rash       PHYSICAL EXAMINATION    Visit Vitals  BP (!) 147/83   Pulse 87   Temp 98.1 °F (36.7 °C)   Resp 18   Wt 163 lb (73.9 kg)   BMI 31.83 kg/m²     Body mass index is 31.83 kg/m². General: Patient is alert, oriented x 3, not in acute distress    HEENT:   Conjunctiva are not injected and appear moist, oral mucous membranes are moist, there are no ulcers present, there is no alopecia, neck is supple, there is no lymphadenopathy. There are are not scleroderma skin changes present in the face. Mauskopf is not present. Cardiovascular:  Heart is regular rate and rhythm, no murmurs. Chest:  Lungs reveal no crackles. Extremities:  Free of clubbing, cyanosis, edema, extremities well perfused. Neurological exam:  Muscle strength is full in upper and lower extremities.     Skin exam:    Sclerodactyly:    no  Scleroderma:    no (Modified Rodnan Score N/A)  Puffy Fingers:    yes  Raynaud's Phenomenon:  no  Telangiectasia:   Yes (right 3rd)  Digital Pits:    no  Digital Ulcers:    no  Rash:     no  Livedo Reticularis:   no  Periungual Erythema:   no  Abnormal Nailfold Capillaries: Yes (right 5th, left: 4th)  Calcium Cutis     no     Slightly hyperpigmented macule oblong in shape on midthoracic spine without erythema or induration (not morphea)      Musculoskeletal exam:  A comprehensive musculoskeletal exam was performed for all joints of each upper and lower extremity and assessed for swelling, tenderness and range of motion. Right heberden nodes  Bilateral trochanteric bursa tenderness  Bilateral iliotibial band tenderness    Synovitis:   no    Tendon Friction Rubs: no   Joint Effusion:   no       DATA REVIEW     Laboratory     Recent laboratory results were reviewed, summarized, and discussed with the patient. Scleroderma Studies    Pulmonary Functions Testing    PFT 4/02/2021: FVC of 2.94 (112%), FEV1 of 1.75 (91%), FEV1/FVC of 60% and a DLCO of 19.9 (98%), TLC 5.52 (135%), VC 2.94 (112%). Echocardiogram    Echocardiogram 4/02/2021: LV: Estimated LVEF is 55 - 60%. Normal cavity size, wall thickness, systolic function (ejection fraction normal) and diastolic function. Pulmonary arterial systolic pressure (PASP) is 33 mmHg. Pulmonary hypertension not suggested by Doppler findings    Imaging    Musculoskeletal Ultrasound    None    Radiographs    Chest 3/22/2021: Cardiomediastinal silhouette is normal. Pulmonary vasculature is engorged. No focal parenchymal opacities, effusions, or pneumothorax. Bony thorax is intact. CT Imaging    None    MR Imaging    None    DXA     None    GI Studies    EGD 6/29/2021: Normal duodenum. Irregular Z-line in the gastroesophageal junction. Normal mucosa in the middle third of the esophagus. Granularity in the stomach body. Streaky erythema in the antrum compatible with GAVE. PATHOLOGY    Biopsy, antrum 6/29/2021: Reactive gastropathy. No evidence of gastritis. Helicobacter stain is negative. Biopsy, stomach body 6/29/2021: Unremarkable oxyntic mucosa. No evidence of gastritis or gastropathy.   The Helicobacter stain is negative. Biopsy, esophagus, middle third 6/29/2021: Unremarkable squamous mucosa. No evidence of intestinal metaplasia, reflux or eosinophilia. ASSESSMENT AND PLAN    1) Limited Systemic Scleroderma. (puffy fingers, telangiectasia, abnormal nailfold, Raynaud's phenomenon, GERD, GAVE, SALVADOR IFA RDL >1:1280 (centromere), anti-centromere >8.0)     Cutaneous. She has puffy fingers, without scleroderma, which is stable. She had an interval development of Raynaud's, telangiectasia, and abnormal nailfold capillaries involving a few digits. She does not have calcinosis cutis. We discussed medical management with CCB if needed. I discussed possible digital ulcers. Primary treatment is to keep warm.     Gastrointestinal. She has a GERD with new diagnosis of GAVE. She is now on omeprazole 20 mg daily with improvement of her GERD. She also takes famotidine 10 mg daily. She has had an EGD on 6/29/2021. She does not have anemia. Repeat CBC is appropriate. I asked her tl met know if omeprazole is not controlling her GERD.     Cardiopulmonary. She continues to have dyspnea on exertion, such as waking dog or going up the stairs. PFT showed reactive airway disease but no restrictive lung disease. Her echocardiogram did not show pulmonary artery hypertension. She had a recent CXR on 3/22/2021 which showed engorged pulmonary vasculature but no interstitial lung disease. I asked her to use her albuterol inhaler regularly but to see pulmonary or PCP for treatment with LAB. We discussed the need to monitor with pulmonary function tests and echocardiogram.    Musculoskeletal. She has osteoarthritis. She has trochanteric bursitis and IT band.     Renal. Her blood pressure was 147/83 from white coat.     Recommendations:    - omeprazole 20 mg daily  - CBC for GAVE-induced anemia  - repeat PFT/echocardiogram in 10/2021    2) Bilateral Trochanteric Bursitis, Iliotibial Band Syndrome and Rotator Cuff Arthropathy.  I had referred her to physical therapy. The patient voiced understanding of the aforementioned assessment and plan. Summary of plan was provided in the After Visit Summary patient instructions. A total of 50 minutes was spent on this visit, reviewing interval notes, interval testing results, ordering tests, refilling medications, documenting the findings in the note, patient education, counseling, and coordination of care as described above. All questions asked and answered.     TODAY'S ORDERS    Orders Placed This Encounter    CBC WITH AUTOMATED DIFF     Future Appointments   Date Time Provider Dayne Мария   10/21/2021  2:20 PM Romelia Donahue MD AOCR BS AMB     Rai Toro MD, [address] [facility] [place]    Adult Rheumatology   Rheumatology Ultrasound Certified  [address] [facility] [address]  [facility], [address] [place] [address]   Phone [phone]  Fax [phone]

## 2021-07-21 NOTE — LETTER
7/21/2021    Patient: Bev Gold   YOB: 1960   Date of Visit: 7/21/2021     Rupesh Perry MD  [address]  [address] [facility] [address] [place] [address]  Via In Basket    Dear Rupesh Perry MD,      Thank you for referring Ms. Jerri Jones to [facility] for evaluation. My notes for this consultation are attached. If you have questions, please do not hesitate to call me. I look forward to following your patient along with you.       Sincerely,    Sherice Peck MD

## 2021-07-29 LAB
BASOPHILS # BLD AUTO: 0.1 X10E3/UL (ref 0–0.2)
BASOPHILS NFR BLD AUTO: 1 %
EOSINOPHIL # BLD AUTO: 0.2 X10E3/UL (ref 0–0.4)
EOSINOPHIL NFR BLD AUTO: 2 %
ERYTHROCYTE [DISTWIDTH] IN BLOOD BY AUTOMATED COUNT: 12.4 % (ref 11.7–15.4)
HCT VFR BLD AUTO: 40.3 % (ref 34–46.6)
HGB BLD-MCNC: 13.5 G/DL (ref 11.1–15.9)
IMM GRANULOCYTES # BLD AUTO: 0 X10E3/UL (ref 0–0.1)
IMM GRANULOCYTES NFR BLD AUTO: 0 %
LYMPHOCYTES # BLD AUTO: 1.4 X10E3/UL (ref 0.7–3.1)
LYMPHOCYTES NFR BLD AUTO: 17 %
MCH RBC QN AUTO: 29.9 PG (ref 26.6–33)
MCHC RBC AUTO-ENTMCNC: 33.5 G/DL (ref 31.5–35.7)
MCV RBC AUTO: 89 FL (ref 79–97)
MONOCYTES # BLD AUTO: 0.9 X10E3/UL (ref 0.1–0.9)
MONOCYTES NFR BLD AUTO: 11 %
NEUTROPHILS # BLD AUTO: 5.7 X10E3/UL (ref 1.4–7)
NEUTROPHILS NFR BLD AUTO: 69 %
PLATELET # BLD AUTO: 318 X10E3/UL (ref 150–450)
RBC # BLD AUTO: 4.52 X10E6/UL (ref 3.77–5.28)
WBC # BLD AUTO: 8.2 X10E3/UL (ref 3.4–10.8)

## 2021-08-17 ENCOUNTER — OFFICE VISIT (OUTPATIENT)
Dept: INTERNAL MEDICINE CLINIC | Age: 61
End: 2021-08-17
Payer: COMMERCIAL

## 2021-08-17 VITALS
TEMPERATURE: 97.9 F | BODY MASS INDEX: 32.28 KG/M2 | RESPIRATION RATE: 16 BRPM | HEIGHT: 60 IN | HEART RATE: 77 BPM | OXYGEN SATURATION: 99 % | SYSTOLIC BLOOD PRESSURE: 138 MMHG | WEIGHT: 164.4 LBS | DIASTOLIC BLOOD PRESSURE: 86 MMHG

## 2021-08-17 DIAGNOSIS — R94.2 ABNORMAL PFTS: Primary | ICD-10-CM

## 2021-08-17 DIAGNOSIS — Z87.891 HX OF SMOKING: ICD-10-CM

## 2021-08-17 DIAGNOSIS — R06.00 DYSPNEA, UNSPECIFIED TYPE: ICD-10-CM

## 2021-08-17 PROCEDURE — 99214 OFFICE O/P EST MOD 30 MIN: CPT | Performed by: INTERNAL MEDICINE

## 2021-08-17 RX ORDER — UMECLIDINIUM BROMIDE AND VILANTEROL TRIFENATATE 62.5; 25 UG/1; UG/1
1 POWDER RESPIRATORY (INHALATION) DAILY
Qty: 2 INHALER | Refills: 0 | Status: SHIPPED | COMMUNITY
Start: 2021-08-17 | End: 2021-10-15 | Stop reason: ALTCHOICE

## 2021-08-17 NOTE — PROGRESS NOTES
Ms. Evans Oleary is presenting to follow up     CC:  Results (Discuss Pulmonary Function Test order by Rheumatologist Dr Camacho Emanuel)       HPI:       Anxiety: patient was doing well on buspar and successfully tapered off of xanax. Unfortunately patient developed side effects to Buspar medication stopped. She is doing well from anxiety standpoint. She did not tolerate SSRI     Positive SALVADOR and centromere B Ab/positive Raynaud's-noted some skin changes. Patient saw Dr. Camacho Emanuel. She was very pleased with her visit. Normal echocardiogram without evidence of pulmonary HTN   She has an EGD on 6/22/2021 which showed GAVE. PFT on 4/02/2021 did not show restrictive disease. Echocardiogram on 4/02/2021 did not show pulmonary hypertension. She continues to have dyspnea on exertion, such as waking dog or going up the stairs. Her echocardiogram did not show pulmonary artery hypertension. She had a recent CXR on 3/22/2021 which showed engorged pulmonary vasculature but no interstitial lung disease. Jody Colder Her PFTs showed   1. Very mild airflow obstruction. 2.  No restrictive lung disease. 3.  Air trapping is present. 4.  Normal DLCO. 5.  Normal flow-volume loop. Gets winded going up and down the stairs/ onset in January/ not progressing / but not going away.    She is using albuterol as needed      Singulair has not made any improvement ( told in the past she has allergy induced asthma) therefore she stopped Singulair      Smoked for very short times in her 20s  less then 10 pack years  Normal stress test in 2018       Review of systems:  Constitutional: negative for fever, chills, weight loss, night sweats   10 systems reviewed and negative other than HPI    Past Medical History:   Diagnosis Date    Anxiety     Asthma 2008    [facility]ergy induced [facility] [address] [facility] [address]     basal cell 20 years ago upper abdomen    Foot fracture     Gall stones     Hyperlipidemia     Hypothyroidism     Interstitial cystitis      Jamil at [employer] Interstitial cystitis     Kidney stones     Menopausal disorder     Other ill-defined conditions(475.93)     kidney stones in [place]    PONV (postoperative nausea and vomiting)     severe nausea 1x in the past    Psychiatric disorder 11/10    anxiety attack    Thyroid disease         Past Surgical History:   Procedure Laterality Date    HX ADENOIDECTOMY  1977    HX CHOLECYSTECTOMY      HX DILATION AND CURETTAGE      miscarriage x1    HX ENDOSCOPY      HX GI  2010    cholecystectomy    HX GYN      laparoscopy for fertility    HX OTHER SURGICAL  2006    hemithyroidectomy right, gall bladder removal    HX TONSILLECTOMY  1977    MA BREAST SURGERY PROCEDURE UNLISTED      left breast lumpectomy       Allergies   Allergen Reactions    Adhesive Other (comments)     Eat skin off    Codeine Anaphylaxis    Codeine Sulfate Anaphylaxis    Darvon [Propoxyphene] Itching    Percocet [Oxycodone-Acetaminophen] Itching    Sulfa (Sulfonamide Antibiotics) Hives    Vicodin [Hydrocodone-Acetaminophen] Itching    Avocado Swelling    Demerol [Meperidine] Itching    Dilaudid [Hydromorphone] Itching    Lexapro [Escitalopram] Other (comments)     shake    Sulfamethoxazole-Trimethoprim Rash       Current Outpatient Medications on File Prior to Visit   Medication Sig Dispense Refill    Omeprazole delayed release (PRILOSEC D/R) 20 mg tablet Take 20 mg by mouth two (2) times a day.  albuterol (PROVENTIL HFA, VENTOLIN HFA, PROAIR HFA) 90 mcg/actuation inhaler Take 1 Puff by inhalation every six (6) hours as needed for Wheezing. 1 Inhaler 2    [place] Thyroid 90 mg tablet TAKE 1 TABLET BY MOUTH  DAILY 90 Tab 3    cyanocobalamin (VITAMIN B-12) 1,000 mcg tablet Take 5,000 mcg by mouth daily.  estradiol-norethindrone (COMBIPATCH) 0.05-0.25 mg/24 hr 1 Patch by TransDERmal route two (2) times a week.  famotidine (PEPCID) 10 mg tablet Take 10 mg by mouth nightly.  (Patient not taking: Reported on 2021)      [DISCONTINUED] Destiney Thyroid 90 mg tablet TAKE 1 TABLET BY MOUTH  DAILY 90 Tab 0     No current facility-administered medications on file prior to visit. family history includes Cancer in her mother; Heart Disease in her brother and father. Social History     Socioeconomic History    Marital status:      Spouse name: Not on file    Number of children: Not on file    Years of education: Not on file    Highest education level: Not on file   Occupational History    Not on file   Tobacco Use    Smoking status: Former Smoker     Packs/day: 1.00     Years: 12.00     Pack years: 12.00     Types: Cigarettes     Quit date: 1990     Years since quittin.5    Smokeless tobacco: Never Used   Vaping Use    Vaping Use: Never used   Substance and Sexual Activity    Alcohol use: Not Currently     Comment: rarely    Drug use: Never    Sexual activity: Yes   Other Topics Concern    Not on file   Social History Narrative    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      Social Determinants of Health     Financial Resource Strain:     Difficulty of Paying Living Expenses:    Food Insecurity:     Worried About Running Out of Food in the Last Year:     [address] in the Last Year:    Transportation Needs:     Lack of Transportation (Medical):      Lack of Transportation (Non-Medical):    Physical Activity:     Days of Exercise per Week:     Minutes of Exercise per Session:    Stress:     Feeling of Stress :    Social Connections:     Frequency of Communication with Friends and Family:     Frequency of Social Gatherings with Friends and Family:     Attends Voodoo Services:     Active Member of Clubs or Organizations:     Attends Club or Organization Meetings:     Marital Status:    Intimate Partner Violence:     Fear of Current or Ex-Partner:     Emotionally Abused:     Physically Abused:     Sexually Abused:        Visit Vitals  /86 (BP 1 Location: Left arm, BP Patient Position: Sitting, BP Cuff Size: Adult)   Pulse 77   Temp 97.9 °F (36.6 °C) (Temporal)   Resp 16   Ht 5' (1.524 m)   Wt 164 lb 6.4 oz (74.6 kg)   LMP 12/23/2011   SpO2 99%   BMI 32.11 kg/m²     General:  Well appearing female no acute distress  HEENT:   PERRL,normal conjunctiva. External ear and canals normal, TMs normal.  Hearing normal to voice. Neck:  Supple. Thyroid normal size, nontender, without nodules. No carotid bruit. No masses or lymphadenopathy  Respiratory: no respiratory distress,  no wheezing, no rhonchi, no rales. No chest wall tenderness. Cardiovascular:  RRR, normal S1S2, no murmur. Gastrointestinal: normal bowel sounds, soft, nontender, without masses. No hepatosplenomegaly. Extremities +2 pulses, no edema, normal sensation   Musculoskeletal:  Normal gait. Normal digits and nails. Normal strength and tone, no atrophy, and no abnormal movement. Skin:  No rash, no lesions, no ulcers. Skin warm, normal turgor, without induration or nodules. Neuro:  A and OX4, fluent speech, cranial nerves normal 2-12. Sensation normal to light touch.   DTR symmetrical  Psych:  Normal affect      Lab Results   Component Value Date/Time    WBC 8.2 07/29/2021 07:58 AM    HGB 13.5 07/29/2021 07:58 AM    HCT 40.3 07/29/2021 07:58 AM    PLATELET 446 35/79/8163 07:58 AM    MCV 89 07/29/2021 07:58 AM     Lab Results   Component Value Date/Time    Sodium 138 03/22/2021 07:34 AM    Potassium 3.3 (L) 03/22/2021 07:34 AM    Chloride 107 03/22/2021 07:34 AM    CO2 24 03/22/2021 07:34 AM    Anion gap 7 03/22/2021 07:34 AM    Glucose 93 03/22/2021 07:34 AM    BUN 13 03/22/2021 07:34 AM    Creatinine 0.69 03/22/2021 07:34 AM    BUN/Creatinine ratio 19 03/22/2021 07:34 AM    GFR est AA >60 03/22/2021 07:34 AM    GFR est non-AA >60 03/22/2021 07:34 AM    Calcium 8.3 (L) 03/22/2021 07:34 AM     Lab Results   Component Value Date/Time    Cholesterol, total 182 12/31/2019 08:55 AM    HDL Cholesterol 58 12/31/2019 08:55 AM    LDL, calculated 113 (H) 12/31/2019 08:55 AM    VLDL, calculated 11 12/31/2019 08:55 AM    Triglyceride 57 12/31/2019 08:55 AM     Lab Results   Component Value Date/Time    TSH 1.800 02/26/2021 04:39 PM     Lab Results   Component Value Date/Time    Hemoglobin A1c 5.1 06/10/2019 02:47 PM     Lab Results   Component Value Date/Time    VITAMIN D, 25-HYDROXY 46.0 12/31/2019 08:55 AM                   Assessment and Plan:     1. Abnormal PFTs  2. Hx of smoking ( remote 10 pack years)   3. Dyspnea, unspecified type  Mild restrictive disease with air trapping. Asthma versus COPD. Patient noted no improvement with Singulair. I am going to obtain a CT chest looking for possible evidence of emphysema on scan. I gave her samples for 14 days of Anoro. If she notes improvement in her breathing she will let me know and I will send a prescription. If no improvement then will consider switching to [facility] or Symbicort. - CT CHEST WO CONT; Future  - umeclidinium-vilanteroL (ANORO ELLIPTA) 62.5-25 mcg/actuation inhaler; Take 1 Puff by inhalation daily. Dispense: 1 Inhaler; Refill: 0    Of note she has had a cardiac work-up including echo of the heart that was normal     4. Anxiety is doing much better currently not on medication. 5.  Recent diagnosis of limited systemic scleroderma.  -She is followed by Dr. Farhana Shen. Current care per her gynecologist    Follow-up and Dispositions    · Return in about 3 months (around 11/17/2021).           Dominick Corral MD

## 2021-08-17 NOTE — PATIENT INSTRUCTIONS
Trial of anoro for 2 weeks daily let me know if it makes a difference   If it doesn't we will try a different sample  - breo    Continue to use albuterol as needed     Schedule CAT scan

## 2021-08-17 NOTE — PROGRESS NOTES
Chief Complaint   Patient presents with    Results     Discuss Pulmonary Function Test order by Rheumatologist Dr Chris Maxwell  /86 (BP 1 Location: Left arm, BP Patient Position: Sitting, BP Cuff Size: Adult)   Pulse 77   Temp 97.9 °F (36.6 °C) (Temporal)   Resp 16   Ht 5' (1.524 m)   Wt 164 lb 6.4 oz (74.6 kg)   SpO2 99%   BMI 32.11 kg/m²     1. Have you been to the ER, urgent care clinic since your last visit? Hospitalized since your last visit? No    2. Have you seen or consulted any other health care providers outside of the [address] [place] [address] since your last visit? Include any pap smears or colon screening.  Dr Roberts Gent

## 2021-08-24 RX ORDER — MONTELUKAST SODIUM 10 MG/1
TABLET ORAL
Qty: 30 TABLET | Refills: 1 | Status: SHIPPED | OUTPATIENT
Start: 2021-08-24 | End: 2022-04-25 | Stop reason: ALTCHOICE

## 2021-09-01 ENCOUNTER — HOSPITAL ENCOUNTER (OUTPATIENT)
Dept: CT IMAGING | Age: 61
Discharge: HOME | End: 2021-09-01
Attending: INTERNAL MEDICINE
Payer: COMMERCIAL

## 2021-09-01 DIAGNOSIS — Z87.891 HX OF SMOKING: ICD-10-CM

## 2021-09-01 DIAGNOSIS — R06.00 DYSPNEA, UNSPECIFIED TYPE: ICD-10-CM

## 2021-09-01 DIAGNOSIS — R94.2 ABNORMAL PFTS: ICD-10-CM

## 2021-09-01 PROCEDURE — 71250 CT THORAX DX C-: CPT

## 2021-09-06 RX ORDER — EPINEPHRINE 1 MG/ML
1 INJECTION, SOLUTION, CONCENTRATE INTRAVENOUS ONCE
Qty: 1 ML | Refills: 0
Start: 2021-09-06 | End: 2021-09-06

## 2021-09-06 NOTE — TELEPHONE ENCOUNTER
----- Message from Jackelyn Woods sent at 2021 11:52 AM EDT -----  Regarding: Prescription Question  Contact: [phone]  Hi: I had my 1st vaccine done this morning (). While having it the pharmacist looked at my Epi-Pen which is out of date,  . Can you please send in a new prescription for one. Better to have it and not need it. Than to need it and not have it.  Thank you!!

## 2021-09-08 RX ORDER — EPINEPHRINE 1 MG/ML
1 INJECTION, SOLUTION, CONCENTRATE INTRAVENOUS ONCE
Qty: 1 ML | Refills: 0 | Status: SHIPPED | OUTPATIENT
Start: 2021-09-08 | End: 2021-09-08

## 2021-09-08 NOTE — TELEPHONE ENCOUNTER
----- Message from Cami Woods sent at 2021  6:22 PM EDT -----  Regarding: Prescription Question  Contact: [phone]  I requested that my Epi-Pen be refilled because the one I have has . I got a message back that it was approved but if no pharmacy was listed below (which it wasn't) to click here. Which brought me back to another request. Please send the Epi-Pen to [address] [place] [address]. Thank you!

## 2021-10-06 ENCOUNTER — PATIENT MESSAGE (OUTPATIENT)
Dept: INTERNAL MEDICINE CLINIC | Age: 61
End: 2021-10-06

## 2021-10-06 NOTE — LETTER
NOTIFICATION RETURN TO WORK / SCHOOL    10/7/2021 5:13 PM    Ms. [phone]      To Whom It May Concern:    Aurelia Silva is currently under the care of [facility]. Please excuse patient on 10/06/2021 from work     If there are questions or concerns please have the patient contact our office.         Sincerely,      Maria Eugenia Hernandez MD

## 2021-10-07 NOTE — TELEPHONE ENCOUNTER
Kelli, Generic 10/6/2021 2:12 PM EDT    Only today, I started feeling bad yesterday around 10a but was already at work so I stuck it out. Thank you!

## 2021-10-15 ENCOUNTER — VIRTUAL VISIT (OUTPATIENT)
Dept: INTERNAL MEDICINE CLINIC | Age: 61
End: 2021-10-15
Payer: COMMERCIAL

## 2021-10-15 DIAGNOSIS — R51.9 CHRONIC NONINTRACTABLE HEADACHE, UNSPECIFIED HEADACHE TYPE: Primary | ICD-10-CM

## 2021-10-15 DIAGNOSIS — G89.29 CHRONIC NONINTRACTABLE HEADACHE, UNSPECIFIED HEADACHE TYPE: Primary | ICD-10-CM

## 2021-10-15 PROCEDURE — 99213 OFFICE O/P EST LOW 20 MIN: CPT | Performed by: INTERNAL MEDICINE

## 2021-10-15 RX ORDER — ACETAMINOPHEN 325 MG/1
TABLET ORAL
COMMUNITY
End: 2022-06-23

## 2021-10-15 NOTE — PROGRESS NOTES
CC: Headache (caused nausea), Generalized Body Aches, Nausea, Neck Pain (pt states her neck feels like she cannot keep her head up ), and Other (symptoms have lasted two weeks )      HPI:    Acute appointment   On the 2th of October 2nd shot and had severe headache. Took tylenol improved  On October 5th had severe headache \" band around my head and had severe nausea\"  Stayed in bed all day 6th and 7th   Bagley ok as long as she stayed still  Moving caused nausea, and equilibrium issues  Improved for 2 days and then had recurrence symptoms  October 11th pushed through and went to work, and having milder symptoms but still present   Neck feels stiff and odd       Anxiety: patient was doing well on buspar and successfully tapered off of xanax. Unfortunately patient developed side effects to Buspar medication stopped. She is doing well from anxiety standpoint. She did not tolerate SSRI     Positive SALVADOR and centromere B Ab/positive Raynaud's-noted some skin changes. Patient saw Dr. Deny Sexton. She was very pleased with her visit. Normal echocardiogram without evidence of pulmonary HTN   She has an EGD on 6/22/2021 which showed GAVE. PFT on 4/02/2021 did not show restrictive disease. Echocardiogram on 4/02/2021 did not show pulmonary hypertension. She continues to have dyspnea on exertion, such as waking dog or going up the stairs. Her echocardiogram did not show pulmonary artery hypertension. She had a recent CXR on 3/22/2021 which showed engorged pulmonary vasculature but no interstitial lung disease. Bernie Dexter Her PFTs showed   1. Very mild airflow obstruction. 2.  No restrictive lung disease. 3.  Air trapping is present. 4.  Normal DLCO. 5.  Normal flow-volume loop. CT chest 09/01   Minimal linear scarring at the lung bases. Several years ago had several allergies and started on singulair and resolved symptoms.      Singulair has not made any improvement ( told in the past she has allergy induced asthma) therefore she stopped Singulair    Smoked for very short times in her 20s  less then 10 pack years  Normal stress test in 2018       Up to date mammogram at [place] woman in August      This is an established visit conducted via telemedicine with video. The patient has been instructed that this meets HIPAA criteria and acknowledges and agrees to this method of visitation. Pursuant to the emergency declaration under the [facility], [facility] authority and the [employer] Act, this Virtual Visit was conducted, with patient's consent, to reduce the patient's risk of exposure to COVID-19 and provide continuity of care for an established patient. Services were provided through a video synchronous discussion virtually to substitute for in-person clinic visit. ROS:  Constitutional: negative for fevers, chills, anorexia and weight loss  10 systems reviewed and negative other then HPI     Past Medical History:   Diagnosis Date    Anxiety     Asthma 2008    allergy induced in [id number] [facility] ([address]     basal cell 20 years ago upper abdomen    Foot fracture     Gall stones     Hyperlipidemia     Hypothyroidism     Interstitial cystitis     [facility] Heather Herring at [facility] Interstitial cystitis     Kidney stones     Menopausal disorder     Other ill-defined conditions(799.89)     kidney stones in pass    PONV (postoperative nausea and vomiting)     severe nausea 1x in the past    Psychiatric disorder 11/10    anxiety attack    Thyroid disease        Current Outpatient Medications on File Prior to Visit   Medication Sig Dispense Refill    acetaminophen (TylenoL) 325 mg tablet Take  by mouth every four (4) hours as needed for Pain.  Omeprazole delayed release (PRILOSEC D/R) 20 mg tablet Take 20 mg by mouth two (2) times a day.       albuterol (PROVENTIL HFA, VENTOLIN HFA, PROAIR HFA) 90 mcg/actuation inhaler Take 1 Puff by inhalation every six (6) hours as needed for Wheezing. 1 Inhaler 2    [place] Thyroid 90 mg tablet TAKE 1 TABLET BY MOUTH  DAILY 90 Tab 3    cyanocobalamin (VITAMIN B-12) 1,000 mcg tablet Take 5,000 mcg by mouth daily.  estradiol-norethindrone (COMBIPATCH) 0.05-0.25 mg/24 hr 1 Patch by TransDERmal route two (2) times a week.  montelukast (SINGULAIR) 10 mg tablet TAKE 1 TABLET BY MOUTH EVERY DAY (Patient not taking: Reported on 10/15/2021) 30 Tablet 1    famotidine (PEPCID) 10 mg tablet Take 10 mg by mouth nightly. (Patient not taking: Reported on 10/15/2021)      [DISCONTINUED] [place] Thyroid 90 mg tablet TAKE 1 TABLET BY MOUTH  DAILY 90 Tab 0     No current facility-administered medications on file prior to visit.        Past Surgical History:   Procedure Laterality Date    HX ADENOIDECTOMY      HX CHOLECYSTECTOMY      HX DILATION AND CURETTAGE      miscarriage x1    HX ENDOSCOPY      HX GI  2010    cholecystectomy    HX GYN      laparoscopy for fertility    HX OTHER SURGICAL      hemithyroidectomy right, gall bladder removal    HX TONSILLECTOMY      GA BREAST SURGERY PROCEDURE UNLISTED      left breast lumpectomy       Family History   Problem Relation Age of Onset    Cancer Mother     Heart Disease Father     Heart Disease Brother      Reviewed and no changes     Social History     Socioeconomic History    Marital status:      Spouse name: Not on file    Number of children: Not on file    Years of education: Not on file    Highest education level: Not on file   Occupational History    Not on file   Tobacco Use    Smoking status: Former Smoker     Packs/day: 1.00     Years: 12.00     Pack years: 12.00     Types: Cigarettes     Quit date: 1990     Years since quittin.6    Smokeless tobacco: Never Used   Vaping Use    Vaping Use: Never used   Substance and Sexual Activity    Alcohol use: Not Currently     Comment: rarely    Drug use: Never    Sexual activity: Yes   Other Topics Concern    Not on file   Social History Narrative    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      Social Determinants of Health     Financial Resource Strain:     Difficulty of Paying Living Expenses:    Food Insecurity:     Worried About Running Out of Food in the Last Year:     [address] in the Last Year:    Transportation Needs:     Lack of Transportation (Medical):  Lack of Transportation (Non-Medical):    Physical Activity:     Days of Exercise per Week:     Minutes of Exercise per Session:    Stress:     Feeling of Stress :    Social Connections:     Frequency of Communication with Friends and Family:     Frequency of Social Gatherings with Friends and Family:     Attends Restoration[employer] Services:     Active Member of Clubs or Organizations:     Attends Club or Organization Meetings:     Marital Status:    Intimate Partner Violence:     Fear of Current or Ex-Partner:     Emotionally Abused:     Physically Abused:     Sexually Abused:             Visit Vitals  [facility] 12/23/2011       Physical Examination:   Gen: well appearing female  HEENT: normal conjunctiva, no audible congestion, patient does not see oral erythema, has MMM  Neck: patient does not feel enlarged or tender LAD or masses  Resp: normal respiratory effort, no audible wheezing. CV: patient does not feel palpitations or heart irregularity  Abd: patient does not feel abdominal tenderness or mass, patient does not notice distension  Extrem: patient does not see swelling in ankles or joints.    Neuro: Alert and oriented, able to answer questions without difficulty, able to move all extremities and walk normally          Lab Results   Component Value Date/Time    WBC 8.2 07/29/2021 07:58 AM    HGB 13.5 07/29/2021 07:58 AM    HCT 40.3 07/29/2021 07:58 AM    PLATELET 702 13/56/5916 07:58 AM    MCV 89 07/29/2021 07:58 AM     Lab Results   Component Value Date/Time    Sodium 138 03/22/2021 07:34 AM Potassium 3.3 (L) 03/22/2021 07:34 AM    Chloride 107 03/22/2021 07:34 AM    CO2 24 03/22/2021 07:34 AM    Anion gap 7 03/22/2021 07:34 AM    Glucose 93 03/22/2021 07:34 AM    BUN 13 03/22/2021 07:34 AM    Creatinine 0.69 03/22/2021 07:34 AM    BUN/Creatinine ratio 19 03/22/2021 07:34 AM    GFR est AA >60 03/22/2021 07:34 AM    GFR est non-AA >60 03/22/2021 07:34 AM    Calcium 8.3 (L) 03/22/2021 07:34 AM     Lab Results   Component Value Date/Time    Cholesterol, total 182 12/31/2019 08:55 AM    HDL Cholesterol 58 12/31/2019 08:55 AM    LDL, calculated 113 (H) 12/31/2019 08:55 AM    VLDL, calculated 11 12/31/2019 08:55 AM    Triglyceride 57 12/31/2019 08:55 AM     Lab Results   Component Value Date/Time    TSH 1.800 02/26/2021 04:39 PM     No results found for: PSA, Zamz[employer] Pickup, [id number], [id number]  Lab Results   Component Value Date/Time    Hemoglobin A1c 5.1 06/10/2019 02:47 PM     Lab Results   Component Value Date/Time    VITAMIN D, 25-HYDROXY 46.0 12/31/2019 08:55 AM       Lab Results   Component Value Date/Time    ALT (SGPT) 112 (H) 03/22/2021 07:34 AM    Alk. phosphatase 68 03/22/2021 07:34 AM    Bilirubin, total 0.6 03/22/2021 07:34 AM           Assessment/Plan:    1. Chronic nonintractable headache, unspecified headache type  Since second shot of covid vaccine, but improving. Vaccine was 13 day ago. Discussed if symptoms persist will image the brain with MRI. Patient to let me know know in 2 weeks  She has nausea which is also improving  No focal symptoms of vision changes or weakness  Advised to take tylenol and increase water intake              Vicki White MD    This is an established visit conducted via real time video and audio telemedicine. The patient has been instructed that this meets HIPAA criteria and acknowledges and agrees to this method of visitation.

## 2021-10-21 ENCOUNTER — PATIENT MESSAGE (OUTPATIENT)
Dept: INTERNAL MEDICINE CLINIC | Age: 61
End: 2021-10-21

## 2021-10-21 ENCOUNTER — OFFICE VISIT (OUTPATIENT)
Dept: RHEUMATOLOGY | Age: 61
End: 2021-10-21
Payer: COMMERCIAL

## 2021-10-21 VITALS
BODY MASS INDEX: 32.03 KG/M2 | TEMPERATURE: 98.6 F | DIASTOLIC BLOOD PRESSURE: 67 MMHG | RESPIRATION RATE: 18 BRPM | WEIGHT: 164 LBS | SYSTOLIC BLOOD PRESSURE: 139 MMHG | HEART RATE: 73 BPM

## 2021-10-21 DIAGNOSIS — G44.011 INTRACTABLE EPISODIC CLUSTER HEADACHE: Primary | ICD-10-CM

## 2021-10-21 DIAGNOSIS — M34.9 LIMITED SYSTEMIC SCLEROSIS (HCC): Primary | ICD-10-CM

## 2021-10-21 DIAGNOSIS — G44.201 ACUTE INTRACTABLE TENSION-TYPE HEADACHE: ICD-10-CM

## 2021-10-21 PROCEDURE — 99215 OFFICE O/P EST HI 40 MIN: CPT | Performed by: INTERNAL MEDICINE

## 2021-10-21 RX ORDER — LORAZEPAM 0.5 MG/1
0.5 TABLET ORAL
Qty: 2 TABLET | Refills: 0 | Status: SHIPPED | OUTPATIENT
Start: 2021-10-21 | End: 2021-10-21

## 2021-10-21 NOTE — PROGRESS NOTES
REASON FOR VISIT    This is a follow-up visit for Ms. Woods for     ICD-10-CM   1. Limited systemic sclerosis (HCC)  M34.9     Therapy History includes:  Current DMARD therapy include: None  Prior DMARD therapy include: None  Discontinued DMARDs because of inefficacy: None  Discontinued DMARDs because of side effects: None    INTERVAL HISTORY    On her last visit, I continued conservative management, ordered a CBC to evaluate for GAVEs, which was normal. She received her second COVID vaccine on 10/04/2021 and has suffered with headaches, associated with nausea and dizziness with it. If she bends over, she loses her balance. Her headaches are constant. Tylenol helps a little. No pain with neck movement but she has pain from the base of her head down her neck. She does not have headaches usually    Most recent toxicity monitoring by blood work was done on 7/29/2021and did not reveal any significant adverse effects. I reviewed the patient's interval medical history, surgical history, medications, and allergies. The patient has not had any interval hospital admissions, infections, or surgeries. SCLERODERMA REVIEW OF SYSTEMS     Cutaneous. She feels her skin is still puffy . She denies new areas of skin thickening. She denies pruritis. She remains on no disease-modifying therapy. Raynaud's phenomenon has not been active. She uses portable warmers and heating pad to use at work. . She has color changes and denies ulcerations. She is on no therapy. Gastrointestinal. She gastrointestinal symptoms have been stable. GERD is better controlled on current acid lowering therapy on omeprazole 20 mg daily. She denies dysphagia, heartburn or reflux. She avoids carbohydrate diets. She walks twice daily She denies having diarrhea or constipation. She has an EGD on 6/22/2021 which showed GAVE. She has irritable bowel syndrome with diarrhea and constipation. Cardiopulmonary. The patient's breathing has been stable.  She gets stable dyspnea with exertion, such as walking up stairs. She denies a persistent cough. She always feels globus and need to swallow. She denies lower extremity edema, lightheadedness but has orthopnea. PFT on 4/02/2021 did not show restrictive disease. Echocardiogram on 4/02/2021 did not show pulmonary hypertension. Musculoskeletal. She has been having joint issues. Muscle strength is weak in her arms due to pain. She denies having a neck imaging. Renal. She blood pressure has not been controlled but she has anxiety, her numbers are high and when she is calm it is in the one teens. Membranes. She denies dry eyes and denies dry mouth. REVIEW OF SYSTEMS    A comprehensive review of systems was performed and pertinent results are documented in the HPI, review of systems is otherwise non-contributory. PAST MEDICAL HISTORY    She has a past medical history of Anxiety, Asthma (2008), Cancer [facility]), Foot fracture, Gall stones, Hyperlipidemia, Hypothyroidism, Interstitial cystitis, Interstitial cystitis, Kidney stones, Menopausal disorder, Other ill-defined conditions(799.89), PONV (postoperative nausea and vomiting), Psychiatric disorder (11/10), and Thyroid disease. FAMILY HISTORY    Her family history includes Cancer in her mother; Heart Disease in her brother and father. SOCIAL HISTORY    She reports that she quit smoking about 31 years ago. Her smoking use included cigarettes. She has a 12.00 pack-year smoking history. She has never used smokeless tobacco. She reports previous alcohol use. She reports that she does not use drugs. MEDICATIONS     Current Outpatient Medications   Medication Sig    LORazepam (ATIVAN) 0.5 mg tablet Take 1 Tablet by mouth once as needed for Anxiety or Agitation (for MRI) for up to 1 dose. Max Daily Amount: 0.5 mg.    acetaminophen (TylenoL) 325 mg tablet Take  by mouth every four (4) hours as needed for Pain.     Omeprazole delayed release (PRILOSEC D/R) 20 mg tablet Take 20 mg by mouth two (2) times a day.  albuterol (PROVENTIL HFA, VENTOLIN HFA, PROAIR HFA) 90 mcg/actuation inhaler Take 1 Puff by inhalation every six (6) hours as needed for Wheezing.  [place] Thyroid 90 mg tablet TAKE 1 TABLET BY MOUTH  DAILY    cyanocobalamin (VITAMIN B-12) 1,000 mcg tablet Take 5,000 mcg by mouth daily.  estradiol-norethindrone (COMBIPATCH) 0.05-0.25 mg/24 hr 1 Patch by TransDERmal route two (2) times a week.  montelukast (SINGULAIR) 10 mg tablet TAKE 1 TABLET BY MOUTH EVERY DAY (Patient not taking: Reported on 10/15/2021)     No current facility-administered medications for this visit. ALLERGIES:     Allergies   Allergen Reactions    Adhesive Other (comments)     Eat skin off    Codeine Anaphylaxis    Codeine Sulfate Anaphylaxis    Darvon [Propoxyphene] Itching    Percocet [Oxycodone-Acetaminophen] Itching    Sulfa (Sulfonamide Antibiotics) Hives    Vicodin [Hydrocodone-Acetaminophen] Itching    Avocado Swelling    Demerol [Meperidine] Itching    Dilaudid [Hydromorphone] Itching    Lexapro [Escitalopram] Other (comments)     shake    Sulfamethoxazole-Trimethoprim Rash       PHYSICAL EXAMINATION    Visit Vitals  /67   Pulse 73   Temp 98.6 °F (37 °C)   Resp 18   Wt 164 lb (74.4 kg)   BMI 32.03 kg/m²     Body mass index is 32.03 kg/m². General: Patient is alert, oriented x 3, not in acute distress    HEENT:   Conjunctiva are not injected and appear moist, oral mucous membranes are moist, there are no ulcers present, there is no alopecia, neck is supple, there is no lymphadenopathy. There are are not scleroderma skin changes present in the face. Mauskopf is not present. Cardiovascular:  Heart is regular rate and rhythm, no murmurs. Chest:  Lungs reveal no crackles. Extremities:  Free of clubbing, cyanosis, edema, extremities well perfused. Neurological exam:  Muscle strength is full in upper and lower extremities.     Skin exam:    Sclerodactyly:    no  Scleroderma:    no (Modified Rodnan Score N/A)  Puffy Fingers:    yes  Raynaud's Phenomenon:  no  Telangiectasia:   Yes (right 3rd)  Digital Pits:    no  Digital Ulcers:    no  Rash:     no  Livedo Reticularis:   no  Periungual Erythema:   no  Abnormal Nailfold Capillaries: Yes (right 5th, left: 4th)  Calcium Cutis     no     Slightly hyperpigmented macule oblong in shape on midthoracic spine without erythema or induration (not morphea)      Musculoskeletal exam:  A comprehensive musculoskeletal exam was performed for all joints of each upper and lower extremity and assessed for swelling, tenderness and range of motion. Right heberden nodes  Bilateral trochanteric bursa tenderness  Bilateral iliotibial band tenderness    Synovitis:   no    Tendon Friction Rubs: no   Joint Effusion:   no       DATA REVIEW     Laboratory     Recent laboratory results were reviewed, summarized, and discussed with the patient. Scleroderma Studies    Pulmonary Functions Testing    PFT 4/02/2021: FVC of 2.94 (112%), FEV1 of 1.75 (91%), FEV1/FVC of 60% and a DLCO of 19.9 (98%), TLC 5.52 (135%), VC 2.94 (112%). Echocardiogram    Echocardiogram 4/02/2021: LV: Estimated LVEF is 55 - 60%. Normal cavity size, wall thickness, systolic function (ejection fraction normal) and diastolic function. Pulmonary arterial systolic pressure (PASP) is 33 mmHg. Pulmonary hypertension not suggested by Doppler findings    Imaging    Musculoskeletal Ultrasound    None    Radiographs    Chest 3/22/2021: Cardiomediastinal silhouette is normal. Pulmonary vasculature is engorged. No focal parenchymal opacities, effusions, or pneumothorax. Bony thorax is intact. CT Imaging    CT Chest without contrast 9/01/2021: CHEST WALL: No mass or axillary lymphadenopathy. THYROID: No nodule. MEDIASTINUM: No mass or lymphadenopathy. EDISON: No mass or lymphadenopathy. THORACIC AORTA: No aneurysm.  MAIN PULMONARY ARTERY: Normal in caliber. TRACHEA/BRONCHI: Patent. ESOPHAGUS: No wall thickening or dilatation. HEART: Normal in size. PLEURA: No effusion or pneumothorax. LUNGS: No nodule, minimal linear scarring at the lung bases. INCIDENTALLY IMAGED UPPER ABDOMEN: No significant abnormality in the incidentally imaged upper abdomen. BONES: No destructive bone lesion.     MR Imaging    None    DXA     None    GI Studies    EGD 6/29/2021: Normal duodenum. Irregular Z-line in the gastroesophageal junction. Normal mucosa in the middle third of the esophagus. Granularity in the stomach body. Streaky erythema in the antrum compatible with GAVE. PATHOLOGY    Biopsy, antrum 6/29/2021: Reactive gastropathy. No evidence of gastritis. Helicobacter stain is negative. Biopsy, stomach body 6/29/2021: Unremarkable oxyntic mucosa. No evidence of gastritis or gastropathy. The Helicobacter stain is negative. Biopsy, esophagus, middle third 6/29/2021: Unremarkable squamous mucosa. No evidence of intestinal metaplasia, reflux or eosinophilia. ASSESSMENT AND PLAN    1) Limited Systemic Scleroderma. (puffy fingers, telangiectasia, abnormal nailfold, Raynaud's phenomenon, GERD, GAVE, SALVADOR IFA RDL >1:1280 (centromere), anti-centromere >8.0)     Cutaneous. She has puffy fingers, without scleroderma, which is stable. Her Raynaud's has not been active. She has been keeping warm with avoidance of cold with hand warmers and heated blanket. She does not have calcinosis cutis. We discussed medical management with CCB if needed. I discussed possible digital ulcers. Primary treatment is to keep warm.     Gastrointestinal. She has a GERD and GAVE. She is on omeprazole 20 mg daily with improvement of her GERD. She is no longer taking famotidine 10 mg daily. She has had an EGD on 6/29/2021. She does not have anemia. Repeat CBC was normal.       Cardiopulmonary. She has stable dyspnea on exertion, such as going up the stairs. She may have deconditioning.  PFT showed reactive airway disease but no restrictive lung disease. Her echocardiogram did not show pulmonary artery hypertension. She had a recent CXR on 3/22/2021 which showed engorged pulmonary vasculature but no interstitial lung disease. CT chest on 9/01/2021 showed no gross pathology. I will repeat pulmonary function tests and echocardiogram to evaluate for interval deterioration. Musculoskeletal. She has osteoarthritis. She has trochanteric bursitis and IT band.     Renal. Her blood pressure was 139/67from white coat.     RECOMMENDATIONS:    - omeprazole 20 mg daily  - I ordered repeat PFT/echocardiogram I    2) Bilateral Trochanteric Bursitis, Iliotibial Band Syndrome and Rotator Cuff Arthropathy. I had referred her to physical therapy. 3) New-Onset Headaches s/p COVID vaccine. I ordered an MRI/MRV brain to rule out sinus vein thrombosis or other intracranial pathology. I prescribed Ativan as needed for imaging. The patient voiced understanding of the aforementioned assessment and plan. Summary of plan was provided in the After Visit Summary patient instructions. A total of 50 minutes was spent on this visit, reviewing interval notes, interval testing results, ordering tests, refilling medications, documenting the findings in the note, patient education, counseling, and coordination of care as described above. All questions asked and answered.     TODAY'S ORDERS    Orders Placed This Encounter    MRV BRAIN W WO CONT    MRI BRAIN W WO CONT    2D ECHO COMPLETE (PEDS/ADULT)    PFT COMPLETE    PFT DLCO    LORazepam (ATIVAN) 0.5 mg tablet     Future Appointments   Date Time Provider Dayne Мария   11/18/2021  8:45 AM Sushila Chiang MD [facility]   4/21/2022  2:20 PM Abimael Millard MD [facility] BS AMB     Karey López MD, [address] [facility]    Adult Rheumatology   Rheumatology Ultrasound Certified  [address]  [facility] [place], [address]   Phone [phone]  Fax [phone]

## 2021-10-21 NOTE — Clinical Note
Hi. I ordered an MRV to r/o sinus vein thrombosis due to headaches, which may be something with the COVID vaccines

## 2021-10-21 NOTE — LETTER
10/21/2021    Patient: Natalie Chaudhari   YOB: 1960   Date of Visit: 10/21/2021     Juan Carlos Gonzalez MD  [address] [facility] [address]  [facility]  Via In Basket    Dear Juan Carlos Gonzalez MD,      Thank you for referring Ms. Uli Reyes to [facility] for evaluation. My notes for this consultation are attached. If you have questions, please do not hesitate to call me. I look forward to following your patient along with you.       Sincerely,    Avery King MD

## 2021-10-25 ENCOUNTER — HOSPITAL ENCOUNTER (OUTPATIENT)
Dept: MRI IMAGING | Age: 61
Discharge: HOME | End: 2021-10-25
Attending: INTERNAL MEDICINE
Payer: COMMERCIAL

## 2021-10-25 DIAGNOSIS — G44.201 ACUTE INTRACTABLE TENSION-TYPE HEADACHE: ICD-10-CM

## 2021-10-25 PROCEDURE — 70544 MR ANGIOGRAPHY HEAD W/O DYE: CPT

## 2021-10-25 PROCEDURE — A9576 INJ PROHANCE MULTIPACK: HCPCS | Performed by: INTERNAL MEDICINE

## 2021-10-25 PROCEDURE — 70553 MRI BRAIN STEM W/O & W/DYE: CPT

## 2021-10-25 PROCEDURE — 74011250636 HC RX REV CODE- 250/636: Performed by: INTERNAL MEDICINE

## 2021-10-25 RX ADMIN — GADOTERIDOL 15 ML: 279.3 INJECTION, SOLUTION INTRAVENOUS at 15:10

## 2021-10-29 RX ORDER — METHYLPREDNISOLONE 4 MG/1
TABLET ORAL
Qty: 1 DOSE PACK | Refills: 0 | Status: SHIPPED | OUTPATIENT
Start: 2021-10-29 | End: 2022-01-25 | Stop reason: ALTCHOICE

## 2021-10-29 NOTE — TELEPHONE ENCOUNTER
Kelli, Generic 10/28/2021 5:13 PM EDT    It's been a long time since I've taken it but the thing I remember the most is having a hard time sleeping and being very irritable. If it might stop the headache it's worth trying. Thanks!

## 2021-11-08 ENCOUNTER — HOSPITAL ENCOUNTER (OUTPATIENT)
Dept: PREADMISSION TESTING | Age: 61
Discharge: HOME | End: 2021-11-08

## 2021-11-16 ENCOUNTER — PATIENT MESSAGE (OUTPATIENT)
Dept: INTERNAL MEDICINE CLINIC | Age: 61
End: 2021-11-16

## 2021-11-16 DIAGNOSIS — M54.2 NECK PAIN: Primary | ICD-10-CM

## 2021-11-16 DIAGNOSIS — G44.229 CHRONIC TENSION-TYPE HEADACHE, NOT INTRACTABLE: ICD-10-CM

## 2021-11-16 NOTE — TELEPHONE ENCOUNTER
Mendy Munoz 11/16/2021 7:33 AM EST      ----- Message -----  From: Jose Joshi  Sent: 11/16/2021 3:19 AM EST  To: [facility] Nurse Pool  Subject: Yunior Bullock: I took the Methylprednisolone you prescribed and it worked great for all of my joint pain but did nothing for my neck and head. I have been trying to ignore it but it's 3am and I can't sleep because of the pain. The longer I have this I now think it's not my head causing my neck to hurt it's my neck causing my head to hurt. I can hear a crack or grind sound in the back of my neck were my neck and head meet. As the day goes on the more it hurts. It's almost like my neck can't hold up my neck. I've tried heat and cold, Tylenol and Ibuprofen even bought a neck massager but nothing is working. What now? Because ignoring it is not working.

## 2021-11-23 ENCOUNTER — TELEPHONE (OUTPATIENT)
Dept: INTERNAL MEDICINE CLINIC | Age: 61
End: 2021-11-23

## 2021-11-23 RX ORDER — AMITRIPTYLINE HYDROCHLORIDE 10 MG/1
10 TABLET, FILM COATED ORAL
Qty: 30 TABLET | Refills: 1 | Status: SHIPPED | OUTPATIENT
Start: 2021-11-23 | End: 2022-01-25 | Stop reason: DRUGHIGH

## 2021-11-23 NOTE — TELEPHONE ENCOUNTER
----- Message from Dwain Behzad sent at 11/23/2021 11:38 AM EST -----  Subject: Appointment Request    Reason for Call: Routine Existing Condition Follow Up    QUESTIONS  Type of Appointment? Established Patient  Reason for appointment request? No appointments available during search  Additional Information for Provider? Patient was advice to schedule in   person appointment with Dr. Olya Lorenzo . There is no appointment for in person   during search. She is requesting to be seen in January in [place].   ---------------------------------------------------------------------------  --------------  [address]  What is the best way for the office to contact you? OK to leave message on   voicemail  Preferred Call Back Phone Number? [phone]  ---------------------------------------------------------------------------  --------------  SCRIPT ANSWERS  Relationship to Patient? Self  (Is the patient requesting to be seen urgently for their symptoms?)? No  Is this follow up request related to routine Diabetes Management? No  Are you having any new concerns about your existing condition? No  Have you been diagnosed with, awaiting test results for, or told that you   are suspected of having COVID-19 (Coronavirus)? (If patient has tested   negative or was tested as a requirement for work, school, or travel and   not based on symptoms, answer no)? No  Within the past two weeks have you developed any of the following symptoms   (answer no if symptoms have been present longer than 2 weeks or began   more than 2 weeks ago)? Fever or Chills, Cough, Shortness of breath or   difficulty breathing, Loss of taste or smell, Sore throat, Nasal   congestion, Sneezing or runny nose, Fatigue or generalized body aches   (answer no if pain is specific to a body part e.g. back pain), Diarrhea,   Headache? No  Have you had close contact with someone with COVID-19 in the last 14 days?    No  (Service Expert  click yes below to proceed with Allison Mccrary As Usual   Scheduling)?  Yes

## 2022-01-25 ENCOUNTER — PATIENT MESSAGE (OUTPATIENT)
Dept: INTERNAL MEDICINE CLINIC | Age: 62
End: 2022-01-25

## 2022-01-25 ENCOUNTER — VIRTUAL VISIT (OUTPATIENT)
Dept: INTERNAL MEDICINE CLINIC | Age: 62
End: 2022-01-25
Payer: COMMERCIAL

## 2022-01-25 DIAGNOSIS — G44.229 CHRONIC TENSION-TYPE HEADACHE, NOT INTRACTABLE: ICD-10-CM

## 2022-01-25 DIAGNOSIS — E89.0 POSTOPERATIVE HYPOTHYROIDISM: ICD-10-CM

## 2022-01-25 DIAGNOSIS — Z13.220 SCREENING FOR CHOLESTEROL LEVEL: ICD-10-CM

## 2022-01-25 DIAGNOSIS — M34.9 SCLERODERMA (HCC): ICD-10-CM

## 2022-01-25 DIAGNOSIS — M50.10 CERVICAL RADICULOPATHY DUE TO INTERVERTEBRAL DISC DISORDER: Primary | ICD-10-CM

## 2022-01-25 PROCEDURE — 99214 OFFICE O/P EST MOD 30 MIN: CPT | Performed by: INTERNAL MEDICINE

## 2022-01-25 RX ORDER — CHOLECALCIFEROL TAB 125 MCG (5000 UNIT) 125 MCG
5000 TAB ORAL DAILY
COMMUNITY
End: 2022-06-23

## 2022-01-25 RX ORDER — AMITRIPTYLINE HYDROCHLORIDE 10 MG/1
20 TABLET, FILM COATED ORAL
Qty: 60 TABLET | Refills: 5 | Status: SHIPPED | OUTPATIENT
Start: 2022-01-25 | End: 2022-04-25

## 2022-01-25 RX ORDER — ZINC GLUCONATE 10 MG
LOZENGE ORAL
COMMUNITY
End: 2022-04-25 | Stop reason: ALTCHOICE

## 2022-01-25 NOTE — TELEPHONE ENCOUNTER
I left the pt a message re: I faxed her lab order to [address] [place] [address] [address]  will mail her PT referral and lab order to her. Also I sent her a [email or web address] message.    Signed By: Estella Montemayor LPN     January 25, 0965

## 2022-01-25 NOTE — PROGRESS NOTES
CC: Follow-up  HA     HPI:  Headache - patient was seen Early October for HA   Onset after Moderna vaccination   Work up included MRI brain which was normal followed by cervical x ray which showed degenerative disc disease C5- C6  Noted mild improvement with elavil  HA resolved  Neck pain is present stil land wonders if neck is cause of HA  When she ran out of elavil had back of head of pain   She has an appointment in April 21 st 2021           Anxiety:since has been off of xanax anxiety is much better. Anxiety from time to time. Positive SALVADOR and centromere B Ab/positive Raynaud's-noted some skin changes. Patient saw Dr. Warden Harkins. Normal echocardiogram without evidence of pulmonary HTN   She has an EGD on 6/22/2021 which showed GAVE. PFT on 4/02/2021 did not show restrictive disease. Echocardiogram on 4/02/2021 did not show pulmonary hypertension. She continues to have bouts of mild dyspnea which patient attributes to weight     Her PFTs showed   1. Very mild airflow obstruction. 2.  No restrictive lung disease. 3.  Air trapping is present. 4.  Normal DLCO. 5.  Normal flow-volume loop. CT chest 09/01   Minimal linear scarring at the lung bases. Several years ago had several allergies and started on singulair and resolved symptoms. Singulair has not made any improvement ( told in the past she has allergy induced asthma) therefore she stopped Singulair    Smoked for very short times in her 20s  less then 10 pack years  Normal stress test in 2018       Up to date mammogram at [place] woman     Current weight is 167 lbs    This is an established visit conducted via telemedicine with video. The patient has been instructed that this meets HIPAA criteria and acknowledges and agrees to this method of visitation.      Pursuant to the emergency declaration under the [facility] authority and the [employer] and [employer] [facility] Act, this Virtual Visit was conducted, with patient's consent, to reduce the patient's risk of exposure to COVID-19 and provide continuity of care for an established patient. Services were provided through a video synchronous discussion virtually to substitute for in-person clinic visit. ROS:  Constitutional: negative for fevers, chills, anorexia and weight loss  10 systems reviewed and negative other then HPI     Past Medical History:   Diagnosis Date    Anxiety     Asthma 2008    allergy induced in [address]     basal cell 20 years ago upper abdomen    Foot fracture     Gall stones     Hyperlipidemia     Hypothyroidism     Interstitial cystitis     [facility] Ryan Elias [facility] Interstitial cystitis     Kidney stones     Menopausal disorder     Other ill-defined conditions(699.77)     kidney stones in [place]    PONV (postoperative nausea and vomiting)     severe nausea 1x in the past    Psychiatric disorder 11/10    anxiety attack    Thyroid disease        Current Outpatient Medications on File Prior to Visit   Medication Sig Dispense Refill    magnesium 250 mg tab Take  by mouth.  zinc sulfate (ZINC-15 PO) Take  by mouth.  cholecalciferol (Vitamin D3) (5000 Units/125 mcg) tab tablet Take 5,000 Units by mouth daily.  acetaminophen (TylenoL) 325 mg tablet Take  by mouth every four (4) hours as needed for Pain.  Omeprazole delayed release (PRILOSEC D/R) 20 mg tablet Take 20 mg by mouth two (2) times a day.  albuterol (PROVENTIL HFA, VENTOLIN HFA, PROAIR HFA) 90 mcg/actuation inhaler Take 1 Puff by inhalation every six (6) hours as needed for Wheezing. 1 Inhaler 2    [place] Thyroid 90 mg tablet TAKE 1 TABLET BY MOUTH  DAILY 90 Tab 3    cyanocobalamin (VITAMIN B-12) 1,000 mcg tablet Take 5,000 mcg by mouth daily.  estradiol-norethindrone (COMBIPATCH) 0.05-0.25 mg/24 hr 1 Patch by TransDERmal route two (2) times a week.       montelukast (SINGULAIR) 10 mg tablet TAKE 1 TABLET BY MOUTH EVERY DAY (Patient not taking: Reported on 10/15/2021) 30 Tablet 1     No current facility-administered medications on file prior to visit. Past Surgical History:   Procedure Laterality Date    HX ADENOIDECTOMY      HX CHOLECYSTECTOMY      HX DILATION AND CURETTAGE      miscarriage x1    HX ENDOSCOPY      HX GI  2010    cholecystectomy    HX GYN      laparoscopy for fertility    HX OTHER SURGICAL  2006    hemithyroidectomy right, gall bladder removal    HX TONSILLECTOMY      SD BREAST SURGERY PROCEDURE UNLISTED      left breast lumpectomy       Family History   Problem Relation Age of Onset    Cancer Mother     Heart Disease Father     Heart Disease Brother      Reviewed and no changes     Social History     Socioeconomic History    Marital status:      Spouse name: Not on file    Number of children: Not on file    Years of education: Not on file    Highest education level: Not on file   Occupational History    Not on file   Tobacco Use    Smoking status: Former Smoker     Packs/day: 1.00     Years: 12.00     Pack years: 12.00     Types: Cigarettes     Quit date: 1990     Years since quittin.9    Smokeless tobacco: Never Used   Vaping Use    Vaping Use: Never used   Substance and Sexual Activity    Alcohol use: Not Currently     Comment: rarely    Drug use: Never    Sexual activity: Yes   Other Topics Concern    Not on file   Social History Narrative    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      Social Determinants of Health     Financial Resource Strain:     Difficulty of Paying Living Expenses: Not on file   Food Insecurity:     Worried About Running Out of Food in the Last Year: Not on file    [place] of Food in the Last Year: Not on file   Transportation Needs:     Lack of Transportation (Medical): Not on file    Lack of Transportation (Non-Medical):  Not on file   Physical Activity:     Days of Exercise per Week: Not on file    Minutes of Exercise per Session: Not on file   Stress:     Feeling of Stress : Not on file   Social Connections:     Frequency of Communication with Friends and Family: Not on file    Frequency of Social Gatherings with Friends and Family: Not on file    Attends Restorationist Services: Not on file    Active Member of Clubs or Organizations: Not on file    Attends Club or Organization Meetings: Not on file    Marital Status: Not on file   Intimate Partner Violence:     Fear of Current or Ex-Partner: Not on file    Emotionally Abused: Not on file    Physically Abused: Not on file    Sexually Abused: Not on file   Housing Stability:     Unable to Pay for Housing in the Last Year: Not on file    Number of [place] in the Last Year: Not on file    Unstable Housing in the Last Year: Not on file            Visit Vitals  [facility] 12/23/2011       Physical Examination:   Gen: well appearing female  HEENT: normal conjunctiva, no audible congestion, patient does not see oral erythema, has MMM  Neck: patient does not feel enlarged or tender LAD or masses  Resp: normal respiratory effort, no audible wheezing. CV: patient does not feel palpitations or heart irregularity  Abd: patient does not feel abdominal tenderness or mass, patient does not notice distension  Extrem: patient does not see swelling in ankles or joints.    Neuro: Alert and oriented, able to answer questions without difficulty, able to move all extremities and walk normally          Lab Results   Component Value Date/Time    WBC 8.2 07/29/2021 07:58 AM    HGB 13.5 07/29/2021 07:58 AM    HCT 40.3 07/29/2021 07:58 AM    PLATELET 830 68/52/6408 07:58 AM    MCV 89 07/29/2021 07:58 AM     Lab Results   Component Value Date/Time    Sodium 138 03/22/2021 07:34 AM    Potassium 3.3 (L) 03/22/2021 07:34 AM    Chloride 107 03/22/2021 07:34 AM    CO2 24 03/22/2021 07:34 AM    Anion gap 7 03/22/2021 07:34 AM    Glucose 93 03/22/2021 07:34 AM BUN 13 03/22/2021 07:34 AM    Creatinine 0.69 03/22/2021 07:34 AM    BUN/Creatinine ratio 19 03/22/2021 07:34 AM    GFR est AA >60 03/22/2021 07:34 AM    GFR est non-AA >60 03/22/2021 07:34 AM    Calcium 8.3 (L) 03/22/2021 07:34 AM     Lab Results   Component Value Date/Time    Cholesterol, total 182 12/31/2019 08:55 AM    HDL Cholesterol 58 12/31/2019 08:55 AM    LDL, calculated 113 (H) 12/31/2019 08:55 AM    VLDL, calculated 11 12/31/2019 08:55 AM    Triglyceride 57 12/31/2019 08:55 AM     Lab Results   Component Value Date/Time    TSH 1.800 02/26/2021 04:39 PM     No results found for: PSA, Winnie Howard, [id number], [id number]  Lab Results   Component Value Date/Time    Hemoglobin A1c 5.1 06/10/2019 02:47 PM     Lab Results   Component Value Date/Time    VITAMIN D, 25-HYDROXY 46.0 12/31/2019 08:55 AM       Lab Results   Component Value Date/Time    ALT (SGPT) 112 (H) 03/22/2021 07:34 AM    Alk. phosphatase 68 03/22/2021 07:34 AM    Bilirubin, total 0.6 03/22/2021 07:34 AM           Assessment/Plan:    1. Cervical radiculopathy due to intervertebral disc disorder: still having pain specially at night increase elavil. Agrees with referral to PT and if no improvement then MRI of neck  - amitriptyline (ELAVIL) 10 mg tablet; Take 2 Tablets by mouth nightly. Dispense: 60 Tablet; Refill: 5    2. Chronic tension-type headache, not intractable  Headaches are better continue elavil    3. Postoperative hypothyroidism  - TSH 3RD GENERATION  - T4, FREE  - METABOLIC PANEL, COMPREHENSIVE  - LIPID PANEL    4. Scleroderma (HCC) Limites Systemic Scleroderma  has Raynaud's GERD, GAVE and SALVADOR   Not on therapy   [facility] is rheumatologist  PFT showed reactive airway disease but no restrictive lung disease. Her echocardiogram did not show pulmonary artery hypertension. She had a recent CXR on 3/22/2021 which showed engorged pulmonary vasculature but no interstitial lung disease.  CT chest on 9/01/2021 showed no gross pathology. 5. Screening for cholesterol level  - LIPID PANEL          Follow-up and Dispositions    · Return in about 2 months (around 3/25/2022). Matt Dawkins MD    This is an established visit conducted via real time video and audio telemedicine. The patient has been instructed that this meets HIPAA criteria and acknowledges and agrees to this method of visitation.

## 2022-01-25 NOTE — PROGRESS NOTES
Boogie Prabhakar (: 1960) is a 62 y.o. female, established patient, here for evaluation of the following chief complaint(s):   Follow-up       ASSESSMENT/PLAN:  Below is the assessment and plan developed based on review of pertinent history, labs, studies, and medications. No follow-ups on file. SUBJECTIVE/OBJECTIVE:      Boogie Prabhakar, was evaluated through a synchronous (real-time) audio-video encounter. The patient (or guardian if applicable) is aware that this is a billable service, which includes applicable co-pays. Verbal consent to proceed has been obtained. The visit was conducted pursuant to the emergency declaration under the [facility] [address] waiver authority and the [employer] and [employer] Act. Patient identification was verified, and a caregiver was present when appropriate. The patient was located at home in a state where the provider was licensed to provide care. An electronic signature was used to authenticate this note.   -- Elsy Ly LPN

## 2022-02-11 LAB
ALBUMIN SERPL-MCNC: 4.2 G/DL (ref 3.8–4.9)
ALBUMIN/GLOB SERPL: 1.9 {RATIO} (ref 1.2–2.2)
ALP SERPL-CCNC: 61 IU/L (ref 44–121)
ALT SERPL-CCNC: 19 IU/L (ref 0–32)
AST SERPL-CCNC: 16 IU/L (ref 0–40)
BILIRUB SERPL-MCNC: 0.3 MG/DL (ref 0–1.2)
BUN SERPL-MCNC: 15 MG/DL (ref 8–27)
BUN/CREAT SERPL: 21 (ref 12–28)
CALCIUM SERPL-MCNC: 9.2 MG/DL (ref 8.7–10.3)
CHLORIDE SERPL-SCNC: 102 MMOL/L (ref 96–106)
CHOLEST SERPL-MCNC: 196 MG/DL (ref 100–199)
CO2 SERPL-SCNC: 23 MMOL/L (ref 20–29)
CREAT SERPL-MCNC: 0.7 MG/DL (ref 0.57–1)
GLOBULIN SER CALC-MCNC: 2.2 G/DL (ref 1.5–4.5)
GLUCOSE SERPL-MCNC: 88 MG/DL (ref 65–99)
HDLC SERPL-MCNC: 59 MG/DL
LDLC SERPL CALC-MCNC: 123 MG/DL (ref 0–99)
POTASSIUM SERPL-SCNC: 4 MMOL/L (ref 3.5–5.2)
PROT SERPL-MCNC: 6.4 G/DL (ref 6–8.5)
SODIUM SERPL-SCNC: 138 MMOL/L (ref 134–144)
T4 FREE SERPL-MCNC: 0.94 NG/DL (ref 0.82–1.77)
TRIGL SERPL-MCNC: 75 MG/DL (ref 0–149)
TSH SERPL DL<=0.005 MIU/L-ACNC: 1.8 UIU/ML (ref 0.45–4.5)
VLDLC SERPL CALC-MCNC: 14 MG/DL (ref 5–40)

## 2022-02-11 NOTE — PROGRESS NOTES
Las look great  Normal thyroid function   Normal kidney and liver function  Cholesterol at goal for age

## 2022-02-24 RX ORDER — AMLODIPINE BESYLATE 5 MG/1
5 TABLET ORAL DAILY
Qty: 30 TABLET | Refills: 3 | Status: SHIPPED | OUTPATIENT
Start: 2022-02-24 | End: 2022-02-24 | Stop reason: SDUPTHER

## 2022-02-25 RX ORDER — AMLODIPINE BESYLATE 5 MG/1
5 TABLET ORAL DAILY
Qty: 30 TABLET | Refills: 3 | Status: SHIPPED | OUTPATIENT
Start: 2022-02-25 | End: 2022-03-28 | Stop reason: ALTCHOICE

## 2022-03-07 ENCOUNTER — HOSPITAL ENCOUNTER (OUTPATIENT)
Dept: PHYSICAL THERAPY | Age: 62
Discharge: HOME | End: 2022-03-07

## 2022-03-07 PROCEDURE — 97162 PT EVAL MOD COMPLEX 30 MIN: CPT | Performed by: PHYSICAL THERAPIST

## 2022-03-07 PROCEDURE — 97110 THERAPEUTIC EXERCISES: CPT | Performed by: PHYSICAL THERAPIST

## 2022-03-07 NOTE — PROGRESS NOTES
PT INITIAL EVALUATION NOTE 2-15    Patient Name: Boogie Prabhakar  YIBL:[id number]  : 1960  [x]  Patient  Verified  Payor: Gilford Baars / Plan:  [address] / Product Type: HMO /    In time: 3:05pm  Out time: 4:05pm  Total Treatment Time (min): 60  Visit #: 1     Treatment Area: Cervical disc disorder with radiculopathy, unspecified cervical region [M50.10]    SUBJECTIVE  Pain Level (0-10 scale): 8 (0-10/10)  Any medication changes, allergies to medications, adverse drug reactions, diagnosis change, or new procedure performed?: [] No    [x] Yes (see summary sheet for update)  Subjective: The patient reports that she found out she has scleroderma about a year or more ago, which affects all her joints. In 2021 she got her second shot of COVID and two days later she had a headache and neck pain, thought it was from that. They did an MRI of her brain to make sure she didn't have a blood clot from the shot, which was negative. By 10am she has difficulty holding her head up at work (she's worked in an office for 40 years). Sleeping on the left is the worst. It hurts from the neck up to the back of her head. When it first started, she had a shooting pain down the left arm (nerve type of pain), but now not as frequent (last time was a few weeks ago). Cold felt better than heat. Lying down makes it feel better than sitting up. Medicine that she started in 2021 helped at night (Amitriptyline), then she tried 2 pills in February but can't due to restless leg syndrome. X-rays on 21 show: \"No acute fracture. Degenerative disc disease, most pronounced at C5-C6. \"    OBJECTIVE/EXAMINATION  Posture:  Scapular protraction bilaterally  Other Observations:  n/a  Palpation: tender to suboccipitals        Cervical AROM:        R  L    Flexion    45, p! Extension   30, p!     Side Bending   Limited bilaterally     Rotation   28, p!  26    A/PROM Shoulder: Limited bilaterally (130 AROM flexion bilaterally)    Joint Mobility Assessment: Hypomobile cervical and thoracic spine    Flexibility: tight pecs and bilateral UTs    UPPER QUARTER     MUSCLE STRENGTH  KEY        R  L  0 - No Contraction   Flexion   4-  4-  1 - Trace    Extension (elbow) 4  4  2 - Poor    Abduction  3+  3+  3 - Fair     IR   4  4  4 - Good    ER   3-  3-  5 - Normal       Neurological: Reflexes / Sensations: nt  Special Tests: n/a    25 min Therapeutic Exercise:  [x] See flow sheet :   Rationale: increase ROM, increase strength and improve coordination to improve the patients ability to perform daily activities.            With   [x] TE   [] TA   [] Neuro   [] SC   [] other: Patient Education: [x] Review HEP    [x] Progressed/Changed HEP based on:   [x] positioning   [x] body mechanics   [] transfers   [] heat/ice application    [] other:        Other Objective/Functional Measures: FOTO Functional Measure: 46/100                  Pain Level (0-10 scale) post treatment: 7      ASSESSMENT:      [x]  See Plan of [address], PT 3/7/2022

## 2022-03-07 NOTE — PROGRESS NOTES
[address] Physical Therapy  [address] ([address]), [address] [employer] Jumana Kincaid  Phone: [phone] Fax: [phone]    Plan of Care/Statement of Necessity for Physical Therapy Services  2-15    Patient name: Carole Carter  : 1960  Provider#: [id number]  Referral source: Travis Abraham MD      Medical/Treatment Diagnosis: Cervical disc disorder with radiculopathy, unspecified cervical region [M50.10]     Prior Hospitalization: see medical history     Comorbidities: allergies, anxiety, arthritis, back pain, BMI over 30, depression, GI disease, headaches, kidney/bladder/urination problems, prior surgery  Prior Level of Function: 20min of exercise 1-2x/wk   Medications: Verified on Patient Summary List    Start of Care: 3/7/22      Onset Date: 2021       The [address] [place] [address] and following information is based on the information from the initial evaluation. Assessment/ key information: The patient presents with a chief complaint of cervical and suboccipital pain that is consistent with recent x-ray findings of cervical DDD (particularly at C5/C6). Her hypomobile cervical and thoracic spine, poor posture, and significantly decreased shoulder and periscapular strength exacerbate her symptoms. The patient will benefit from guided therapeutic interventions such as therex for strengthening and neuromuscular re-education, manual techniques for joint mobility and soft tissue extensibility, and modalities for pain management in order to improve functional mobility with daily activities. Evaluation Complexity History MEDIUM  Complexity : 1-2 comorbidities / personal factors will impact the outcome/ POC ; Examination MEDIUM Complexity : 3 Standardized tests and measures addressing body structure, function, activity limitation and / or participation in recreation  ;Presentation MEDIUM Complexity : Evolving with changing characteristics  ; Clinical Decision Making MEDIUM Complexity : FOTO score of 26-74  Overall Complexity Rating: MEDIUM    Problem List: pain affecting function, decrease ROM, decrease strength, edema affecting function, decrease ADL/ functional abilitiies, decrease activity tolerance, decrease flexibility/ joint mobility and decrease transfer abilities   Treatment Plan may include any combination of the following: Therapeutic exercise, Therapeutic activities, Neuromuscular re-education, Physical agent/modality, Manual therapy, Patient education, Self Care training, Functional mobility training and Home safety training  Patient / Family readiness to learn indicated by: asking questions, trying to perform skills and interest  Persons(s) to be included in education: patient (P)  Barriers to Learning/Limitations: None  Patient Goal (s): decrease pain  Patient Self Reported Health Status: fair  Rehabilitation Potential: good    Short Term Goals: To be accomplished in 2 treatments:                         1.) The patient will be independent with their HEP consistently for at least one week. Long Term Goals: To be accomplished in 16 treatments:                         1.) The patient will have at most 6/10 pain with daily activities. 2.) The patient will improve her cervical rotation to at least 35 degrees bilaterally to assist with daily activities. 3.) The patient will improve their FOTO score from 46 to at least 51 to show improvements in functional mobility. Frequency / Duration: Patient to be seen 2 times per week for 16 treatments. Patient/ Caregiver education and instruction: self care, activity modification and exercises    [x]  Plan of care has been reviewed with GREY Barroso, PT 3/7/2022     ________________________________________________________________________    I certify that the above Therapy Services are being furnished while the patient is under my care.  I agree with the treatment plan and certify that this therapy is necessary.     Physician's Signature:____________________  Date:____________Time: _________      Kennedy Johansen MD

## 2022-03-16 ENCOUNTER — HOSPITAL ENCOUNTER (OUTPATIENT)
Dept: PHYSICAL THERAPY | Age: 62
Discharge: HOME | End: 2022-03-16

## 2022-03-16 PROCEDURE — 97110 THERAPEUTIC EXERCISES: CPT | Performed by: PHYSICAL THERAPIST

## 2022-03-16 PROCEDURE — 97012 MECHANICAL TRACTION THERAPY: CPT | Performed by: PHYSICAL THERAPIST

## 2022-03-16 NOTE — PROGRESS NOTES
PT DAILY TREATMENT NOTE 2-15    Patient Name: Tea Hyde  Date:3/16/2022  : 1960  [x]  Patient  Verified  Payor: González  / Plan:  [address] / Product Type: HMO /    In time: 2:57pm  Out time: 3:48pm  Total Treatment Time (min): 51  Visit #:  2    Treatment Area: Cervical disc disorder with radiculopathy, unspecified cervical region [M50.10]    SUBJECTIVE  Pain Level (0-10 scale): 6.5  Any medication changes, allergies to medications, adverse drug reactions, diagnosis change, or new procedure performed?: [x] No    [] Yes (see summary sheet for update)  Subjective functional status/changes:   [] No changes reported  The patient reports that she hasn't noticed much of a difference with the exercises, but admits that it's only been one week. OBJECTIVE    Modality rationale: decrease edema, decrease inflammation, decrease pain and increase tissue extensibility to improve the patients ability to perform daily activities.     Min Type Additional Details       [] Estim: []Att   []Unatt    []TENS instruct                  []IFC  []Premod   []NMES                     []Other:  []w/US   []w/ice   []w/heat  Position:  Location:      15 [x]  Traction: [x] Cervical       []Lumbar                       [] Prone          [x]Supine                       [x]Intermittent   []Continuous Lbs: 13-18lbs  [] before manual  [] after manual  []w/heat    []  Ultrasound: []Continuous   [] Pulsed                       at: []1MHz   []3MHz Location:  W/cm2:    [] Paraffin         Location:   []w/heat    []  Ice     []  Heat  []  Ice massage Position:  Location:    []  Laser  []  Other: Position:  Location:      []  Vasopneumatic Device Pressure:       [] lo [] med [] hi   Temperature:      [x] Skin assessment post-treatment:  [x]intact []redness- no adverse reaction    []redness - adverse reaction:       36 min Therapeutic Exercise:  [x] See flow sheet :   Rationale: increase ROM, increase strength and improve coordination to improve the patients ability to perform daily activities. With   [x] TE   [] TA   [] Neuro   [] SC   [] other: Patient Education: [x] Review HEP    [x] Progressed/Changed HEP based on:   [x] positioning   [x] body mechanics   [] transfers   [] heat/ice application    [] other:      Other Objective/Functional Measures: none noted     Pain Level (0-10 scale) post treatment: 6    ASSESSMENT/Changes in Function:   The patient is progressing with strengthening and mobility as tolerated. Patient will continue to benefit from skilled PT services to modify and progress therapeutic interventions, address functional mobility deficits, address ROM deficits, address strength deficits, analyze and address soft tissue restrictions, analyze and cue movement patterns, analyze and modify body mechanics/ergonomics, assess and modify postural abnormalities, address imbalance/dizziness and instruct in home and community integration to attain remaining goals. [x]  See Plan of Care  []  See progress note/recertification  []  See Discharge Summary         Progress towards goals / Updated goals: The patient is progressing towards goals.      PLAN  [x]  Upgrade activities as tolerated     [x]  Continue plan of care  [x]  Update interventions per flow sheet       []  Discharge due to:_  []  Other:_      Reina Flores, PT 3/16/2022

## 2022-03-19 PROBLEM — L98.9 SKIN LESION OF FACE: Status: ACTIVE | Noted: 2017-11-10

## 2022-03-21 ENCOUNTER — PATIENT MESSAGE (OUTPATIENT)
Dept: INTERNAL MEDICINE CLINIC | Age: 62
End: 2022-03-21

## 2022-03-21 RX ORDER — METHYLPREDNISOLONE 4 MG/1
TABLET ORAL
Qty: 1 DOSE PACK | Refills: 0 | Status: SHIPPED | OUTPATIENT
Start: 2022-03-21 | End: 2022-03-28

## 2022-03-21 NOTE — TELEPHONE ENCOUNTER
Mac Sams LPN 2/12/0925 7:65 AM EDT      ----- Message -----  From: Easton Rojas  Sent: 3/21/2022 7:05 AM EDT  To: Nikky Villalobos  Subject: Joint Pain     Hi I am having a lot of joint and muscle pain. I was wondering if you could prescribe methylprednisolone to help. When you gave that to me before to see if it would help my neck, it helped everything else. I need some relief, even if it's just short term. If you will please send to [facility] in [place] my insurance has changed to them from [facility]. Thank you!

## 2022-03-23 ENCOUNTER — APPOINTMENT (OUTPATIENT)
Dept: PHYSICAL THERAPY | Age: 62
End: 2022-03-23

## 2022-03-28 ENCOUNTER — OFFICE VISIT (OUTPATIENT)
Dept: NEUROLOGY | Age: 62
End: 2022-03-28
Payer: COMMERCIAL

## 2022-03-28 ENCOUNTER — OFFICE VISIT (OUTPATIENT)
Dept: INTERNAL MEDICINE CLINIC | Age: 62
End: 2022-03-28

## 2022-03-28 VITALS
RESPIRATION RATE: 18 BRPM | BODY MASS INDEX: 33.38 KG/M2 | DIASTOLIC BLOOD PRESSURE: 76 MMHG | OXYGEN SATURATION: 100 % | HEART RATE: 93 BPM | WEIGHT: 170 LBS | HEIGHT: 60 IN | TEMPERATURE: 97.4 F | SYSTOLIC BLOOD PRESSURE: 160 MMHG

## 2022-03-28 VITALS
BODY MASS INDEX: 33.01 KG/M2 | HEART RATE: 82 BPM | WEIGHT: 169 LBS | RESPIRATION RATE: 18 BRPM | OXYGEN SATURATION: 97 % | DIASTOLIC BLOOD PRESSURE: 86 MMHG | TEMPERATURE: 97.6 F | SYSTOLIC BLOOD PRESSURE: 144 MMHG

## 2022-03-28 DIAGNOSIS — M47.22 CERVICAL RADICULOPATHY DUE TO DEGENERATIVE JOINT DISEASE OF SPINE: Primary | ICD-10-CM

## 2022-03-28 DIAGNOSIS — M34.9 SCLERODERMA (HCC): ICD-10-CM

## 2022-03-28 DIAGNOSIS — I73.00 RAYNAUD'S DISEASE WITHOUT GANGRENE: ICD-10-CM

## 2022-03-28 DIAGNOSIS — I10 PRIMARY HYPERTENSION: Primary | ICD-10-CM

## 2022-03-28 DIAGNOSIS — G44.86 CERVICOGENIC HEADACHE: ICD-10-CM

## 2022-03-28 PROCEDURE — 99214 OFFICE O/P EST MOD 30 MIN: CPT | Performed by: INTERNAL MEDICINE

## 2022-03-28 PROCEDURE — 99204 OFFICE O/P NEW MOD 45 MIN: CPT | Performed by: PSYCHIATRY & NEUROLOGY

## 2022-03-28 RX ORDER — MELOXICAM 7.5 MG/1
7.5 TABLET ORAL DAILY
Qty: 30 TABLET | Refills: 5 | Status: SHIPPED | OUTPATIENT
Start: 2022-03-28 | End: 2022-06-23

## 2022-03-28 RX ORDER — NIFEDIPINE 30 MG/1
30 TABLET, FILM COATED, EXTENDED RELEASE ORAL DAILY
Qty: 30 TABLET | Refills: 1 | Status: SHIPPED | OUTPATIENT
Start: 2022-03-28 | End: 2022-04-25 | Stop reason: SDUPTHER

## 2022-03-28 RX ORDER — TIZANIDINE 2 MG/1
2 TABLET ORAL
Qty: 100 TABLET | Refills: 5 | Status: SHIPPED | OUTPATIENT
Start: 2022-03-28 | End: 2022-06-23

## 2022-03-28 RX ORDER — LORAZEPAM 0.5 MG/1
TABLET ORAL
Qty: 5 TABLET | Refills: 0 | Status: SHIPPED | OUTPATIENT
Start: 2022-03-28 | End: 2022-04-25 | Stop reason: ALTCHOICE

## 2022-03-28 NOTE — PROGRESS NOTES
1. \"Have you been to the ER, urgent care clinic since your last visit? Hospitalized since your last visit? \" No    2. \"Have you seen or consulted any other health care providers outside of the [address] [place] [address] since your last visit? \" No     3. For patients aged 39-70: Has the patient had a colonoscopy / FIT/ Cologuard? Yes - no Care Gap present      If the patient is female:    4. For patients aged 41-77: Has the patient had a mammogram within the past 2 years? Yes - Care Gap present. Rooming MA/LPN to request most recent results      5. For patients aged 21-65: Has the patient had a pap smear? Yes - Care Gap present.  Rooming MA/LPN to request most recent results    [address]

## 2022-03-28 NOTE — PROGRESS NOTES
Ms. Tammi Alves is presenting to follow up     CC:  No chief complaint on file. HPI:  Headache - patient was seen Early October for HA   Onset after Moderna vaccination   Work up included MRI brain which was normal followed by cervical x ray which showed degenerative disc disease C5- C6  Noted mild improvement with elavil  HA resolved  Neck pain is present and wonders if neck is cause of HA  When she ran out of elavil had back of head of pain   She saw neurologist today and started on tizanidine and mobic and advised to continue with PT nd will have MRI neck       Anxiety:since has been off of xanax anxiety is much better. Anxiety from time to time. Positive SALVADOR and centromere B Ab/positive Raynaud's-noted some skin changes. Patient saw Dr. Elpidio Rice. Normal echocardiogram without evidence of pulmonary HTN   She has an EGD on 6/22/2021 which showed GAVE. PFT on 4/02/2021 did not show restrictive disease. Echocardiogram on 4/02/2021 did not show pulmonary hypertension. She continues to have bouts of mild dyspnea which patient attributes to weight     Her PFTs showed   1. Very mild airflow obstruction. 2.  No restrictive lung disease. 3.  Air trapping is present. 4.  Normal DLCO. 5.  Normal flow-volume loop. CT chest 09/01   Minimal linear scarring at the lung bases. She is now having issues with raynauds and fingers turning white and started on amlodipine 5mg daily. Took half pill for 3 days and felt horrible and had no appetite, did not feel well. Repeated dose and felt ill again stopped    Several years ago had several allergies and started on singulair and resolved symptoms.      Singulair has not made any improvement ( told in the past she has allergy induced asthma) therefore she stopped Singulair    Smoked for very short times in her 20s  less then 10 pack years  Normal stress test in 2018     Blood pressure is elevated on consecutive checks today it is 160/76 denies chest pain and dyspnea. Discussed treatment is warranted    Up to date mammogram at [place] [facility] and pap smear  Current weight is 170lbs    Hx of hypothyroidism : on armour thyroid and doing well.  Recent TSH in range  No hair loss, or constipation or brittle nails  Review of systems:  Constitutional: negative for fever, chills, weight loss, night sweats   10 systems reviewed and negative other then HPI     Past Medical History:   Diagnosis Date    Anxiety     Asthma 2008    allergy induced in [address] ([address]     basal cell 20 years ago upper abdomen    Foot fracture     Gall stones     Hyperlipidemia     Hypothyroidism     Interstitial cystitis     Dr[facility]ain at [facility] Interstitial cystitis     Kidney stones     Menopausal disorder     Other ill-defined conditions(599.84)     kidney stones in [place]    PONV (postoperative nausea and vomiting)     severe nausea 1x in the past    Psychiatric disorder 11/10    anxiety attack    Thyroid disease         Past Surgical History:   Procedure Laterality Date    HX ADENOIDECTOMY  1977    HX CHOLECYSTECTOMY      HX DILATION AND CURETTAGE      miscarriage x1    HX ENDOSCOPY      HX GI  2010    cholecystectomy    HX GYN      laparoscopy for fertility    HX OTHER SURGICAL  2006    hemithyroidectomy right, gall bladder removal    HX TONSILLECTOMY  1977    WA BREAST SURGERY PROCEDURE UNLISTED      left breast lumpectomy       Allergies   Allergen Reactions    Adhesive Other (comments)     Eat skin off    Codeine Anaphylaxis    Codeine Sulfate Anaphylaxis    Darvon [Propoxyphene] Itching    Percocet [Oxycodone-Acetaminophen] Itching    Sulfa (Sulfonamide Antibiotics) Hives    Vicodin [Hydrocodone-Acetaminophen] Itching    Avocado Swelling    Demerol [Meperidine] Itching    Dilaudid [Hydromorphone] Itching    Lexapro [Escitalopram] Other (comments)     shake    Sulfamethoxazole-Trimethoprim Rash       Current Outpatient Medications on File Prior to Visit Medication Sig Dispense Refill    magnesium 250 mg tab Take  by mouth.  zinc sulfate (ZINC-15 PO) Take  by mouth.  cholecalciferol (Vitamin D3) (5000 Units/125 mcg) tab tablet Take 5,000 Units by mouth daily.  amitriptyline (ELAVIL) 10 mg tablet Take 2 Tablets by mouth nightly. 60 Tablet 5    acetaminophen (TylenoL) 325 mg tablet Take  by mouth every four (4) hours as needed for Pain.  montelukast (SINGULAIR) 10 mg tablet TAKE 1 TABLET BY MOUTH EVERY DAY 30 Tablet 1    Omeprazole delayed release (PRILOSEC D/R) 20 mg tablet Take 20 mg by mouth two (2) times a day.  albuterol (PROVENTIL HFA, VENTOLIN HFA, PROAIR HFA) 90 mcg/actuation inhaler Take 1 Puff by inhalation every six (6) hours as needed for Wheezing. 1 Inhaler 2    Dieterich Thyroid 90 mg tablet TAKE 1 TABLET BY MOUTH  DAILY 90 Tab 3    cyanocobalamin (VITAMIN B-12) 1,000 mcg tablet Take 5,000 mcg by mouth daily.  estradiol-norethindrone (COMBIPATCH) 0.05-0.25 mg/24 hr 1 Patch by TransDERmal route two (2) times a week. No current facility-administered medications on file prior to visit. family history includes Cancer in her mother; Heart Disease in her brother and father. Social History     Socioeconomic History    Marital status:      Spouse name: Not on file    Number of children: Not on file    Years of education: Not on file    Highest education level: Not on file   Occupational History    Not on file   Tobacco Use    Smoking status: Former Smoker     Packs/day: 1.00     Years: 12.00     Pack years: 12.00     Types: Cigarettes     Quit date: 1990     Years since quittin.1    Smokeless tobacco: Never Used   Vaping Use    Vaping Use: Never used   Substance and Sexual Activity    Alcohol use: Not Currently     Comment: rarely    Drug use: Never    Sexual activity: Yes   Other Topics Concern    Not on file   Social History Narrative    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   Living with      Social Determinants of Health     Financial Resource Strain:     Difficulty of Paying Living Expenses: Not on file   Food Insecurity:     Worried About Running Out of Food in the Last Year: Not on file    [place] of Food in the Last Year: Not on file   Transportation Needs:     Lack of Transportation (Medical): Not on file    Lack of Transportation (Non-Medical): Not on file   Physical Activity:     Days of Exercise per Week: Not on file    Minutes of Exercise per Session: Not on file   Stress:     Feeling of Stress : Not on file   Social Connections:     Frequency of Communication with Friends and Family: Not on file    Frequency of Social Gatherings with Friends and Family: Not on file    Attends Orthodoxy Services: Not on file    Active Member of [address] [place] [address] [employer] or Organizations: Not on file    Attends Club or Organization Meetings: Not on file    Marital Status: Not on file   Intimate Partner Violence:     Fear of Current or Ex-Partner: Not on file    Emotionally Abused: Not on file    Physically Abused: Not on file    Sexually Abused: Not on file   Housing Stability:     Unable to Pay for Housing in the Last Year: Not on file    Number of [place] in the Last Year: Not on file    Unstable Housing in the Last Year: Not on file       Visit Vitals  BP (!) 160/76   Pulse 93   Temp 97.4 °F (36.3 °C) (Temporal)   Resp 18   Ht 5' (1.524 m)   Wt 170 lb (77.1 kg)   LMP 12/23/2011   SpO2 100%   BMI 33.20 kg/m²     General:  Well appearing female no acute distress  HEENT:   PERRL,normal conjunctiva. External ear and canals normal, TMs normal.    Neck:  Supple. Thyroid normal size, nontender, without nodules. No carotid bruit. No masses or lymphadenopathy  Respiratory: no respiratory distress,  no wheezing, no rhonchi, no rales. No chest wall tenderness. Cardiovascular:  RRR, normal S1S2, no murmur. Gastrointestinal: normal bowel sounds, soft, nontender, without masses.     Extremities +2 pulses, no edema, normal sensation   Musculoskeletal:  Normal gait. Normal digits and nails. Normal strength and tone, no atrophy, and no abnormal movement. Skin:  No rash, no lesions, no ulcers. There is raynauds phenomenon on several fingers  Neuro:  A and OX4, fluent speech, cranial nerves normal 2-12. Psych:  Normal affect      Lab Results   Component Value Date/Time    WBC 8.2 07/29/2021 07:58 AM    HGB 13.5 07/29/2021 07:58 AM    HCT 40.3 07/29/2021 07:58 AM    PLATELET 502 67/19/3516 07:58 AM    MCV 89 07/29/2021 07:58 AM     Lab Results   Component Value Date/Time    Sodium 138 02/10/2022 08:36 AM    Potassium 4.0 02/10/2022 08:36 AM    Chloride 102 02/10/2022 08:36 AM    CO2 23 02/10/2022 08:36 AM    Anion gap 7 03/22/2021 07:34 AM    Glucose 88 02/10/2022 08:36 AM    BUN 15 02/10/2022 08:36 AM    Creatinine 0.70 02/10/2022 08:36 AM    BUN/Creatinine ratio 21 02/10/2022 08:36 AM    GFR est  02/10/2022 08:36 AM    GFR est non-AA 94 02/10/2022 08:36 AM    Calcium 9.2 02/10/2022 08:36 AM     Lab Results   Component Value Date/Time    Cholesterol, total 196 02/10/2022 08:36 AM    HDL Cholesterol 59 02/10/2022 08:36 AM    LDL, calculated 123 (H) 02/10/2022 08:36 AM    LDL, calculated 113 (H) 12/31/2019 08:55 AM    VLDL, calculated 14 02/10/2022 08:36 AM    VLDL, calculated 11 12/31/2019 08:55 AM    Triglyceride 75 02/10/2022 08:36 AM     Lab Results   Component Value Date/Time    TSH 1.800 02/10/2022 08:36 AM     Lab Results   Component Value Date/Time    Hemoglobin A1c 5.1 06/10/2019 02:47 PM     Lab Results   Component Value Date/Time    VITAMIN D, 25-HYDROXY 46.0 12/31/2019 08:55 AM                   Assessment and Plan:     1. Primary hypertension  - new diagnosis. Since patient has #3 Raynauds choosing an agent that treats that as well. She did not tolerate amlodipine  - NIFEdipine ER (ADALAT CC) 30 mg ER tablet; Take 1 Tablet by mouth daily.  Indications: high blood pressure, Raynaud's phenomenon, a condition where blood vessels constrict too much with coldness or stress  Dispense: 30 Tablet; Refill: 1         Raynaud's disease without gangrene: start nifedipine ( did not tolerate amlodipine)     Scleroderma (HCC) Limites Systemic Scleroderma  has Raynaud's GERD, GAVE and SALVADOR   Not on therapy  Dr Matthew Luevano was rheumatologist establishing with new rheumatologist  PFT showed reactive airway disease but no restrictive lung disease. Her echocardiogram did not show pulmonary artery hypertension. She had a recent CXR on 3/22/2021 which showed engorged pulmonary vasculature but no interstitial lung disease. CT chest on 9/01/2021 showed no gross pathology. Postoperative hypothyroidism\" euthyroid on current therapy   - TSH 3RD GENERATION  - T4, FREE  - METABOLIC PANEL, COMPREHENSIVE  - LIPID PANEL    Cervicalgia and HAs : MRI brain ok, saw neurologist and plans to start tizanidine and NSAID   MRI neck ordered and pending  Follow-up and Dispositions    · Return in about 4 weeks (around 4/25/2022).           Meño Diego MD

## 2022-03-28 NOTE — PATIENT INSTRUCTIONS
Start nifedipine 30mg daily long acting  For both blood pressure and Raynauds  If doing well then can take mobic and if tolerating mobic then tizanidine as needed

## 2022-03-29 ENCOUNTER — TELEPHONE (OUTPATIENT)
Dept: NEUROLOGY | Age: 62
End: 2022-03-29

## 2022-03-29 NOTE — TELEPHONE ENCOUNTER
Pt called to let us know that she uploaded the short term disability form through the portal. Confirmed form came through and is in media. Please have Dr. Abdifatah Bain fill out.

## 2022-03-29 NOTE — PROGRESS NOTES
Consult  REFERRED BY:  Kennedy Johansen MD    CHIEF COMPLAINT: Increasing neck pain ever since she had a Covid vaccination in October 2021      Subjective:     Isabelle Owens is a 62 y.o. right-handed  female, seen as a new patient to me, at the request of Dr. Omar Mccoy of new problem of severe headache and neck pain that began after she received a Covid vaccination in October 2021, and had severe headaches and neck pain ever since, and feels that mainly her problems are really her neck pain and the headaches come from the neck. She has them almost every day or every other day, and feel that she is getting increasingly incapacitated from the pain. She feels that if she could just stay out of work for a few weeks and let her neck rest, because she is constantly raising and lowering her head to follow the computer screen next aggravating her neck. 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 She has Sjogren's syndrome and chronic pain from that and sees a rheumatologist.  She has a positive SALVADOR and a centromere B antibody positive Raynaud's syndrome in addition and has seen Dr. Iman Silveira. She does not appear to be on any active rheumatoid treatment at this time. She was given amitriptyline 10 mg at nighttime for her headaches and neck pain and it did help some, but she still does not sleep through the night has the pain, we will increase that to 20 mg at night. She cannot take too many nonsteroidals, we will try low-dose Celebrex 100 mg dose to see if that will help some of the arthritis in the neck, and gave her Zanaflex to try 2 mg 1-3 times a day as needed and tolerated for muscle relaxant because her neck is still stiff.   We may need to get an MRI of the neck, and we did order that because she has tried physical therapy and has not gotten any better and has so much pain now that she has to go on disability and is having increasing numbness radiating into her arms, in the C5-6 distribution, and did get better with the Medrol, so hopefully she will get better with an anti-inflammatory. She denies any past history of trauma to the neck, or injury, and denies any history of head problems, and denies any weakness in the arms, but feels at times that her hands and arms are weak and she cannot open things or  things like she used to. Her bowel bladder function remain stable she does not have much back pain and has no cranial nerve symptoms. She had no fever or meningismus. Her past medical history. For anxiety and depression, foot fracture, hyperlipidemia, hypothyroidism, interstitial cystitis, kidney stones, and basal cell cancer and allergies and anxiety and thyroid disease.     Past Medical History:   Diagnosis Date    Anxiety     Asthma 2008    allergy [place] [address] ([address]     basal cell 20 years ago upper abdomen    Foot fracture     Gall stones     Hyperlipidemia     Hypothyroidism     Interstitial cystitis     [facility] at [facility] Interstitial cystitis     Kidney stones     Menopausal disorder     Other ill-defined conditions(779.33)     kidney stones in pass    PONV (postoperative nausea and vomiting)     severe nausea 1x in the past    Psychiatric disorder 11/10    anxiety attack    Thyroid disease       Past Surgical History:   Procedure Laterality Date    HX ADENOIDECTOMY  1977    HX CHOLECYSTECTOMY      HX DILATION AND CURETTAGE      miscarriage x1    HX ENDOSCOPY      HX GI  2010    cholecystectomy    HX GYN      laparoscopy for fertility    HX OTHER SURGICAL  2006    hemithyroidectomy right, gall bladder removal    HX TONSILLECTOMY  1977    RI BREAST SURGERY PROCEDURE UNLISTED      left breast lumpectomy     Family History   Problem Relation Age of Onset    Cancer Mother     Heart Disease Father     Heart Disease Brother       Social History     Tobacco Use    Smoking status: Former Smoker     Packs/day: 1.00     Years: 12.00     Pack years: 12.00     Types: Cigarettes     Quit date: 1990     Years since quittin.1    Smokeless tobacco: Never Used   Substance Use Topics    Alcohol use: Not Currently     Comment: rarely         Current Outpatient Medications:     meloxicam (MOBIC) 7.5 mg tablet, Take 1 Tablet by mouth daily. , Disp: 30 Tablet, Rfl: 5    tiZANidine (ZANAFLEX) 2 mg tablet, Take 1 Tablet by mouth three (3) times daily as needed for Muscle Spasm(s). , Disp: 100 Tablet, Rfl: 5    LORazepam (ATIVAN) 0.5 mg tablet, 1 to 2 tablets p.o. 1 to 2 hours prior to MRI scan and during scan as needed, Disp: 5 Tablet, Rfl: 0    magnesium 250 mg tab, Take  by mouth., Disp: , Rfl:     zinc sulfate (ZINC-15 PO), Take  by mouth., Disp: , Rfl:     cholecalciferol (Vitamin D3) (5000 Units/125 mcg) tab tablet, Take 5,000 Units by mouth daily. , Disp: , Rfl:     amitriptyline (ELAVIL) 10 mg tablet, Take 2 Tablets by mouth nightly., Disp: 60 Tablet, Rfl: 5    acetaminophen (TylenoL) 325 mg tablet, Take  by mouth every four (4) hours as needed for Pain., Disp: , Rfl:     montelukast (SINGULAIR) 10 mg tablet, TAKE 1 TABLET BY MOUTH EVERY DAY, Disp: 30 Tablet, Rfl: 1    Omeprazole delayed release (PRILOSEC D/R) 20 mg tablet, Take 20 mg by mouth two (2) times a day., Disp: , Rfl:     albuterol (PROVENTIL HFA, VENTOLIN HFA, PROAIR HFA) 90 mcg/actuation inhaler, Take 1 Puff by inhalation every six (6) hours as needed for Wheezing., Disp: 1 Inhaler, Rfl: 2    [place] Thyroid 90 mg tablet, TAKE 1 TABLET BY MOUTH  DAILY, Disp: 90 Tab, Rfl: 3    cyanocobalamin (VITAMIN B-12) 1,000 mcg tablet, Take 5,000 mcg by mouth daily. , Disp: , Rfl:     estradiol-norethindrone (COMBIPATCH) 0.05-0.25 mg/24 hr, 1 Patch by TransDERmal route two (2) times a week., Disp: , Rfl:     NIFEdipine ER (ADALAT CC) 30 mg ER tablet, Take 1 Tablet by mouth daily. Indications: high blood pressure, Raynaud's phenomenon, a condition where blood vessels constrict too much with coldness or stress, Disp: 30 Tablet, Rfl: 1        Allergies   Allergen Reactions    Adhesive Other (comments)     Eat skin off    Codeine Anaphylaxis    Codeine Sulfate Anaphylaxis    Darvon [Propoxyphene] Itching    Percocet [Oxycodone-Acetaminophen] Itching    Sulfa (Sulfonamide Antibiotics) Hives    Vicodin [Hydrocodone-Acetaminophen] Itching    Avocado Swelling    Demerol [Meperidine] Itching    Dilaudid [Hydromorphone] Itching    Lexapro [Escitalopram] Other (comments)     shake    Sulfamethoxazole-Trimethoprim Rash      MRI Results (most recent):  Results from Hospital Encounter encounter on 10/25/21    MRI BRAIN W WO CONT    Narrative  EXAM:  MRI BRAIN W WO CONT    INDICATION:    Acute intractable headache. COMPARISON:  None. CONTRAST: 15 ml ProHance. TECHNIQUE:  Multiplanar multisequence acquisition without and with contrast of the brain. FINDINGS:  The ventricles are normal in size and position. The brain parenchyma has normal  signal characteristics for age. There is no acute infarct, hemorrhage,  extra-axial fluid collection, or mass effect. There is no cerebellar tonsillar  herniation. Expected arterial flow-voids are present. No evidence of abnormal  enhancement. The paranasal sinuses, mastoid air cells, and middle ears are clear. The orbital  contents are within normal limits. There is a prominent osteoma noted along the  left anterior frontal convexity arising from the inner table of the left frontal  bone measuring 2.5 x 1.0 x 1.4 cm. Impression  1. No significant intracranial abnormality.  Incidental prominent osteoma along  the left anterior frontal convexity arising from the inner table of the left  frontal bone measuring 2.5 x 1.0 x 1.4 cm. Results from [place] encounter on 10/25/21    MRI BRAIN W WO CONT    Narrative  EXAM:  MRI BRAIN W WO CONT    INDICATION:    Acute intractable headache. COMPARISON:  None. CONTRAST: 15 ml ProHance. TECHNIQUE:  Multiplanar multisequence acquisition without and with contrast of the brain. FINDINGS:  The ventricles are normal in size and position. The brain parenchyma has normal  signal characteristics for age. There is no acute infarct, hemorrhage,  extra-axial fluid collection, or mass effect. There is no cerebellar tonsillar  herniation. Expected arterial flow-voids are present. No evidence of abnormal  enhancement. The paranasal sinuses, mastoid air cells, and middle ears are clear. The orbital  contents are within normal limits. There is a prominent osteoma noted along the  left anterior frontal convexity arising from the inner table of the left frontal  bone measuring 2.5 x 1.0 x 1.4 cm. Impression  1. No significant intracranial abnormality. Incidental prominent osteoma along  the left anterior frontal convexity arising from the inner table of the left  frontal bone measuring 2.5 x 1.0 x 1.4 cm. Review of Systems:  A comprehensive review of systems was negative except for: Constitutional: positive for fatigue and malaise  Musculoskeletal: positive for myalgias, arthralgias, stiff joints, neck pain and muscle weakness  Neurological: positive for headaches, paresthesia, weakness and Neck pain  Behvioral/Psych: positive for anxiety and depression   Vitals:    03/28/22 1305 03/28/22 1330   BP: (!) 160/80 (!) 144/86   Pulse: 82    Resp: 18    Temp: 97.6 °F (36.4 °C)    TempSrc: Temporal    SpO2: 97%    Weight: 169 lb (76.7 kg)      Objective:     I      NEUROLOGICAL EXAM:    Appearance: The patient is well developed, well nourished, mildly overweight, provides a coherent history and is in no acute distress.    Mental Status: Oriented to time, place and person, and the president, cognitive function is normal and speech is fluent and no aphasia or dysarthria. Mood and affect appropriate, and patient very anxious. Cranial Nerves:   Intact visual fields. Fundi are benign, disc are flat, no lesions seen on funduscopy. ALYSON, EOM's full, no nystagmus, no ptosis. Facial sensation is normal. Corneal reflexes are not tested. Facial movement is symmetric. Hearing is normal bilaterally. Palate is midline with normal sternocleidomastoid and trapezius muscles are normal. Tongue is midline. Neck without meningismus or bruits, but patient has marked tightness in her muscles and limited range of motion in all directions, particularly in hyperextension. Temporal arteries are not tender or enlarged  TMJ areas are not tender on palpation   Motor:  5/5 strength in upper and lower proximal and distal muscles. Normal bulk and tone. No fasciculations. Rapid alternating movement is symmetric and intact bilaterally   Reflexes:   Deep tendon reflexes 2+/4 and symmetrical.  No babinski or clonus present   Sensory:   Normal to touch, pinprick and vibration and temperature. DSS is intact   Gait:  Normal gait for patient's age. Tremor:   No tremor noted. Cerebellar:  No abnormal cerebellar signs present on Romberg and tandem testing and finger-nose-finger exam.   Neurovascular:  Normal heart sounds and regular rhythm, peripheral pulses intact, and no carotid bruits. Assessment:       ICD-10-CM ICD-9-CM    1. Cervical radiculopathy due to degenerative joint disease of spine  M47.22 721.0 meloxicam (MOBIC) 7.5 mg tablet      tiZANidine (ZANAFLEX) 2 mg tablet      MRI CERV SPINE WO CONT      LORazepam (ATIVAN) 0.5 mg tablet      EMG LIMITED   2.  Cervicogenic headache  G44.86 784.0 meloxicam (MOBIC) 7.5 mg tablet      tiZANidine (ZANAFLEX) 2 mg tablet      MRI CERV SPINE WO CONT      LORazepam (ATIVAN) 0.5 mg tablet      EMG LIMITED     Active Problems:    * No active hospital problems. *      Plan:     Patient with severe neck pain, seemingly in the C5-6 distribution into her arms, associated with some weakness and numbness, and failure to respond to physical therapy, or even steroids completely, we will check an MRI of the neck to rule out herniated disc or cervical spinal stenosis, and check an EMG study of both arms. She is also to get some disability forms, we will agree to give her 1 month out of work to see if that will help her neck letter neck rest, while she continues to try physical therapy given that was been ineffective in the past and the traction they gave her made her much worse. She will try low-dose Celebrex and muscle relaxant Zanaflex will increase the amitriptyline to 20 mg at night in addition to try to help her pain. She does not get better she may need to see Dr. Dov Cerna one of the pain management physicians to try to help her if she does not have surgical disease. Further treatment evaluation will depend on the results of her clinical course and response to therapy as above, she will check my chart for test results and she was given Ativan to help her get the MRI because she is claustrophobic. 55 minutes met the patient, going over all her problems, going over her previous x-rays, discussing her diagnosis prognosis and treatment options, even discussing her disability for short-term disability for 1 month just to see if she will get better, and did advise her to try to continue physical therapy even though initially made her worse with traction she can just keep the traction.   We reviewed all her x-rays in detail, nonacute interpretations as given and that she probably has the left osteoma in the frontal region and probably 1 that was missed in the high right parietal parasylvian region and these look benign and asymptomatic    Signed By: Mando Flowers MD     March 28, 2022       CC: Colton Zhu MD  FAX: [phone]

## 2022-03-30 ENCOUNTER — HOSPITAL ENCOUNTER (OUTPATIENT)
Dept: PHYSICAL THERAPY | Age: 62
Discharge: HOME | End: 2022-03-30

## 2022-03-30 DIAGNOSIS — E89.0 POSTOPERATIVE HYPOTHYROIDISM: ICD-10-CM

## 2022-03-30 PROCEDURE — 97110 THERAPEUTIC EXERCISES: CPT | Performed by: PHYSICAL THERAPIST

## 2022-03-30 PROCEDURE — 97014 ELECTRIC STIMULATION THERAPY: CPT | Performed by: PHYSICAL THERAPIST

## 2022-03-30 NOTE — PROGRESS NOTES
PT DAILY TREATMENT NOTE 2-15    Patient Name: Sean Gaitan  Date:3/30/2022  : 1960  [x]  Patient  Verified  Payor: [employer] / Plan:  [facility] [address] / Product Type: HMO /    In time: 2:58pm  Out time: 3:55pm  Total Treatment Time (min): 62  Visit #:  3    Treatment Area: Cervical disc disorder with radiculopathy, unspecified cervical region [M50.10]    SUBJECTIVE  Pain Level (0-10 scale): 9  Any medication changes, allergies to medications, adverse drug reactions, diagnosis change, or new procedure performed?: [x] No    [] Yes (see summary sheet for update)  Subjective functional status/changes:   [] No changes reported  The patient reports that she's been flared up since doing the traction and doesn't want to do that again. She didn't think about trying her TENS unit. OBJECTIVE    Modality rationale: decrease edema, decrease inflammation, decrease pain and increase tissue extensibility to improve the patients ability to perform daily activities.     Min Type Additional Details      15 [x] Estim: []Att   [x]Unatt    []TENS instruct                  [x]IFC  []Premod   []NMES                     []Other:  []w/US   []w/ice   [x]w/heat  Position: supine  Location: cervical     []  Traction: [] Cervical       []Lumbar                       [] Prone          []Supine                       []Intermittent   []Continuous Lbs:  [] before manual  [] after manual  []w/heat    []  Ultrasound: []Continuous   [] Pulsed                       at: []1MHz   []3MHz Location:  W/cm2:    [] Paraffin         Location:   []w/heat    []  Ice     []  Heat  []  Ice massage Position:  Location:    []  Laser  []  Other: Position:  Location:      []  Vasopneumatic Device Pressure:       [] lo [] med [] hi   Temperature:      [x] Skin assessment post-treatment:  [x]intact []redness- no adverse reaction    []redness - adverse reaction:       42 min Therapeutic Exercise:  [x] See flow sheet :   Rationale: increase ROM, increase strength and improve coordination to improve the patients ability to perform daily activities. With   [x] TE   [] TA   [] Neuro   [] SC   [] other: Patient Education: [x] Review HEP    [x] Progressed/Changed HEP based on:   [x] positioning   [x] body mechanics   [] transfers   [] heat/ice application    [] other:      Other Objective/Functional Measures: none noted     Pain Level (0-10 scale) post treatment: 0    ASSESSMENT/Changes in Function:   The patient progressed with modified therex as tolerated and responded very well to IFC. Patient will continue to benefit from skilled PT services to modify and progress therapeutic interventions, address functional mobility deficits, address ROM deficits, address strength deficits, analyze and address soft tissue restrictions, analyze and cue movement patterns, analyze and modify body mechanics/ergonomics, assess and modify postural abnormalities, address imbalance/dizziness and instruct in home and community integration to attain remaining goals. [x]  See Plan of Care  []  See progress note/recertification  []  See Discharge Summary         Progress towards goals / Updated goals: The patient is progressing towards goals.      PLAN  [x]  Upgrade activities as tolerated     [x]  Continue plan of care  [x]  Update interventions per flow sheet       []  Discharge due to:_  []  Other:_      Gaye June, PT 3/30/2022

## 2022-03-31 RX ORDER — THYROID, PORCINE 90 MG/1
TABLET ORAL
Qty: 90 TABLET | Refills: 3 | Status: SHIPPED | OUTPATIENT
Start: 2022-03-31 | End: 2022-04-25 | Stop reason: ALTCHOICE

## 2022-04-06 ENCOUNTER — HOSPITAL ENCOUNTER (OUTPATIENT)
Dept: PHYSICAL THERAPY | Age: 62
Discharge: HOME | End: 2022-04-06
Payer: COMMERCIAL

## 2022-04-06 PROCEDURE — 97014 ELECTRIC STIMULATION THERAPY: CPT | Performed by: PHYSICAL THERAPIST

## 2022-04-06 PROCEDURE — 97110 THERAPEUTIC EXERCISES: CPT | Performed by: PHYSICAL THERAPIST

## 2022-04-06 NOTE — PROGRESS NOTES
[facility]  [address] [place]  Phone: [phone] Fax: [phone]    Discharge Summary  2-15    Patient name: Mary Bonilla  : 1960  Provider#: [id number]  Referral source: Jerry Rocha MD      Medical/Treatment Diagnosis: Cervical disc disorder with radiculopathy, unspecified cervical region [M50.10]     Prior Hospitalization: see medical history     Comorbidities: See Plan of Care  Prior Level of Function:See Plan of Care  Medications: Verified on Patient Summary List    Start of Care: 3/7/22      Onset Date: 2021   Visits from Start of Care: 4     Missed Visits: 0  Reporting Period : 3/7/22 to 22      ASSESSMENT/SUMMARY OF CARE: The patient has improved her AROM rotation as follows: R= 60 (28, p! At eval); L= 45 (26 at eval). She initially reported that by 10am she had difficulty holding her head up at work, but was able to tolerate slightly longer. She's been averaging 5-6/10 pain over the past few days, which is much better. She has been feeling better ever since going on short term disability for the past 4 work days (not having to look up and down at a computer). She self reports feeling 45% better since the start of therapy. She did not respond well to cervical traction, but she did respond really well to IFC with moist heat. She has a home TENS unit that she will utilize, along with her safe and progressive HEP, to maintain and progress improvements made thus far independently. It was agreed that if she keeps up with her strengthening and mobility exercises at least 3x/wk for 2 more months, and she continues to have significant issues, she will get a script and return to therapy if needed. Short Term Goals: To be accomplished in 2 treatments:                         8.) The patient will be independent with their HEP consistently for at least one week. - MET  Long Term Goals: To be accomplished in 16 treatments:                         1.) The patient will have at most 6/10 pain with daily activities. - Frequently MET                         7.) The patient will improve her cervical rotation to at least 35 degrees bilaterally to assist with daily activities. - MET                         1.) The patient will improve their FOTO score from 46 to at least 51 to show improvements in functional mobility.- MET (58 today)        RECOMMENDATIONS:  [x]Discontinue therapy: [x]Patient has reached or is progressing toward set goals      []Patient is non-compliant or has abdicated      []Due to lack of appreciable progress towards set goals      []Other    Rickie Fung, PT 4/6/2022

## 2022-04-06 NOTE — PROGRESS NOTES
PT DAILY TREATMENT NOTE 2-15    Patient Name: Jossy hTomas  Date:2022  : 1960  [x]  Patient  Verified  Payor: Carolina Rivera / Plan:  [facility] [address] / Product Type: HMO /    In time: 4:01pm  Out time: 4:55pm  Total Treatment Time (min): 47  Visit #:  4    Treatment Area: Cervical disc disorder with radiculopathy, unspecified cervical region [M50.10]    SUBJECTIVE  Pain Level (0-10 scale): 3.5  Any medication changes, allergies to medications, adverse drug reactions, diagnosis change, or new procedure performed?: [x] No    [] Yes (see summary sheet for update)  Subjective functional status/changes:   [] No changes reported  The patient reports that she's been doing better since being on short term disability and not at work this week (4 work days total). She does feel like the heat/IFC helped into the night after last time. OBJECTIVE    Modality rationale: decrease edema, decrease inflammation, decrease pain and increase tissue extensibility to improve the patients ability to perform daily activities.     Min Type Additional Details      15 [x] Estim: []Att   [x]Unatt    []TENS instruct                  [x]IFC  []Premod   []NMES                     []Other:  []w/US   []w/ice   [x]w/heat  Position: supine  Location: cervical     []  Traction: [] Cervical       []Lumbar                       [] Prone          []Supine                       []Intermittent   []Continuous Lbs:  [] before manual  [] after manual  []w/heat    []  Ultrasound: []Continuous   [] Pulsed                       at: []1MHz   []3MHz Location:  W/cm2:    [] Paraffin         Location:   []w/heat    []  Ice     []  Heat  []  Ice massage Position:  Location:    []  Laser  []  Other: Position:  Location:      []  Vasopneumatic Device Pressure:       [] lo [] med [] hi   Temperature:      [x] Skin assessment post-treatment:  [x]intact []redness- no adverse reaction    []redness - adverse reaction:       39 min Therapeutic Exercise:  [x] See flow sheet :   Rationale: increase ROM, increase strength and improve coordination to improve the patients ability to perform daily activities. With   [x] TE   [] TA   [] Neuro   [] SC   [] other: Patient Education: [x] Review HEP    [x] Progressed/Changed HEP based on:   [x] positioning   [x] body mechanics   [] transfers   [] heat/ice application    [] other:      Other Objective/Functional Measures: FOTO= 58 (46 at eval)     Pain Level (0-10 scale) post treatment: 1.5    ASSESSMENT/Changes in Function:   The patient has progressed well and is independent with her HEP and will be discharged today. []  See Plan of Care  []  See progress note/recertification  [x]  See Discharge Summary         Progress towards goals / Updated goals: The patient has MET or is progressing towards goals.      PLAN  []  Upgrade activities as tolerated     []  Continue plan of care  []  Update interventions per flow sheet       [x]  Discharge due to:   []  Other:_      Burton Low, PT 4/6/2022

## 2022-04-11 ENCOUNTER — TELEPHONE (OUTPATIENT)
Dept: NEUROLOGY | Age: 62
End: 2022-04-11

## 2022-04-11 NOTE — LETTER
4/14/2022 11:10 AM    [address] 65  [place] [id number]    Office Visit  3/28/2022  Sean Gaitan   MRN: [id number]     CSN:   [id number]       Reason for Visit    New Patient Referred by Elaine Samuel for pain. Complain of neck pain since October. Currently in PT. XRay completed and diagnosed with degenerative disc disease.    Reason for Visit History       Diagnoses     Codes Comments   Cervical radiculopathy due to degenerative joint disease of spine  - Primary ICD-10-CM: M47.22   ICD-9-CM: 721.0    Cervicogenic headache  ICD-10-CM: N82.69   ICD-9-CM: 784.0           All Charges for This Encounter    Code Description Service Date Service Provider Modifiers Qty   [address] [place] OFFICE/OUTPATIENT NEW MODERATE MDM 45-59 MINUTES 3/28/2022 Phil Zambrano MD  1       Encounter Information    Encounter Information    Provider Department Encounter #   3/28/2022 Marcello Negron [facility] AT [facility] [id number]                Sincerely,      Rodrigo Brandt MD

## 2022-04-11 NOTE — TELEPHONE ENCOUNTER
Pt needs an itemized bill or HCFA-1500 for her STD. Needs something with \"physicians dx for that DOS\" Billing has said they can't provide anything because it hasn't been processed by ins yet.  Please call to discuss what we can email to pt.  [phone]

## 2022-04-11 NOTE — LETTER
4/14/2022 11:07 AM    Ms. Jeannette Wagner  [address]  [address] [id number]            Office Visit  3/28/2022  Jeannette Wagner   MRN: [id number]     CSN:   [id number]       Reason for Visit    New Patient Referred by Daisy Vargas for pain. Complain of neck pain since October. Currently in PT. XRay completed and diagnosed with degenerative disc disease. Reason for Visit History       Diagnoses     Codes Comments   Cervical radiculopathy due to degenerative joint disease of spine  - Primary ICD-10-CM: M47.22   ICD-9-CM: 721.0    Cervicogenic headache  ICD-10-CM: G44.86   ICD-9-CM: 084. 0                Progress Notes      Sofya Sheppard MD at 03/28/22 1300    Status: Signed      Consult  REFERRED BY:  Joey Hale MD     CHIEF COMPLAINT: Increasing neck pain ever since she had a Covid vaccination in October 2021        Subjective:      Jeannette Wagner is a 62 y.o. right-handed  female, seen as a new patient to me, at the request of Dr. Rosio Robertson of new problem of severe headache and neck pain that began after she received a Covid vaccination in October 2021, and had severe headaches and neck pain ever since, and feels that mainly her problems are really her neck pain and the headaches come from the neck. She has them almost every day or every other day, and feel that she is getting increasingly incapacitated from the pain. She feels that if she could just stay out of work for a few weeks and let her neck rest, because she is constantly raising and lowering her head to follow the computer screen next aggravating her neck. 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   She has Sjogren's syndrome and chronic pain from that and sees a rheumatologist.  She has a positive SALVADOR and a centromere B antibody positive Raynaud's syndrome in addition and has seen Dr. Kristian Hendrix. She does not appear to be on any active rheumatoid treatment at this time. She was given amitriptyline 10 mg at nighttime for her headaches and neck pain and it did help some, but she still does not sleep through the night has the pain, we will increase that to 20 mg at night. She cannot take too many nonsteroidals, we will try low-dose Celebrex 100 mg dose to see if that will help some of the arthritis in the neck, and gave her Zanaflex to try 2 mg 1-3 times a day as needed and tolerated for muscle relaxant because her neck is still stiff. We may need to get an MRI of the neck, and we did order that because she has tried physical therapy and has not gotten any better and has so much pain now that she has to go on disability and is having increasing numbness radiating into her arms, in the C5-6 distribution, and did get better with the Medrol, so hopefully she will get better with an anti-inflammatory. She denies any past history of trauma to the neck, or injury, and denies any history of head problems, and denies any weakness in the arms, but feels at times that her hands and arms are weak and she cannot open things or  things like she used to. Her bowel bladder function remain stable she does not have much back pain and has no cranial nerve symptoms. She had no fever or meningismus. Her past medical history.   For anxiety and depression, foot fracture, hyperlipidemia, hypothyroidism, interstitial cystitis, kidney stones, and basal cell cancer and allergies and anxiety and thyroid disease.          Past Medical History:   Diagnosis Date    Anxiety      Asthma 2008     allergy induced [place] [address] [facility])       basal cell 20 years ago upper abdomen    Foot fracture      Gall stones      Hyperlipidemia      Hypothyroidism      Interstitial cystitis        [facility] Interstitial cystitis      Kidney stones      Menopausal disorder      Other ill-defined conditions(799.89)       kidney stones in pass    PONV (postoperative nausea and vomiting)       severe nausea 1x in the past    Psychiatric disorder 11/10     anxiety attack    Thyroid disease              Past Surgical History:   Procedure Laterality Date    HX ADENOIDECTOMY       HX CHOLECYSTECTOMY        HX DILATION AND CURETTAGE         miscarriage x1    HX ENDOSCOPY        HX GI   2010     cholecystectomy    HX GYN         laparoscopy for fertility    HX OTHER SURGICAL   2006     hemithyroidectomy right, gall bladder removal    HX TONSILLECTOMY       ID BREAST SURGERY PROCEDURE UNLISTED         left breast lumpectomy            Family History   Problem Relation Age of Onset    Cancer Mother      Heart Disease Father      Heart Disease Brother        Social History            Tobacco Use    Smoking status: Former Smoker       Packs/day: 1.00       Years: 12.00       Pack years: 12.00       Types: Cigarettes       Quit date: 1990       Years since quittin.1    Smokeless tobacco: Never Used   Substance Use Topics    Alcohol use: Not Currently       Comment: rarely          Current Outpatient Medications:     meloxicam (MOBIC) 7.5 mg tablet, Take 1 Tablet by mouth daily. , Disp: 30 Tablet, Rfl: 5    tiZANidine (ZANAFLEX) 2 mg tablet, Take 1 Tablet by mouth three (3) times daily as needed for Muscle Spasm(s). , Disp: 100 Tablet, Rfl: 5    LORazepam (ATIVAN) 0.5 mg tablet, 1 to 2 tablets p.o. 1 to 2 hours prior to MRI scan and during scan as needed, Disp: 5 Tablet, Rfl: 0    magnesium 250 mg tab, Take  by mouth., Disp: , Rfl:     zinc sulfate (ZINC-15 PO), Take  by mouth., Disp: , Rfl:     cholecalciferol (Vitamin D3) (5000 Units/125 mcg) tab tablet, Take 5,000 Units by mouth daily. , Disp: , Rfl:     amitriptyline (ELAVIL) 10 mg tablet, Take 2 Tablets by mouth nightly., Disp: 60 Tablet, Rfl: 5    acetaminophen (TylenoL) 325 mg tablet, Take  by mouth every four (4) hours as needed for Pain., Disp: , Rfl:     montelukast (SINGULAIR) 10 mg tablet, TAKE 1 TABLET BY MOUTH EVERY DAY, Disp: 30 Tablet, Rfl: 1    Omeprazole delayed release (PRILOSEC D/R) 20 mg tablet, Take 20 mg by mouth two (2) times a day., Disp: , Rfl:     albuterol (PROVENTIL HFA, VENTOLIN HFA, PROAIR HFA) 90 mcg/actuation inhaler, Take 1 Puff by inhalation every six (6) hours as needed for Wheezing., Disp: 1 Inhaler, Rfl: 2    [place] Thyroid 90 mg tablet, TAKE 1 TABLET BY MOUTH  DAILY, Disp: 90 Tab, Rfl: 3    cyanocobalamin (VITAMIN B-12) 1,000 mcg tablet, Take 5,000 mcg by mouth daily. , Disp: , Rfl:     estradiol-norethindrone (COMBIPATCH) 0.05-0.25 mg/24 hr, 1 Patch by TransDERmal route two (2) times a week., Disp: , Rfl:     NIFEdipine ER (ADALAT CC) 30 mg ER tablet, Take 1 Tablet by mouth daily.  Indications: high blood pressure, Raynaud's phenomenon, a condition where blood vessels constrict too much with coldness or stress, Disp: 30 Tablet, Rfl: 1                 Allergies   Allergen Reactions    Adhesive Other (comments)       Eat skin off    Codeine Anaphylaxis    Codeine Sulfate Anaphylaxis    Darvon [Propoxyphene] Itching    Percocet [Oxycodone-Acetaminophen] Itching    Sulfa (Sulfonamide Antibiotics) Hives    Vicodin [Hydrocodone-Acetaminophen] Itching    Avocado Swelling    Demerol [Meperidine] Itching    Dilaudid [Hydromorphone] Itching    Lexapro [Escitalopram] Other (comments)       shake    Sulfamethoxazole-Trimethoprim Rash      MRI Results (most recent):  Results from Hospital Encounter encounter on 10/25/21     MRI BRAIN W WO CONT     Narrative  EXAM:  MRI BRAIN W WO CONT     INDICATION:    Acute intractable headache.     COMPARISON:  None.     CONTRAST: 15 ml ProHance.     TECHNIQUE:  Multiplanar multisequence acquisition without and with contrast of the brain.     FINDINGS:  The ventricles are normal in size and position. The brain parenchyma has normal  signal characteristics for age. There is no acute infarct, hemorrhage,  extra-axial fluid collection, or mass effect. There is no cerebellar tonsillar  herniation. Expected arterial flow-voids are present. No evidence of abnormal  enhancement.     The paranasal sinuses, mastoid air cells, and middle ears are clear. The orbital  contents are within normal limits. There is a prominent osteoma noted along the  left anterior frontal convexity arising from the inner table of the left frontal  bone measuring 2.5 x 1.0 x 1.4 cm.     Impression  1. No significant intracranial abnormality. Incidental prominent osteoma along  the left anterior frontal convexity arising from the inner table of the left  frontal bone measuring 2.5 x 1.0 x 1.4 cm.        Results from Hospital Encounter encounter on 10/25/21     MRI BRAIN W WO CONT     Narrative  EXAM:  MRI BRAIN W WO CONT     INDICATION:    Acute intractable headache.     COMPARISON:  None.     CONTRAST: 15 ml ProHance.     TECHNIQUE:  Multiplanar multisequence acquisition without and with contrast of the brain.     FINDINGS:  The ventricles are normal in size and position. The brain parenchyma has normal  signal characteristics for age. There is no acute infarct, hemorrhage,  extra-axial fluid collection, or mass effect. There is no cerebellar tonsillar  herniation. Expected arterial flow-voids are present. No evidence of abnormal  enhancement.     The paranasal sinuses, mastoid air cells, and middle ears are clear. The orbital  contents are within normal limits. There is a prominent osteoma noted along the  left anterior frontal convexity arising from the inner table of the left frontal  bone measuring 2.5 x 1.0 x 1.4 cm.     Impression  1. No significant intracranial abnormality.  Incidental prominent osteoma along  the left anterior frontal convexity arising from the inner table of the left  frontal bone measuring 2.5 x 1.0 x 1.4 cm.     Review of Systems:  A comprehensive review of systems was negative except for: Constitutional: positive for fatigue and malaise  Musculoskeletal: positive for myalgias, arthralgias, stiff joints, neck pain and muscle weakness  Neurological: positive for headaches, paresthesia, weakness and Neck pain  Behvioral/Psych: positive for anxiety and depression        Vitals:     03/28/22 1305 03/28/22 1330   BP: (!) 160/80 (!) 144/86   Pulse: 82     Resp: 18     Temp: 97.6 °F (36.4 °C)     TempSrc: Temporal     SpO2: 97%     Weight: 169 lb (76.7 kg)        Objective:      I        NEUROLOGICAL EXAM:     Appearance: The patient is well developed, well nourished, mildly overweight, provides a coherent history and is in no acute distress. Mental Status: Oriented to time, place and person, and the president, cognitive function is normal and speech is fluent and no aphasia or dysarthria. Mood and affect appropriate, and patient very anxious. Cranial Nerves:   Intact visual fields. Fundi are benign, disc are flat, no lesions seen on funduscopy. ALYSON, EOM's full, no nystagmus, no ptosis. Facial sensation is normal. Corneal reflexes are not tested. Facial movement is symmetric. Hearing is normal bilaterally. Palate is midline with normal sternocleidomastoid and trapezius muscles are normal. Tongue is midline. Neck without meningismus or bruits, but patient has marked tightness in her muscles and limited range of motion in all directions, particularly in hyperextension. Temporal arteries are not tender or enlarged  TMJ areas are not tender on palpation   Motor:  5/5 strength in upper and lower proximal and distal muscles. Normal bulk and tone. No fasciculations.   Rapid alternating movement is symmetric and intact bilaterally   Reflexes:   Deep tendon reflexes 2+/4 and symmetrical.  No babinski or clonus present   Sensory:   Normal to touch, pinprick and vibration and temperature. DSS is intact   Gait:  Normal gait for patient's age. Tremor:   No tremor noted. Cerebellar:  No abnormal cerebellar signs present on Romberg and tandem testing and finger-nose-finger exam.   Neurovascular:  Normal heart sounds and regular rhythm, peripheral pulses intact, and no carotid bruits.               Assessment:          ICD-10-CM ICD-9-CM     1. Cervical radiculopathy due to degenerative joint disease of spine  M47.22 721.0 meloxicam (MOBIC) 7.5 mg tablet         tiZANidine (ZANAFLEX) 2 mg tablet         MRI CERV SPINE WO CONT         LORazepam (ATIVAN) 0.5 mg tablet         EMG LIMITED   2. Cervicogenic headache  G44.86 784.0 meloxicam (MOBIC) 7.5 mg tablet         tiZANidine (ZANAFLEX) 2 mg tablet         MRI CERV SPINE WO CONT         LORazepam (ATIVAN) 0.5 mg tablet         EMG LIMITED      Active Problems:    * No active hospital problems. *        Plan:      Patient with severe neck pain, seemingly in the C5-6 distribution into her arms, associated with some weakness and numbness, and failure to respond to physical therapy, or even steroids completely, we will check an MRI of the neck to rule out herniated disc or cervical spinal stenosis, and check an EMG study of both arms. She is also to get some disability forms, we will agree to give her 1 month out of work to see if that will help her neck letter neck rest, while she continues to try physical therapy given that was been ineffective in the past and the traction they gave her made her much worse. She will try low-dose Celebrex and muscle relaxant Zanaflex will increase the amitriptyline to 20 mg at night in addition to try to help her pain. She does not get better she may need to see Dr. Mejia Cleaves one of the pain management physicians to try to help her if she does not have surgical disease.   Further treatment evaluation will depend on the results of her clinical course and response to therapy as above, she will check my chart for test results and she was given Ativan to help her get the MRI because she is claustrophobic. 55 minutes met the patient, going over all her problems, going over her previous x-rays, discussing her diagnosis prognosis and treatment options, even discussing her disability for short-term disability for 1 month just to see if she will get better, and did advise her to try to continue physical therapy even though initially made her worse with traction she can just keep the traction. We reviewed all her x-rays in detail, nonacute interpretations as given and that she probably has the left osteoma in the frontal region and probably 1 that was missed in the high right parietal parasylvian region and these look benign and asymptomatic     Signed By: Sofia Bah MD      March 28, 2022       CC: Amos Sanders MD  FAX: [phone]               Vitals  Most recent update: 3/28/2022  1:31 PM  BP   144/86 Abnormal         Pulse   82       Temp   97.6 °F (36.4 °C) (Temporal)       Resp   18       Wt   169 lb (76.7 kg)         LMP   12/23/2011    SpO2   97%    BMI   33.01 kg/m²    OB Status   Postmenopausal    Smoking Status   Former Smoker          Vitals History   Pain Information (Last Filed)    Score Location Comments Edu?     8 Neck None None        BMI and BSA Data    Body Mass Index: 33.01 kg/m² Body Surface Area: 1.8 m²      Travel Screening     Question  Response   In the last 10 days, have you been in contact with someone who was confirmed or suspected to have Coronavirus/COVID-19? No / Unsure   Have you had a COVID-19 viral test in the last 10 days? No   Do you have any of the following new or worsening symptoms? None of these   Have you traveled internationally or domestically in the last month?   No     Travel History   Travel since 03/14/22   No documented travel since 03/14/22    Orders      Lab and Imaging Orders       Active    MRI CERV SPINE WO CONT Ordered On: 03/28/2022      Other Orders       Active    EMG LIMITED Ordered On: 03/28/2022      Medications      Medication Review Info      Reviewed by Josseline Griffith MD (Physician) on 03/28/22 at  1:32 PM        Reviewed by Josseline Griffith MD (Physician) on 03/28/22 at  1:32 PM          Ordered Medications     Disp Refills Start End   meloxicam (MOBIC) 7.5 mg tablet 30 Tablet 5 3/28/2022    Take 1 Tablet by mouth daily. - Oral   Pharmacy: [id number] - [address] AT [place] [address] Ph #: [phone]      tiZANidine (ZANAFLEX) 2 mg tablet 100 Tablet 5 3/28/2022    Take 1 Tablet by mouth three (3) times daily as needed for Muscle Spasm(s). - Oral   Pharmacy: [place] [id number] #[id number] - [address] AT [place] [address] Ph #: [phone]      LORazepam (ATIVAN) 0.5 mg tablet 5 Tablet 0 3/28/2022    1 to 2 tablets p.o. 1 to 2 hours prior to MRI scan and during scan as needed   Pharmacy: [address] [place] [address] Ph #: [phone]        Questionnaires    No completed forms available for this encounter.      Encounter Messages  Expand All  Collapse All    History    From   Cahi William To   Heather Boykin MD Sent   3/25/2022  7:52 PM   History questionnaire submitted on Friday March 25, 2022 at 7:52:11 PM  Questionnaire: History  Patient: Chai William [[id number]]        Medical History:     Question: Cancer  Response: Yes  Date: 1995 Skin  Comments:      Question: Developmental delay  Response: No Response  Date:   Comments:      Question: Diabetes  Response: No Response  Date:   Comments:      Question: Migraines or severe headaches  Response: No Response  Date:   Comments:      Question: Hearing problems  Response: No Response  Date:   Comments:      Question: High blood pressure  Response: No Response  Date:   Comments:      Question: HIV/AIDS  Response: No Response  Date:   Comments:      Question: Memory loss  Response: No Response  Date:   Comments:      Question: Movement disorder  Response: No Response  Date:   Comments:      Question: Multiple Sclerosis  Response: No Response  Date:   Comments:      Question: Neuropathy  Response: No Response  Date:   Comments:      Question: Seizures  Response: No Response  Date:   Comments:      Question: Stroke  Response: No Response  Date:   Comments:      Question: Syncope  Response: No Response  Date:   Comments:      Question: Swallowing disorder  Response: No Response  Date:   Comments:      Question: Migraines  Response: No Response  Date:   Comments:            Social History:     Question: Do you drink alcohol? Response: Not Currently  Drinks/Week: 0 Glasses of wine, 0 Cans of beer, 0   Shots of liquor, 0 Drinks containing 0.5 oz of alcohol  Comments:      Question: Do you smoke cigarettes or cigars? Response: Former Smoker  How many packs per day do you smoke?: 1  How many years have you been smoking?: 12  If you quit smoking, when did you quit?: 2/12/1990  Comments:      Question: Drugs of Abuse  Response: Never     Question: Are you sexually active? Response: Yes  Partners: Male  Birth-Control/Protection: Male  Comments:            Family History:     Problem: Heart Disease     Relation: Father  Name: Father  Comments: Afib     Relation: Brother  Name: Brother  Comments: Afib     Problem: Cancer     Relation: Mother  Name: Mother  Comments: Liver/Bone     Relation: Father  Name: Father  Comments: Melanoma     Relation: Brother  Name: Brother  Comments: Lung/Liver/Brain        Questionnaire Submission    From   Vu Bonilla To   Sofia Bha MD Sent   3/25/2022  7:50 PM   Patient Questionnaire Submission  --------------------------------     Questionnaire: New Patient Questions     ~~~~~~~~~~~~~~~~~~~~~~~~~~~~~~~~  In the past 12 months, have you experienced any of the conditions below? Question: Difficulty Walking? Answer: Yes         Question: Memory Problems? Answer: No         Question: Weakness? Answer: Yes         Question: Frequent Falls? Answer: No         Question: Tremors? Answer: No         Question: Numbness? Answer: Yes         Question: Incontinence of Bladder? Answer: No         Question: Incontinence of Bowels? Answer: No         Question: Double Vision? Answer: No         Question: Blurry Vision? Answer: No           Question: Have you had any conservative treatment (PT or injections)? Answer: Yes     Question: Please upload an image of your Drivers License. Answer:        Questionnaire: E-Cigarette and Vaping History     Question: Which option best describes your E-cigarette/Vaping usage? Answer:   Never Used            Follow-up and Dispositions    0 Return in about 3 months (around 6/28/2022). Follow-up and Disposition History   Level of Service    Level of Service   [place] OFFICE CONSULTATION NEW/ESTAB PATIENT 60 MIN [id number]        Patient Instructions    None    AVS Reports    Date/Time Report Action User   3/28/2022  1:50 PM To Take With You Printed Phil Zambrano MD   3/28/2022  1:50 PM To Take With You Printed Phil Zambrano MD       Chart Review Routing History    No routing history on file. Communication Routing History    None        Encounter-Level Documents: There are no encounter-level documents.           Sincerely,      Rodrigo Brandt MD

## 2022-04-14 NOTE — TELEPHONE ENCOUNTER
Pt is frustrated with the lack of ressponse. Needs the DX code we are using to submit to [facility] for Std benefits. Billing will not give her anything until claim is processed by primary ins co, which could take at least 30 days. Pt is needing the money from her Std benefits now but can't get anything without this code. Please call to discuss.  [phone]

## 2022-04-14 NOTE — TELEPHONE ENCOUNTER
I called the patient and notified her that I can copy the parts of the office note that include the diagnosis codes and the code used for billing which I did. I have made these in communications. I sent a [email or web address] message with this information, but will also attach the letter itself when it is scanned into the chart just in case.  She will see if this works and let me know if there are problems

## 2022-04-15 ENCOUNTER — HOSPITAL ENCOUNTER (OUTPATIENT)
Dept: MRI IMAGING | Age: 62
Discharge: HOME | End: 2022-04-15
Attending: PSYCHIATRY & NEUROLOGY
Payer: COMMERCIAL

## 2022-04-15 DIAGNOSIS — G44.86 CERVICOGENIC HEADACHE: ICD-10-CM

## 2022-04-15 DIAGNOSIS — M47.22 CERVICAL RADICULOPATHY DUE TO DEGENERATIVE JOINT DISEASE OF SPINE: ICD-10-CM

## 2022-04-15 PROCEDURE — 72141 MRI NECK SPINE W/O DYE: CPT

## 2022-04-16 ENCOUNTER — DOCUMENTATION ONLY (OUTPATIENT)
Dept: NEUROLOGY | Age: 62
End: 2022-04-16

## 2022-04-16 NOTE — PROGRESS NOTES
Patient MRI scan does not show clear surgical disease, and we are awaiting the EMG to decide on what to do next, she does have moderate disease we will see what the EMG shows

## 2022-04-17 ENCOUNTER — PATIENT MESSAGE (OUTPATIENT)
Dept: INTERNAL MEDICINE CLINIC | Age: 62
End: 2022-04-17

## 2022-04-17 DIAGNOSIS — E04.1 THYROID NODULE: Primary | ICD-10-CM

## 2022-04-17 DIAGNOSIS — E03.4 THYROID ATROPHY: ICD-10-CM

## 2022-04-18 NOTE — TELEPHONE ENCOUNTER
Gilda Sanchez 4/18/2022 7:39 AM EDT      ----- Message -----  From: Marquis Matta  Sent: 4/17/2022 5:58 PM EDT  To: [facility]  Subject: MRI - Thyroid     Hi Dr Leigh Ann Lopez  I had my MRI on Friday that was ordered by Dr Kamron Leon for my cervical spine. The results are posted and wondered if you could look at it and explain the comment about my Thyroid. What does it mean? Thank you!

## 2022-04-20 ENCOUNTER — TELEPHONE (OUTPATIENT)
Dept: INTERNAL MEDICINE CLINIC | Age: 62
End: 2022-04-20

## 2022-04-20 DIAGNOSIS — E04.1 THYROID NODULE: Primary | ICD-10-CM

## 2022-04-20 DIAGNOSIS — E03.4 THYROID ATROPHY: ICD-10-CM

## 2022-04-20 NOTE — TELEPHONE ENCOUNTER
Lab Alina did not receive the lab order for the thyroid anti body test.      Please fax right away to fax #[phone]    Thanks.

## 2022-04-21 LAB
THYROGLOB AB SERPL-ACNC: <1 IU/ML (ref 0–0.9)
THYROPEROXIDASE AB SERPL-ACNC: <8 IU/ML (ref 0–34)

## 2022-04-25 ENCOUNTER — OFFICE VISIT (OUTPATIENT)
Dept: INTERNAL MEDICINE CLINIC | Age: 62
End: 2022-04-25
Payer: COMMERCIAL

## 2022-04-25 VITALS
SYSTOLIC BLOOD PRESSURE: 111 MMHG | OXYGEN SATURATION: 99 % | HEART RATE: 91 BPM | WEIGHT: 174 LBS | HEIGHT: 60 IN | BODY MASS INDEX: 34.16 KG/M2 | DIASTOLIC BLOOD PRESSURE: 70 MMHG | RESPIRATION RATE: 16 BRPM | TEMPERATURE: 97.6 F

## 2022-04-25 DIAGNOSIS — M50.10 CERVICAL RADICULOPATHY DUE TO INTERVERTEBRAL DISC DISORDER: ICD-10-CM

## 2022-04-25 DIAGNOSIS — E89.0 POSTOPERATIVE HYPOTHYROIDISM: ICD-10-CM

## 2022-04-25 DIAGNOSIS — I73.00 RAYNAUD'S DISEASE WITHOUT GANGRENE: ICD-10-CM

## 2022-04-25 DIAGNOSIS — M34.9 SCLERODERMA (HCC): ICD-10-CM

## 2022-04-25 DIAGNOSIS — E03.4 THYROID ATROPHY: ICD-10-CM

## 2022-04-25 DIAGNOSIS — I10 PRIMARY HYPERTENSION: ICD-10-CM

## 2022-04-25 DIAGNOSIS — M48.02 SPINAL STENOSIS OF CERVICAL REGION: Primary | ICD-10-CM

## 2022-04-25 PROCEDURE — 99214 OFFICE O/P EST MOD 30 MIN: CPT | Performed by: INTERNAL MEDICINE

## 2022-04-25 RX ORDER — AMITRIPTYLINE HYDROCHLORIDE 10 MG/1
10 TABLET, FILM COATED ORAL
Qty: 60 TABLET | Refills: 5
Start: 2022-04-25 | End: 2022-07-28 | Stop reason: ALTCHOICE

## 2022-04-25 RX ORDER — NIFEDIPINE 30 MG/1
30 TABLET, FILM COATED, EXTENDED RELEASE ORAL DAILY
Qty: 90 TABLET | Refills: 1 | Status: SHIPPED | OUTPATIENT
Start: 2022-04-25 | End: 2022-06-23

## 2022-04-25 NOTE — LETTER
4/25/2022 11:44 AM    Ms. Carrie [address]NE[address] [id number]              Please fax us the most recent Mammogram so that we may update the patient's records for continuity of care.      Our fax number: [phone]    Patient:   Chai William  1960                  Sincerely,      Paradise Starr MD

## 2022-04-25 NOTE — PATIENT INSTRUCTIONS
Mammogram at [facility]    Make appointment with Dr Mami Mcfarlane for neck spinal stenosis     Blood pressure is excellent

## 2022-04-25 NOTE — PROGRESS NOTES
Ms. Charisma Morgan is presenting to follow up     CC:  Follow-up and Hypertension       HPI:      Primary hypertension  - new diagnosis. Since patient has #3 Raynauds choosing an agent that treats that as well. She did not tolerate amlodipine  - NIFEdipine ER (ADALAT CC) 30 mg ER tablet started and doing well. Denies chest pain shortness of breath  Minimal swelling in hands and feet. Raynaud's resolved    Blood pressure today is 111/70    Headache - patient was seen Early October for HA   Onset after Moderna vaccination   Work up included MRI brain which was normal followed by cervical x ray which showed degenerative disc disease C5- C6    Neck pain present and patient had an MRI of the neck ordered by Dr. Lico Rios  The MRI showed moderate canal stenosis and foraminal stenosis at C5-C6. She is taking tizanidine Elavil and Mobic  She generally feels her arms are weaker than previous  \" It takes everything I have to blow dry hair\"     anxiety:since has been off of xanax anxiety is much better. Anxiety from time to time. Recall   Positive SALVADOR and centromere B Ab/positive Raynaud's-noted some skin changes. Patient saw Dr. Jannie Belle. She will establish with new rheumatologist since Dr. Jannie Belle left  Normal echocardiogram without evidence of pulmonary HTN   She has an EGD on 6/22/2021 which showed GAVE. PFT on 4/02/2021 did not show restrictive disease. Echocardiogram on 4/02/2021 did not show pulmonary hypertension. Her PFTs showed   1. Very mild airflow obstruction. 2.  No restrictive lung disease. 3.  Air trapping is present. 4.  Normal DLCO. 5.  Normal flow-volume loop. CT chest 09/01   Minimal linear scarring at the lung bases. Smoked for very short times in her 25s  less then 10 pack years  Normal stress test in 2018         Up to date mammogram at [place] [facility] and pap smear  Current weight is 170lbs    Hx of hypothyroidism : on armour thyroid and doing well.  Recent TSH in range  No hair loss, or constipation or brittle nails      Review of systems:  Constitutional: negative for fever, chills, weight loss, night sweats   10 systems reviewed and negative other than HPI    Past Medical History:   Diagnosis Date    Anxiety     Asthma 2008    allergy induced in [address] ([facility] [address]     basal cell 20 years ago upper abdomen    Foot fracture     Gall stones     Hyperlipidemia     Hypothyroidism     Interstitial cystitis     [facility] Maryan [facility] at [facility] Interstitial cystitis     Kidney stones     Menopausal disorder     Other ill-defined conditions(139.15)     kidney stones in pass    PONV (postoperative nausea and vomiting)     severe nausea 1x in the past    Psychiatric disorder 11/10    anxiety attack    Thyroid disease         Past Surgical History:   Procedure Laterality Date    HX ADENOIDECTOMY  1977    HX CHOLECYSTECTOMY      HX DILATION AND CURETTAGE      miscarriage x1    HX ENDOSCOPY      HX GI  2010    cholecystectomy    HX GYN      laparoscopy for fertility    HX OTHER SURGICAL  2006    hemithyroidectomy right, gall bladder removal    HX TONSILLECTOMY  1977    DE BREAST SURGERY PROCEDURE UNLISTED      left breast lumpectomy       Allergies   Allergen Reactions    Adhesive Other (comments)     Eat skin off    Codeine Anaphylaxis    Codeine Sulfate Anaphylaxis    Darvon [Propoxyphene] Itching    Percocet [Oxycodone-Acetaminophen] Itching    Sulfa (Sulfonamide Antibiotics) Hives    Vicodin [Hydrocodone-Acetaminophen] Itching    Avocado Swelling    Demerol [Meperidine] Itching    Dilaudid [Hydromorphone] Itching    Lexapro [Escitalopram] Other (comments)     shake    Sulfamethoxazole-Trimethoprim Rash       Current Outpatient Medications on File Prior to Visit   Medication Sig Dispense Refill    meloxicam (MOBIC) 7.5 mg tablet Take 1 Tablet by mouth daily. 30 Tablet 5    tiZANidine (ZANAFLEX) 2 mg tablet Take 1 Tablet by mouth three (3) times daily as needed for Muscle Spasm(s).  100 Tablet 5    NIFEdipine ER (ADALAT CC) 30 mg ER tablet Take 1 Tablet by mouth daily. Indications: high blood pressure, Raynaud's phenomenon, a condition where blood vessels constrict too much with coldness or stress 30 Tablet 1    zinc sulfate (ZINC-15 PO) Take  by mouth.  cholecalciferol (Vitamin D3) (5000 Units/125 mcg) tab tablet Take 5,000 Units by mouth daily.  acetaminophen (TylenoL) 325 mg tablet Take  by mouth every four (4) hours as needed for Pain.  Omeprazole delayed release (PRILOSEC D/R) 20 mg tablet Take 20 mg by mouth two (2) times a day.  albuterol (PROVENTIL HFA, VENTOLIN HFA, PROAIR HFA) 90 mcg/actuation inhaler Take 1 Puff by inhalation every six (6) hours as needed for Wheezing. 1 Inhaler 2    cyanocobalamin (VITAMIN B-12) 1,000 mcg tablet Take 5,000 mcg by mouth daily.  estradiol-norethindrone (COMBIPATCH) 0.05-0.25 mg/24 hr 1 Patch by TransDERmal route two (2) times a week. No current facility-administered medications on file prior to visit. family history includes Cancer in her mother; Heart Disease in her brother and father. Social History     Socioeconomic History    Marital status:      Spouse name: Not on file    Number of children: Not on file    Years of education: Not on file    Highest education level: Not on file   Occupational History    Not on file   Tobacco Use    Smoking status: Former Smoker     Packs/day: 1.00     Years: 12.00     Pack years: 12.00     Types: Cigarettes     Quit date: 1990     Years since quittin.2    Smokeless tobacco: Never Used   Vaping Use    Vaping Use: Never used   Substance and Sexual Activity    Alcohol use: Not Currently     Comment: rarely    Drug use: Never    Sexual activity: Yes   Other Topics Concern    Not on file   Social History Narrative    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   Living with      Social Determinants of Health     Financial Resource Strain:     Difficulty of Paying Living Expenses: Not on file   Food Insecurity:     Worried About Running Out of Food in the Last Year: Not on file    Ran Out of Food in the Last Year: Not on file   Transportation Needs:     Lack of Transportation (Medical): Not on file    Lack of Transportation (Non-Medical): Not on file   Physical Activity:     Days of Exercise per Week: Not on file    Minutes of Exercise per Session: Not on file   Stress:     Feeling of Stress : Not on file   Social Connections:     Frequency of Communication with Friends and Family: Not on file    Frequency of Social Gatherings with Friends and Family: Not on file    Attends Buddhist Services: Not on file    Active Member of [address] [place] [address] [employer] or Organizations: Not on file    Attends Club or Organization Meetings: Not on file    Marital Status: Not on file   Intimate Partner Violence:     Fear of Current or Ex-Partner: Not on file    Emotionally Abused: Not on file    Physically Abused: Not on file    Sexually Abused: Not on file   Housing Stability:     Unable to Pay for Housing in the Last Year: Not on file    Number of [place] in the Last Year: Not on file    Unstable Housing in the Last Year: Not on file       Visit Vitals  /70   Pulse 91   Temp 97.6 °F (36.4 °C) (Temporal)   Resp 16   Ht 5' (1.524 m)   Wt 174 lb (78.9 kg)   LMP 12/23/2011   SpO2 99%   BMI 33.98 kg/m²     General:  Well appearing female no acute distress  HEENT:   PERRL,normal conjunctiva. Neck:  Supple. Thyroid normal size, nontender, without nodules. No carotid bruit. No masses or lymphadenopathy  Respiratory: no respiratory distress,  no wheezing, no rhonchi, no rales. No chest wall tenderness. Cardiovascular:  RRR, normal S1S2, no murmur. Gastrointestinal: normal bowel sounds, soft, nontender, without masses. No hepatosplenomegaly. Extremities +2 pulses, no edema, normal sensation   Musculoskeletal:  Normal gait. Normal digits and nails.   Normal strength and tone, no atrophy, and no abnormal movement. Skin:  No rash, no lesions, no ulcers. Neuro:  A and OX4, fluent speech, cranial nerves normal 2-12. Psych:  Normal affect      Lab Results   Component Value Date/Time    WBC 8.2 07/29/2021 07:58 AM    HGB 13.5 07/29/2021 07:58 AM    HCT 40.3 07/29/2021 07:58 AM    PLATELET 840 24/35/5466 07:58 AM    MCV 89 07/29/2021 07:58 AM     Lab Results   Component Value Date/Time    Sodium 138 02/10/2022 08:36 AM    Potassium 4.0 02/10/2022 08:36 AM    Chloride 102 02/10/2022 08:36 AM    CO2 23 02/10/2022 08:36 AM    Anion gap 7 03/22/2021 07:34 AM    Glucose 88 02/10/2022 08:36 AM    BUN 15 02/10/2022 08:36 AM    Creatinine 0.70 02/10/2022 08:36 AM    BUN/Creatinine ratio 21 02/10/2022 08:36 AM    GFR est  02/10/2022 08:36 AM    GFR est non-AA 94 02/10/2022 08:36 AM    Calcium 9.2 02/10/2022 08:36 AM     Lab Results   Component Value Date/Time    Cholesterol, total 196 02/10/2022 08:36 AM    HDL Cholesterol 59 02/10/2022 08:36 AM    LDL, calculated 123 (H) 02/10/2022 08:36 AM    LDL, calculated 113 (H) 12/31/2019 08:55 AM    VLDL, calculated 14 02/10/2022 08:36 AM    VLDL, calculated 11 12/31/2019 08:55 AM    Triglyceride 75 02/10/2022 08:36 AM     Lab Results   Component Value Date/Time    TSH 1.800 02/10/2022 08:36 AM     Lab Results   Component Value Date/Time    Hemoglobin A1c 5.1 06/10/2019 02:47 PM     Lab Results   Component Value Date/Time    VITAMIN D, 25-HYDROXY 46.0 12/31/2019 08:55 AM                   Assessment and Plan:     1. Spinal stenosis of cervical region  Patient will have EMG with Dr. Kaveh Rader. She continues on tizanidine, Elavil, &  - REFERRAL TO NEUROLOGY    2. Thyroid atrophy  Discussed this is due to history of hypothyroidism and surgery euthyroid patient is currently on [place] Thyroid    3. Postoperative hypothyroidism    4. Primary hypertension  Not well controlled on nifedipine    5.  Scleroderma [facility])  She will establish with new rheumatologist  Renal disease better on nifedipine     6. Cervical radiculopathy due to intervertebral disc disorder  - amitriptyline (ELAVIL) 10 mg tablet; Take 1 Tablet by mouth nightly. Dispense: 60 Tablet; Refill: 5      Follow-up and Dispositions    · Return in about 3 months (around 7/25/2022).           Chaz Trotter MD

## 2022-04-25 NOTE — PROGRESS NOTES
1. \"Have you been to the ER, urgent care clinic since your last visit? Hospitalized since your last visit? \" No    2. \"Have you seen or consulted any other health care providers outside of the [address] [place] [address] since your last visit? \" No     3. For patients aged 39-70: Has the patient had a colonoscopy / FIT/ Cologuard? Yes - no Care Gap present      If the patient is female:    4. For patients aged 41-77: Has the patient had a mammogram within the past 2 years? No      5. For patients aged 21-65: Has the patient had a pap smear?  NA - based on age or sex

## 2022-04-27 ENCOUNTER — OFFICE VISIT (OUTPATIENT)
Dept: RHEUMATOLOGY | Age: 62
End: 2022-04-27
Payer: COMMERCIAL

## 2022-04-27 VITALS
HEIGHT: 60 IN | WEIGHT: 172.4 LBS | SYSTOLIC BLOOD PRESSURE: 130 MMHG | DIASTOLIC BLOOD PRESSURE: 74 MMHG | TEMPERATURE: 98.2 F | HEART RATE: 90 BPM | RESPIRATION RATE: 18 BRPM | OXYGEN SATURATION: 95 % | BODY MASS INDEX: 33.85 KG/M2

## 2022-04-27 DIAGNOSIS — I73.00 RAYNAUD'S PHENOMENON WITHOUT GANGRENE: ICD-10-CM

## 2022-04-27 DIAGNOSIS — M34.9 LIMITED SYSTEMIC SCLEROSIS (HCC): Primary | ICD-10-CM

## 2022-04-27 DIAGNOSIS — M79.7 FIBROMYALGIA: ICD-10-CM

## 2022-04-27 PROCEDURE — 99215 OFFICE O/P EST HI 40 MIN: CPT | Performed by: INTERNAL MEDICINE

## 2022-04-27 NOTE — PATIENT INSTRUCTIONS
1. Let me know if your mouth ulcers worsen and I can prescribe you magic mouthwash for pain, or start Plaquenil (hydroxychloroquine) to reduce the frequency of recurrences. 2. Continue nifedipine daily for Raynaud's. Let me know if this worsens. 3. Labs in 6 months, before next visit. 4. Return in November. Call with leg swelling, increased shortness of breath, or worsened hand swelling in the meantime.

## 2022-04-27 NOTE — PROGRESS NOTES
Chief Complaint   Patient presents with    Joint Pain     \"everywhere\"     1. Have you been to the ER, urgent care clinic since your last visit? Hospitalized since your last visit? No    2. Have you seen or consulted any other health care providers outside of the [address] [place] [address] since your last visit? Include any pap smears or colon screening.  No

## 2022-04-27 NOTE — PROGRESS NOTES
REASON FOR VISIT    This is a follow-up visit for Ms. Woods for     ICD-10-CM   1. Limited systemic sclerosis (HCC)  M34.9     Therapy History includes:  Current DMARD therapy include: None  Prior DMARD therapy include: None  Discontinued DMARDs because of inefficacy: None  Discontinued DMARDs because of side effects: None    INTERVAL HISTORY    Continues to take omeprazole twice a day most days. Feels she chokes frequently, trying to avoid doing a barium swallow. Feels she can choke on her own saliva, water, or food. Puffy fingers and toes are her primary concern now. Joint pains are widespread. Hurts shoulder and outer hip hurt to sleep on the side. Nights are the worst times for pain. Sleeping is difficult as a result. Has difficulty lifting legs to put on socks. Mouth ulcers and painless blood blisters continue to emerge. Has mild dry eyes and mouth she doesn't feel is consistently present. Has been attributing this to seasonal allergies. No recent dental problems other than tooth cracking attributed to jaw clenching. Doesn't feel she needs eye drops consistently. Raynaud's has been a bit better. Had started amlodipine 2.5mg daily but was \"dealthy ill\" with this, PCP changed her to nifedipine ER 30mg which she has tolerated better. Still gets blanching of fingers but has been less painful. Has a space heater and hand warmer at work. Feeling better since she took a few weeks off of work to stay in warmer home. Has been having posterior neck pain, spends long days in front of the computer     Still gets winded with walking up a flight of stairs. April 2021 PFTs had shown just mild obstruction. .. she doesn't feel she is any more limited with exercise over the last year, and didn't want to repeat more recent order 2/2 high copays. While living in [place] she was more symptomatic with extrinsic asthma which responded well to Singulair. No longer uses Singulair or albuterol.     REVIEW OF SYSTEMS    A comprehensive review of systems was performed and pertinent results are documented in the HPI, review of systems is otherwise non-contributory. PAST MEDICAL HISTORY    She has a past medical history of Anxiety, Asthma (2008), [facility] Foot fracture, Gall stones, Hyperlipidemia, Hypothyroidism, Interstitial cystitis, Interstitial cystitis, Kidney stones, Menopausal disorder, Other ill-defined conditions(799.89), PONV (postoperative nausea and vomiting), Psychiatric disorder (11/10), and Thyroid disease. FAMILY HISTORY    Her family history includes Cancer in her mother; Heart Disease in her brother and father. SOCIAL HISTORY    She reports that she quit smoking about 32 years ago. Her smoking use included cigarettes. She has a 12.00 pack-year smoking history. She has never used smokeless tobacco. She reports previous alcohol use. She reports that she does not use drugs. MEDICATIONS     Current Outpatient Medications   Medication Sig    amitriptyline (ELAVIL) 10 mg tablet Take 1 Tablet by mouth nightly.  NIFEdipine ER (ADALAT CC) 30 mg ER tablet Take 1 Tablet by mouth daily. Indications: high blood pressure, Raynaud's phenomenon, a condition where blood vessels constrict too much with coldness or stress    meloxicam (MOBIC) 7.5 mg tablet Take 1 Tablet by mouth daily.  tiZANidine (ZANAFLEX) 2 mg tablet Take 1 Tablet by mouth three (3) times daily as needed for Muscle Spasm(s).  zinc sulfate (ZINC-15 PO) Take  by mouth.  cholecalciferol (Vitamin D3) (5000 Units/125 mcg) tab tablet Take 5,000 Units by mouth daily.  acetaminophen (TylenoL) 325 mg tablet Take  by mouth every four (4) hours as needed for Pain.  Omeprazole delayed release (PRILOSEC D/R) 20 mg tablet Take 20 mg by mouth two (2) times a day.  albuterol (PROVENTIL HFA, VENTOLIN HFA, PROAIR HFA) 90 mcg/actuation inhaler Take 1 Puff by inhalation every six (6) hours as needed for Wheezing.     cyanocobalamin (VITAMIN B-12) 1,000 mcg tablet Take 5,000 mcg by mouth daily.  estradiol-norethindrone (COMBIPATCH) 0.05-0.25 mg/24 hr 1 Patch by TransDERmal route two (2) times a week. No current facility-administered medications for this visit. ALLERGIES:     Allergies   Allergen Reactions    Adhesive Other (comments)     Eat skin off    Codeine Anaphylaxis    Codeine Sulfate Anaphylaxis    Darvon [Propoxyphene] Itching    Percocet [Oxycodone-Acetaminophen] Itching    Sulfa (Sulfonamide Antibiotics) Hives    Vicodin [Hydrocodone-Acetaminophen] Itching    Avocado Swelling    Demerol [Meperidine] Itching    Dilaudid [Hydromorphone] Itching    Lexapro [Escitalopram] Other (comments)     shake    Sulfamethoxazole-Trimethoprim Rash       PHYSICAL EXAMINATION    Visit Vitals  /74 (BP 1 Location: Left upper arm, BP Patient Position: Sitting)   Pulse 90   Temp 98.2 °F (36.8 °C) (Oral)   Resp 18   Ht 5' (1.524 m)   Wt 172 lb 6.4 oz (78.2 kg)   SpO2 95%   BMI 33.67 kg/m²     Body mass index is 33.67 kg/m². General:  The patient is well developed, well nourished, alert, and in no apparent distress. Eyes: Sclera are anicteric. No conjunctival injection. HEENT:  Oropharynx is clear. No oral ulcers. Adequate salivary pooling. No cervical or supraclavicular lymphadenopathy. Lungs:  Clear to auscultation bilaterally, without wheeze or stridor. Normal respiratory effort. Cor:  Regular rate and rhythm. No murmur rub or gallop. Abdomen: Soft, non-tender, without hepatomegaly or masses. Extremities: No calf tenderness or edema. Warm and well perfused. Skin:  Grossly abnormal nailfold capillaries. Puffy fingers. No william sclerodactyly. Neuro: Nonfocal, no foot or wrist drop. Musculoskeletal:    A comprehensive musculoskeletal exam was performed for all joints of each upper and lower extremity and assessed for swelling, tenderness and range of motion.  Results are documented as below:  No tendon friction rubs.  [place] nodes right hand  No evidence of synovitis in the small joints of the hands, wrists, shoulders, elbows, hips, knees or ankles. DATA REVIEW     Laboratory     Recent laboratory results were reviewed, summarized, and discussed with the patient. 2/10/22: Cr 0.70, LFTs WNL, HDL 59, ; TSH 1.8    Scleroderma Studies    Pulmonary Functions Testing    PFT 4/02/2021: FVC of 2.94 (112%), FEV1 of 1.75 (91%), FEV1/FVC of 60% and a DLCO of 19.9 (98%), TLC 5.52 (135%), VC 2.94 (112%). Echocardiogram    Echocardiogram 4/02/2021: LV: Estimated LVEF is 55 - 60%. Normal cavity size, wall thickness, systolic function (ejection fraction normal) and diastolic function. Pulmonary arterial systolic pressure (PASP) is 33 mmHg. Pulmonary hypertension not suggested by Doppler findings    Imaging    Musculoskeletal Ultrasound    None    Radiographs    Chest 3/22/2021: Cardiomediastinal silhouette is normal. Pulmonary vasculature is engorged. No focal parenchymal opacities, effusions, or pneumothorax. Bony thorax is intact. CT Imaging    CT Chest without contrast 9/01/2021: CHEST WALL: No mass or axillary lymphadenopathy. THYROID: No nodule. MEDIASTINUM: No mass or lymphadenopathy. EDISON: No mass or lymphadenopathy. THORACIC AORTA: No aneurysm. MAIN PULMONARY ARTERY: Normal in caliber. TRACHEA/BRONCHI: Patent. ESOPHAGUS: No wall thickening or dilatation. HEART: Normal in size. PLEURA: No effusion or pneumothorax. LUNGS: No nodule, minimal linear scarring at the lung bases. INCIDENTALLY IMAGED UPPER ABDOMEN: No significant abnormality in the incidentally imaged upper abdomen. BONES: No destructive bone lesion.     MR Imaging  4/15/22 MR Cspine:  IMPRESSION  1. Moderate spinal canal stenosis and severe bilateral neural foraminal stenosis at C5-C6. 2. Mild spinal canal stenosis and severe right neural foraminal stenosis at  C6-C7. 3. Remaining degenerative findings as detailed above.     10/25/21 MR brain w/wo, MRV brain:  No significant intracranial abnormality. Incidental prominent osteoma along the left anterior frontal convexity arising from the inner table of the left frontal bone measuring 2.5 x 1.0 x 1.4 cm. DXA     None    GI Studies    EGD 6/29/2021: Normal duodenum. Irregular Z-line in the gastroesophageal junction. Normal mucosa in the middle third of the esophagus. Granularity in the stomach body. Streaky erythema in the antrum compatible with GAVE. PATHOLOGY    Biopsy, antrum 6/29/2021: Reactive gastropathy. No evidence of gastritis. Helicobacter stain is negative. Biopsy, stomach body 6/29/2021: Unremarkable oxyntic mucosa. No evidence of gastritis or gastropathy. The Helicobacter stain is negative. Biopsy, esophagus, middle third 6/29/2021: Unremarkable squamous mucosa. No evidence of intestinal metaplasia, reflux or eosinophilia. 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 Raynaud's is stable, no progressive sclerodactyly, and no e/o pulmonary hypertension to date. Making no changes to nifedipine and omeprazole for symptomatic management for now, continuing at least annual echocardiogram for [facility] screening    1. Limited systemic sclerosis (HCC)  - C REACTIVE PROTEIN, QT; Future  - CBC WITH AUTOMATED DIFF; Future  - METABOLIC PANEL, COMPREHENSIVE; Future  - SED RATE (ESR); Future  - URIC ACID; Future  - LD; Future    2. Fibromyalgia  - URIC ACID; Future  - LD; Future    3. Raynaud's phenomenon without gangrene  -Cont nifedipine ER 30mg daily  - URIC ACID; Future  - LD; Future    Patient Instructions   1. Let me know if your mouth ulcers worsen and I can prescribe you magic mouthwash for pain, or start Plaquenil (hydroxychloroquine) to reduce the frequency of recurrences. 2. Continue nifedipine daily for Raynaud's.  Let me know if this worsens. 3. Labs in 6 months, before next visit. 4. Return in November. Call with leg swelling, increased shortness of breath, or worsened hand swelling in the meantime.         TODAY'S ORDERS    Orders Placed This Encounter    C REACTIVE PROTEIN, QT    CBC WITH AUTOMATED DIFF    METABOLIC PANEL, COMPREHENSIVE    SED RATE (ESR)    URIC ACID    LD     Future Appointments   Date Time Provider Dayne Hsieh   8/22/2022  8:45 AM Apphelene Zuñiga MD [facility] BS AMB   10/27/2022  2:00 PM Inna French MD [facility] BS AMB     Chloe Juarez MD  Adult Rheumatology   [facility]  A Part of [facility], [address] [place] [address]   Phone [phone]  Fax [phone]

## 2022-04-28 ENCOUNTER — OFFICE VISIT (OUTPATIENT)
Dept: NEUROLOGY | Age: 62
End: 2022-04-28
Payer: COMMERCIAL

## 2022-04-28 DIAGNOSIS — R20.0 NUMBNESS AND TINGLING OF BOTH UPPER EXTREMITIES: ICD-10-CM

## 2022-04-28 DIAGNOSIS — R29.898 WEAKNESS OF BOTH ARMS: ICD-10-CM

## 2022-04-28 DIAGNOSIS — M54.2 NECK PAIN: ICD-10-CM

## 2022-04-28 DIAGNOSIS — R20.2 NUMBNESS AND TINGLING OF BOTH UPPER EXTREMITIES: ICD-10-CM

## 2022-04-28 PROCEDURE — 95913 NRV CNDJ TEST 13/> STUDIES: CPT | Performed by: PSYCHIATRY & NEUROLOGY

## 2022-04-28 PROCEDURE — 95886 MUSC TEST DONE W/N TEST COMP: CPT | Performed by: PSYCHIATRY & NEUROLOGY

## 2022-04-28 NOTE — PROCEDURES
ELECTRODIANOSTIC REPORT  Test Date:  2022    Patient: Jeanette Vega : 1960 Physician: Dr. Valerie Taveras   Sex: Female Tech: Jimbo Waldrop, EMG Technician  Ref Phys: Dr. Jose Zavala:  Patient is a 62year-old female who presents with sensory disturbance and weakness in her upper extremities with associated neck pain. Strength was 5/5 throughout. No clear sensory loss. EMG & NCV Findings:      Nerve conduction studies as listed below in the bilateral upper extremities were within reference of normal.    Disposable concentric needle examination of the muscles listed below in the bilateral upper extremities was normal.        Interpretation: This study was normal.  There was no electrodiagnostic evidence upon todays examination suggesting a focal or generalized large fiber neuropathy, myopathy, or cervical radiculopathy. __________________  Micheal Ren D.O.   FAAN        Nerve Conduction Studies  Anti Sensory Summary Table     Stim Site NR Peak (ms) Norm Peak (ms) O-P Amp (µV) Norm O-P Amp Site1 Site2 Dist (cm)   Left Median Anti Sensory (2nd Digit)  35.5 °C   Wrist    2.8 <4 31.5 >11 Wrist 2nd Digit 14.0   Right Median Anti Sensory (2nd Digit)  35.5 °C   Wrist    2.8 <4 42.7 >11 Wrist 2nd Digit 14.0   Left Radial Anti Sensory (Base 1st Digit)  35.5 °C   Wrist    1.7 <2.9 35.4 >15 Wrist Base 1st Digit 10.0   Right Radial Anti Sensory (Base 1st Digit)  35.5 °C   Wrist    1.8 <2.9 33.1 >15 Wrist Base 1st Digit 10.0   Left Ulnar Anti Sensory (5th Digit)  35.5 °C   Wrist    2.3 <4.0 25.3 >10 Wrist 5th Digit 14.0   Right Ulnar Anti Sensory (5th Digit)  35.5 °C   Wrist    2.5 <4.0 32.2 >10 Wrist 5th Digit 14.0     Motor Summary Table     Stim Site NR Onset (ms) Norm Onset (ms) O-P* Amp (mV) Norm O-P Amp P-T Amp (mV) Site1 Site2 Dist (cm) Ronak (m/s)   Left Median Motor (Abd Poll Brev)  35.5 °C   Wrist    2.8 <4.5 8.5 >4.1  Wrist Abd Poll Brev 8.0 29   Elbow    6.3  7.3   Elbow Wrist 21.0 60   Right Median Motor (Abd Poll Brev)  35.5 °C   Wrist    2.9 <4.5 7.3 >4.1  Wrist Abd Poll Brev 8.0 28   Elbow    6.3  7.0   Elbow Wrist 20.0 59   Left Ulnar Motor (Abd Dig Minimi)  35.5 °C   Wrist    2.3 <3.1 9.0 >7.0  Wrist Abd Dig Minimi 8.0 35   B Elbow    5.2  9.0   B Elbow Wrist 19.0 66   A Elbow    6.7  8.5   A Elbow B Elbow 10.0 67   Right Ulnar Motor (Abd Dig Minimi)  35.5 °C   Wrist    2.7 <3.1 10.6 >7.0  Wrist Abd Dig Minimi 8.0 30   B Elbow    5.6  9.9   B Elbow Wrist 20.0 69   A Elbow    7.1  8.9   A Elbow B Elbow 10.0 67     Comparison Summary Table     Stim Site NR Peak (ms) P-T Amp (µV) Site1 Site2 Dist (cm) Delta-0 (ms)   Left Median/Ulnar Palm Comparison (Wrist)  35.5 °C   Median Palm    1.8 41.4 Median Palm Ulnar Palm 8.0 0.3   Ulnar Palm    1.5 17.9       Right Median/Ulnar Palm Comparison (Wrist)  35.5 °C   Median Palm    1.8 34.5 Median Palm Ulnar Palm 8.0 0.1   Ulnar Palm    1.5 24.8         EMG     Side Muscle Nerve Root Ins Act Fibs Psw Recrt Duration Amp Poly Comment   Left Deltoid Axillary C5-6 Nml Nml Nml Nml Nml Nml Nml    Left Triceps Radial C6-7-8 Nml Nml Nml Nml Nml Nml Nml    Left Biceps Musculocut C5-6 Nml Nml Nml Nml Nml Nml Nml    Left PronatorTeres Median C6-7 Nml Nml Nml Nml Nml Nml Nml    Left 1stDorInt Ulnar C8-T1 Nml Nml Nml Nml Nml Nml Nml    Right Deltoid Axillary C5-6 Nml Nml Nml Nml Nml Nml Nml    Right Triceps Radial C6-7-8 Nml Nml Nml Nml Nml Nml Nml    Right PronatorTeres Median C6-7 Nml Nml Nml Nml Nml Nml Nml    Right Biceps Musculocut C5-6 Nml Nml Nml Nml Nml Nml Nml    Right 1stDorInt Ulnar C8-T1 Nml Nml Nml Nml Nml Nml Nml    Right Mid Cerv Parasp Rami C5,6 Nml Nml Nml Nml Nml Nml Nml        Waveforms:

## 2022-05-04 ENCOUNTER — TELEPHONE (OUTPATIENT)
Dept: NEUROLOGY | Age: 62
End: 2022-05-04

## 2022-05-04 NOTE — TELEPHONE ENCOUNTER
Please note. This is a patient of Dr. Suleiman Hester and I only performed the emg. Please follow up with him. Thanks!

## 2022-05-04 NOTE — TELEPHONE ENCOUNTER
Patient called in regards to her MRI results. She stated that she needed to set up an appointment with Dr. Nette Main to go over these results, but could not wait that long to be seen. I offered to schedule her with the NP but she refused. Patient requested that the Dr/Nurse type up a letter with the results to explain what was found, what it means, and what needs to be done. Please advise.  [phone]

## 2022-05-17 ENCOUNTER — DOCUMENTATION ONLY (OUTPATIENT)
Dept: NEUROLOGY | Age: 62
End: 2022-05-17

## 2022-05-17 DIAGNOSIS — R20.2 NUMBNESS AND TINGLING OF BOTH UPPER EXTREMITIES: Primary | ICD-10-CM

## 2022-05-17 DIAGNOSIS — M47.22 CERVICAL RADICULOPATHY DUE TO DEGENERATIVE JOINT DISEASE OF SPINE: ICD-10-CM

## 2022-05-17 DIAGNOSIS — R20.0 NUMBNESS AND TINGLING OF BOTH UPPER EXTREMITIES: Primary | ICD-10-CM

## 2022-05-17 NOTE — PROGRESS NOTES
I called the patient, told her she has moderate arthritis, but no surgical disease in the spine, and her EMG also showed no clear signs of radiculopathy or nerve compression and that is a good thing, and usually this will get better with physical therapy, and patient wants a referral to physical therapy again, so 1 was put in today, and if that fails we can always try pain management

## 2022-06-07 ENCOUNTER — PATIENT MESSAGE (OUTPATIENT)
Dept: INTERNAL MEDICINE CLINIC | Age: 62
End: 2022-06-07

## 2022-06-07 DIAGNOSIS — I10 PRIMARY HYPERTENSION: Primary | ICD-10-CM

## 2022-06-08 RX ORDER — LOSARTAN POTASSIUM 25 MG/1
25 TABLET ORAL DAILY
Qty: 30 TABLET | Refills: 5 | Status: SHIPPED | OUTPATIENT
Start: 2022-06-08 | End: 2022-06-23

## 2022-06-08 NOTE — TELEPHONE ENCOUNTER
Gilda Sanchez 6/8/2022 7:33 AM EDT      ----- Message -----  From: Marquis Matta  Sent: 6/7/2022 7:37 PM EDT  To: [facility]  Subject: Need Carlitos Elders Dr Leigh Ann Lopez I had to stop taking the Nefedipine because my feet, ankles, legs and hands were swelling a lot. Could not see that I had ankles. So let's try something else. Thanks!

## 2022-06-23 ENCOUNTER — HOSPITAL ENCOUNTER (OUTPATIENT)
Dept: PREADMISSION TESTING | Age: 62
Discharge: HOME | End: 2022-06-23
Payer: COMMERCIAL

## 2022-06-23 ENCOUNTER — HOSPITAL ENCOUNTER (OUTPATIENT)
Dept: PHYSICAL THERAPY | Age: 62
Discharge: HOME | End: 2022-06-23

## 2022-06-23 VITALS
HEIGHT: 60 IN | DIASTOLIC BLOOD PRESSURE: 68 MMHG | WEIGHT: 170.42 LBS | RESPIRATION RATE: 20 BRPM | HEART RATE: 91 BPM | SYSTOLIC BLOOD PRESSURE: 131 MMHG | BODY MASS INDEX: 33.46 KG/M2 | OXYGEN SATURATION: 99 % | TEMPERATURE: 98.3 F

## 2022-06-23 LAB
ABO + RH BLD: NORMAL
ALBUMIN SERPL-MCNC: 3.9 G/DL (ref 3.5–5)
ALBUMIN/GLOB SERPL: 1.1 {RATIO} (ref 1.1–2.2)
ALP SERPL-CCNC: 64 U/L (ref 45–117)
ALT SERPL-CCNC: 20 U/L (ref 12–78)
ANION GAP SERPL CALC-SCNC: 3 MMOL/L (ref 5–15)
APPEARANCE UR: CLEAR
APTT PPP: 27.1 SEC (ref 22.1–31)
AST SERPL-CCNC: 12 U/L (ref 15–37)
BACTERIA URNS QL MICRO: NEGATIVE /HPF
BASOPHILS # BLD: 0.1 K/UL (ref 0–0.1)
BASOPHILS NFR BLD: 1 % (ref 0–1)
BILIRUB SERPL-MCNC: 0.6 MG/DL (ref 0.2–1)
BILIRUB UR QL: NEGATIVE
BLOOD GROUP ANTIBODIES SERPL: NORMAL
BUN SERPL-MCNC: 14 MG/DL (ref 6–20)
BUN/CREAT SERPL: 18 (ref 12–20)
CALCIUM SERPL-MCNC: 8.8 MG/DL (ref 8.5–10.1)
CHLORIDE SERPL-SCNC: 107 MMOL/L (ref 97–108)
CO2 SERPL-SCNC: 27 MMOL/L (ref 21–32)
COLOR UR: NORMAL
CREAT SERPL-MCNC: 0.77 MG/DL (ref 0.55–1.02)
DIFFERENTIAL METHOD BLD: ABNORMAL
EOSINOPHIL # BLD: 0.1 K/UL (ref 0–0.4)
EOSINOPHIL NFR BLD: 2 % (ref 0–7)
EPITH CASTS URNS QL MICRO: NORMAL /LPF
ERYTHROCYTE [DISTWIDTH] IN BLOOD BY AUTOMATED COUNT: 16.8 % (ref 11.5–14.5)
EST. AVERAGE GLUCOSE BLD GHB EST-MCNC: 103 MG/DL
GLOBULIN SER CALC-MCNC: 3.4 G/DL (ref 2–4)
GLUCOSE SERPL-MCNC: 81 MG/DL (ref 65–100)
GLUCOSE UR STRIP.AUTO-MCNC: NEGATIVE MG/DL
HBA1C MFR BLD: 5.2 % (ref 4–5.6)
HCT VFR BLD AUTO: 30.4 % (ref 35–47)
HGB BLD-MCNC: 9.1 G/DL (ref 11.5–16)
HGB UR QL STRIP: NEGATIVE
HYALINE CASTS URNS QL MICRO: NORMAL /LPF (ref 0–2)
IMM GRANULOCYTES # BLD AUTO: 0 K/UL (ref 0–0.04)
IMM GRANULOCYTES NFR BLD AUTO: 0 % (ref 0–0.5)
INR PPP: 1 (ref 0.9–1.1)
KETONES UR QL STRIP.AUTO: NEGATIVE MG/DL
LEUKOCYTE ESTERASE UR QL STRIP.AUTO: NEGATIVE
LYMPHOCYTES # BLD: 1.8 K/UL (ref 0.8–3.5)
LYMPHOCYTES NFR BLD: 20 % (ref 12–49)
MCH RBC QN AUTO: 22.5 PG (ref 26–34)
MCHC RBC AUTO-ENTMCNC: 29.9 G/DL (ref 30–36.5)
MCV RBC AUTO: 75.2 FL (ref 80–99)
MONOCYTES # BLD: 1.1 K/UL (ref 0–1)
MONOCYTES NFR BLD: 12 % (ref 5–13)
NEUTS SEG # BLD: 5.8 K/UL (ref 1.8–8)
NEUTS SEG NFR BLD: 65 % (ref 32–75)
NITRITE UR QL STRIP.AUTO: NEGATIVE
NRBC # BLD: 0 K/UL (ref 0–0.01)
NRBC BLD-RTO: 0 PER 100 WBC
PH UR STRIP: 6.5 [PH] (ref 5–8)
PLATELET # BLD AUTO: 432 K/UL (ref 150–400)
PMV BLD AUTO: 9.6 FL (ref 8.9–12.9)
POTASSIUM SERPL-SCNC: 3.6 MMOL/L (ref 3.5–5.1)
PROT SERPL-MCNC: 7.3 G/DL (ref 6.4–8.2)
PROT UR STRIP-MCNC: NEGATIVE MG/DL
PROTHROMBIN TIME: 10.8 SEC (ref 9–11.1)
RBC # BLD AUTO: 4.04 M/UL (ref 3.8–5.2)
RBC #/AREA URNS HPF: NORMAL /HPF (ref 0–5)
SODIUM SERPL-SCNC: 137 MMOL/L (ref 136–145)
SP GR UR REFRACTOMETRY: 1.01
SPECIMEN EXP DATE BLD: NORMAL
THERAPEUTIC RANGE,PTTT: NORMAL SECS (ref 58–77)
UA: UC IF INDICATED,UAUC: NORMAL
UROBILINOGEN UR QL STRIP.AUTO: 0.2 EU/DL (ref 0.2–1)
WBC # BLD AUTO: 8.9 K/UL (ref 3.6–11)
WBC URNS QL MICRO: NORMAL /HPF (ref 0–4)

## 2022-06-23 PROCEDURE — 97116 GAIT TRAINING THERAPY: CPT

## 2022-06-23 PROCEDURE — 36415 COLL VENOUS BLD VENIPUNCTURE: CPT

## 2022-06-23 PROCEDURE — 80053 COMPREHEN METABOLIC PANEL: CPT

## 2022-06-23 PROCEDURE — 85730 THROMBOPLASTIN TIME PARTIAL: CPT

## 2022-06-23 PROCEDURE — 81001 URINALYSIS AUTO W/SCOPE: CPT

## 2022-06-23 PROCEDURE — 97161 PT EVAL LOW COMPLEX 20 MIN: CPT

## 2022-06-23 PROCEDURE — 93005 ELECTROCARDIOGRAM TRACING: CPT

## 2022-06-23 PROCEDURE — 86900 BLOOD TYPING SEROLOGIC ABO: CPT

## 2022-06-23 PROCEDURE — 85610 PROTHROMBIN TIME: CPT

## 2022-06-23 PROCEDURE — 83036 HEMOGLOBIN GLYCOSYLATED A1C: CPT

## 2022-06-23 PROCEDURE — 85025 COMPLETE CBC W/AUTO DIFF WBC: CPT

## 2022-06-23 RX ORDER — LEVOTHYROXINE AND LIOTHYRONINE 57; 13.5 UG/1; UG/1
90 TABLET ORAL DAILY
COMMUNITY

## 2022-06-23 RX ORDER — CHOLECALCIFEROL (VITAMIN D3) 125 MCG
5 CAPSULE ORAL
COMMUNITY
End: 2022-07-28 | Stop reason: ALTCHOICE

## 2022-06-23 RX ORDER — MELOXICAM 7.5 MG/1
TABLET ORAL DAILY
COMMUNITY
End: 2022-07-06

## 2022-06-23 NOTE — PERIOP NOTES
Incentive Spirometer        Using the incentive spirometer helps expand the small air sacs of your lungs, helps you breathe deeply, and helps improve your lung function. Use your incentive spirometer twice a day (10 breaths each time) prior to surgery. How to Use Your Incentive Spirometer:  1. Hold the incentive spirometer in an upright position. 2. Breathe out as usual.   3. Place the mouthpiece in your mouth and seal your lips tightly around it. 4. Take a deep breath. Breathe in slowly and as deeply as possible. Keep the blue flow rate guide between the arrows. 5. Hold your breath as long as possible. Then exhale slowly and allow the piston to fall to the bottom of the column. 6. Rest for a few seconds and repeat steps one through five at least 10 times. PAT Tidal Volume____1700__  x_2_______________  Date___06/23/22____________________    Malva Kitchen THE INCENTIVE SPIROMETER WITH YOU TO THE HOSPITAL ON THE DAY OF YOUR SURGERY. Opportunity given to ask and answer questions as well as to observe return demonstration.     Patient signature_____________________________    Witness____________________________

## 2022-06-23 NOTE — PROGRESS NOTES
[facility]  Physical Therapy Pre-surgery evaluation  [address], [address]    PHYSICAL THERAPY PRE SPINE SURGERY EVALUATION  Patient: Ancelmo Zarate (17 y.o. female)  Date: 6/23/2022  Primary Diagnosis: pat  Procedure(s) (LRB):  TWO LEVEL ANTERIOR CERVICAL DISCECTOMY AND FUSION C5,6, C6,7 WITH ZERO PROFILE GRAFT AND SYNTHETIC BONE GRAFT (N/A)     Precautions:  Spinal    ASSESSMENT :  Based on the objective data described below, the patient presents with impaired sensation in LUE and BUE weakness with chronic neck/L trap pain due to end stage degenerative joint disease in the cervical spine. Independent with transfers, ambulation and ADLs, but tolerance is limited by neck and LUE pain. Discussed anticipated disposition to home with possible discharge within a 1 to 2 day time frame post-surgery. Patient in agreement. Anticipate patient will need acute PT and OT orders based on current deficits post surgery. GOALS: (Goals have been discussed and agreed upon with patient.)  DISCHARGE GOALS: Time Frame: 1 DAY  1. Patient will demonstrate increased strength, range of motion, and pain control via a home exercise program in order to minimize functional deficits in preparation for their upcoming surgery. This will be achieved by using education, demonstration  and through the use of an informational handout including a home exercise program.  REHABILITATION POTENTIAL FOR STATED GOALS: Excellent     RECOMMENDATIONS AND PLANNED INTERVENTIONS: (Benefits and precautions of physical therapy have been discussed with the patient.)  · Home Exercise Program  TREATMENT PLAN EFFECTIVE DATES: 6/23/2022 to 6/23/2022  FREQUENCY/DURATION: Patient to continue to perform home exercise program at least twice daily until surgery. SUBJECTIVE:   Patient stated  I really hope this gets rid of my pain and numbness.      OBJECTIVE DATA SUMMARY:   HISTORY:      Past Medical History: Diagnosis Date    Anxiety     Arthritis     neck     Asthma 2008    allergy induced in [place] Autoimmune disease ([facility] [address]     limited systemic sclerosis     Cancer ([facility] [address]     basal cell 20 years ago upper abdomen    Foot fracture     Gall stones     Hyperlipidemia     Hypothyroidism     Interstitial cystitis     [facility] Interstitial cystitis     Kidney stones     Menopausal disorder     Other ill-defined conditions(799.89)     kidney stones in [place]    PONV (postoperative nausea and vomiting)     severe nausea 1x in the past    Psychiatric disorder 11/10    anxiety attack    Thyroid disease      Past Surgical History:   Procedure Laterality Date    HX ADENOIDECTOMY  1977    HX CHOLECYSTECTOMY      HX DILATION AND CURETTAGE      miscarriage x1    HX ENDOSCOPY      HX GI  2010    cholecystectomy    HX GYN      laparoscopy for fertility    HX OTHER SURGICAL  2006    hemithyroidectomy right, gall bladder removal    HX TONSILLECTOMY  1977    OH BREAST SURGERY PROCEDURE UNLISTED      left breast lumpectomy       Prior Level of Function/Home Situation: lives with spouse in 1 story home with 4 steps to enter. Currently works and drives - reports neck rotation when driving is painful. L arm can fall asleep and become \"floopy\" when sleeping on her left side. Personal factors and/or comorbidities impacting plan of care:      Home Situation  Home Environment: Private residence  # Steps to Enter: 4  Rails to Enter: Yes  Hand Rails : Bilateral  Wheelchair Ramp: No  One/Two Story Residence: One story  Living Alone: No  Support Systems: Spouse/Significant Other  Patient Expects to be Discharged to[de-identified] Home with family assistance  Current DME Used/Available at Home: None  Tub or Shower Type: Shower    EXAMINATION/PRESENTATION/DECISION MAKING:     ADLs (Current Functional Status):    Bathing/Showering:   [x] Independent  [] Requires Assistance from Someone  [] Sponge Bath Only   Ambulation:  [x] Independent  [] Walk Indoors Only  [x] Walk Outdoors  [] Use Assistive Device  [] Use Wheelchair Only     Dressing:  [x] [address] from Someone for:  [] Sock/Shoes  [] Pants  [] Everything   Household Activities:  [] Routine house and yard work  [x] Light Housework Only  [] None         Critical Behavior:  Neurologic State: Alert  Orientation Level: Oriented X4  Cognition: Appropriate decision making,Appropriate for age attention/concentration,Appropriate safety awareness,Follows commands  Safety/Judgement: Awareness of environment    Strength:    Strength: Generally decreased, functional (R triceps 3+/5; L biceps 4-/5)                        Tone & Sensation:   Tone: Normal              Sensation: Impaired (LUE whole arm intermittently numb and weak w/L sidelying)                   Range Of Motion:  AROM: Within functional limits           PROM: Within functional limits           Coordination:  Coordination: Generally decreased, functional (fingers)    Functional Mobility:  Transfers:  Sit to Stand: Modified independent  Stand to Sit: Modified independent        Bed to Chair: Independent              Balance:   Sitting: Intact  Standing: Intact  Ambulation/Gait Training:              Gait Description (WDL): Within defined limits (independent - 150 feet - no significant deviations)                                                  Functional Measure:  10 Meter walk test:  (Specify if any supplemental oxygen is used, the type, pre, during and post sats.)    Self-Selected Or Fast-Velocity: Self Selected Velocity  Trial 1 (Time to Walk 10 Meters): 12.9 Seconds  Trial 2 (Time to Walk 10 Meters): 11.5 Seconds  Trial 3 (Time to Walk 10 Meters): 12.6 Seconds  Average : 12.3 Seconds  Score (Meters/Second): 0.8 Meters/Second             Walking Speed (m/s)  Modifier Scale Age 52-63 Age 61-76 Age 66-77 Age 80-80    Male Female Male Female Male Female Male Female   CH   0% Impaired ?  1.39 ? 1.40 ? 1.36 ? 1.30 ? 1.33 ? 1.27 ? 1.21 ? 1.15   CI   1-19% Impaired 1.11-1.38 1.12-1.39 1.09-1.35 1.04-1.29 1.06-1.32 1.01-1.26 0.96-1.20 0.92-1.14   CJ   20-39% Impaired 0.83-1.10 0.84-1.11 0.82-1.08 0.78-1.03 0.80-1.05 0.76-1.00 0.72-0.95 0.69-0.91   CK   40-59% Impaired 0.56-0.82 0.57-0.83 0.54-0.81 0.52-0.77 0.53-0.79 0.51-0.75 0.48-0.71 0.46-0.68   CL   60-79% Impaired 0.28-0.55 0.28-0.56 0.27-0.53 0.26-0.51 0.27-0.52 0.25-0.50 0.24-0.49 0.23-0.45   CM   80-99% Impaired 0.01-0.28 < 0.01-0.28 < 0.01-0.27 < 0.01-0.26 0.01-0.27 0.01-0.24 0.01-0.23 0.01-0.22   CN   100% Impaired Cannot Perform   Minimal Detectable Change (MDC-90) = 0.1 m/s  Len R. \"Comfortable and maximum walking speed of adults aged 20-79 years: reference values and determinants. \" Age and Agin Volume 26(1):15-9. Heriberto Stein. \"Age- and gender-related test performance in community-dwelling elderly people: Six-Minute Walk Test, Nicole Balance Scale, Timed Up & Go Test, and gait speeds. \" Physical Therapy: 2002 Volume 82(2):128-37. Roxane Hanson DM, Tammi PW, Reilly Mann JD, Linwood Bellamysammy GUZMAN. \"Assessing stability and change of four performance measures: a longitudinal study evaluating outcome following total hip and knee arthroplasty. \" [facility] Musculoskeletal Disorders: 2005 Volume 6(3). Werner Emmanuel, PhD; Kellie Schilling, PhD. Dennis Riggins Paper: \"Walking Speed: the Sixth Vital Sign\" Journal of Geriatric Physical Therapy: 2009 - Volume 32 - Issue 2 - p 2-5 . Pain:                      Radicular Pain:   Neck to L trap and shoulder mostly, but can extend down to L hand at times. Activity Tolerance:   Good. Patient []   does  [x]   does not demonstrate signs/symptoms of shortness of breath/dyspnea on exertion/respiratory distress. COMMUNICATION/EDUCATION:   The patient was educated on:  [x]         Early post operative mobility is imperative to achieve a patient's desired outcomes and to restore biological function.   [x] Post operative spinal precautions may/may not be applicable. These precautions are based on the patient's physician and the procedure(s) performed. [x]         Spinal precautions including:  ·   No bending forward, sideways, or backwards  ·   No twisting   ·   No lifting more than 5-10 pounds  ·   No sitting longer than 30-60 minutes at a time  ·   Cervical aspen collar should be on at all times    The patients plan of care was discussed as follows:   [x]         The patient verbalized understanding of her plan in preparation for their upcoming surgery  []         The patient's  was present for this session  [x]        The patient reports that he/she does not have a  identified at this time  []         The  verbalized understanding of the education regarding the patient's upcoming surgery  [x]         Patient/family agree to work toward stated goals and plan of care. []         Patient understands intent and goals of therapy, but is neutral about his/her participation. []         Patient is unable to participate in goal setting and plan of care.       Thank you for this referral.  Leonardo Durham, PT    Time Calculation: 25 mins

## 2022-06-23 NOTE — PERIOP NOTES
[facility]  Joint/Spine Preoperative Instructions        Surgery Date 07/05/22          Time of Arrival to be called @ [phone]      1. On the day of your surgery, please report to the Surgical Services Registration Desk and sign in at your designated time. The Surgery Center is located to the right of the Emergency Room. 2. You must have someone with you to drive you home. You should not drive a car for 24 hours following surgery. Please make arrangements for a friend or family member to stay with you for the first 24 hours after your surgery. 3. No food after midnight. Medications morning of surgery should be taken with a sip of water. Please follow pre-surgery drink instructions that were given at your Pre Admission Testing appointment. 4. We recommend you do not drink any alcoholic beverages for 24 hours before and after your surgery. 5. Contact your surgeons office for instructions on the following medications: non-steroidal anti-inflammatory drugs (i.e. Advil, Aleve), vitamins, and supplements. (Some surgeons will want you to stop these medications prior to surgery and others may allow you to take them)  **If you are currently taking Plavix, Coumadin, Aspirin and/or other blood-thinning agents, contact your surgeon for instructions. ** Your surgeon will partner with the physician prescribing these medications to determine if it is safe to stop or if you need to continue taking. Please do not stop taking these medications without instructions from your surgeon    6. Wear comfortable clothes. Wear glasses instead of contacts. Do not bring any money or jewelry. Please bring picture ID, insurance card, and any prearranged co-payment or hospital payment. Do not wear make-up, particularly mascara the morning of your surgery. Do not wear nail polish, particularly if you are having foot /hand surgery.   Wear your hair loose or down, no ponytails, buns, evelin pins or clips. All body piercings must be removed. Please shower with antibacterial soap for three consecutive days before and on the morning of surgery, but do not apply any lotions, powders or deodorants after the shower on the day of surgery. Please use a fresh towels after each shower. Please sleep in clean clothes and change bed linens the night before surgery. Please do not shave for 48 hours prior to surgery. Shaving of the face is acceptable. 7. You should understand that if you do not follow these instructions your surgery may be cancelled. If your physical condition changes (I.e. fever, cold or flu) please contact your surgeon as soon as possible. 8. It is important that you be on time. If a situation occurs where you may be late, please call [phone] (OR Holding Area). 9. If you have any questions and or problems, please call [phone] (Pre-admission Testing). 10. Your surgery time may be subject to change. You will receive a phone call the evening prior if your time changes. 11.  If having outpatient surgery, you must have someone to drive you here, stay with you during the duration of your stay, and to drive you home at time of discharge. 12. The following link is for the educational video for patients and/or families. http://hay-carvajal.org/. com/locations/yfhxrcfbj-xbmaanx-jyavgrb/[place]/[facility]/educational-materials        Special Instructions: no vitamins, supplements, aspirin per Dr. Porfirio Clay for 7 days prior to surgery,   Remove estrogen patch Monday nite before surgery      TAKE ALL MEDICATIONS THE DAY OF SURGERY      I understand a pre-operative phone call will be made to verify my surgery time. In the event that I am not available, I give permission for a message to be left on my answering service and/or with another person?   yes         ___________________      __________   _________    (Signature of Patient)             (Witness) (Date and Time)

## 2022-06-23 NOTE — PERIOP NOTES
Orthopedic and Spine Patients: Instructions on When You Can   Eat or Drink Before Surgery      You have been provided 2 pre-surgery drinks received at your pre-admission testing appointment.  Night before surgery:  o You should drink one bottle of the  pre-surgery drink at bedtime. No food after midnight!  Day of Surgery:  o Complete 2nd bottle of the pre-surgery drink 1 hour prior to arrival at hospital.  For questions call Pre-Admission Testing at [phone]. They are available from 8:00am-5:00pm, Monday through Friday.

## 2022-06-23 NOTE — PERIOP NOTES
The [address] [facility]  \"We've Got Your Back! Caring For Yourself Before and After Spine Surgery\" handbook was provided & reviewed with the patient during the pre-admission testing (PAT) appointment. An opportunity for questions was provided, patient verbalized understanding.

## 2022-06-24 LAB
ATRIAL RATE: 79 BPM
CALCULATED P AXIS, ECG09: 48 DEGREES
CALCULATED R AXIS, ECG10: 47 DEGREES
CALCULATED T AXIS, ECG11: 29 DEGREES
DIAGNOSIS, 93000: NORMAL
P-R INTERVAL, ECG05: 148 MS
Q-T INTERVAL, ECG07: 416 MS
QRS DURATION, ECG06: 74 MS
QTC CALCULATION (BEZET), ECG08: 477 MS
VENTRICULAR RATE, ECG03: 79 BPM

## 2022-06-25 LAB
BACTERIA SPEC CULT: NORMAL
BACTERIA SPEC CULT: NORMAL
SERVICE CMNT-IMP: NORMAL

## 2022-06-27 NOTE — ADVANCED PRACTICE NURSE
EKG from 6/23/22 reviewed with Dr. Jocelyn Georges, anesthesiologist and compared with previous EKG from 3/22/21. Planned procedure, PMHx,echocardiogram from 4/2/21 and stress echo from 12/18/18,  functional status reviewed.  Pt ok to proceed with planned procedure per Dr. Jocelyn Georges

## 2022-07-05 ENCOUNTER — ANESTHESIA (OUTPATIENT)
Dept: SURGERY | Age: 62
End: 2022-07-05
Payer: COMMERCIAL

## 2022-07-05 ENCOUNTER — APPOINTMENT (OUTPATIENT)
Dept: GENERAL RADIOLOGY | Age: 62
End: 2022-07-05
Attending: NEUROLOGICAL SURGERY
Payer: COMMERCIAL

## 2022-07-05 ENCOUNTER — ANESTHESIA EVENT (OUTPATIENT)
Dept: SURGERY | Age: 62
End: 2022-07-05
Payer: COMMERCIAL

## 2022-07-05 ENCOUNTER — HOSPITAL ENCOUNTER (OUTPATIENT)
Age: 62
Setting detail: OBSERVATION
Discharge: HOME | End: 2022-07-06
Attending: NEUROLOGICAL SURGERY | Admitting: NEUROLOGICAL SURGERY
Payer: COMMERCIAL

## 2022-07-05 DIAGNOSIS — Z98.1 S/P CERVICAL SPINAL FUSION: Primary | ICD-10-CM

## 2022-07-05 PROBLEM — M50.10 HERNIATION OF CERVICAL INTERVERTEBRAL DISC WITH RADICULOPATHY: Status: ACTIVE | Noted: 2022-07-05

## 2022-07-05 PROBLEM — Z98.890 STATUS POST CERVICAL DISCECTOMY: Status: ACTIVE | Noted: 2022-07-05

## 2022-07-05 PROCEDURE — 74011250637 HC RX REV CODE- 250/637: Performed by: NEUROLOGICAL SURGERY

## 2022-07-05 PROCEDURE — 77030026438 HC STYL ET INTUB CARD -A: Performed by: ANESTHESIOLOGY

## 2022-07-05 PROCEDURE — G0378 HOSPITAL OBSERVATION PER HR: HCPCS

## 2022-07-05 PROCEDURE — 74011000250 HC RX REV CODE- 250: Performed by: NEUROLOGICAL SURGERY

## 2022-07-05 PROCEDURE — 77030008684 HC TU ET CUF COVD -B: Performed by: ANESTHESIOLOGY

## 2022-07-05 PROCEDURE — 77030003029 HC SUT VCRL J&J -B: Performed by: NEUROLOGICAL SURGERY

## 2022-07-05 PROCEDURE — 74011000250 HC RX REV CODE- 250: Performed by: NURSE ANESTHETIST, CERTIFIED REGISTERED

## 2022-07-05 PROCEDURE — 72040 X-RAY EXAM NECK SPINE 2-3 VW: CPT

## 2022-07-05 PROCEDURE — 97161 PT EVAL LOW COMPLEX 20 MIN: CPT

## 2022-07-05 PROCEDURE — 74011250636 HC RX REV CODE- 250/636: Performed by: ANESTHESIOLOGY

## 2022-07-05 PROCEDURE — C1713 ANCHOR/SCREW BN/BN,TIS/BN: HCPCS | Performed by: NEUROLOGICAL SURGERY

## 2022-07-05 PROCEDURE — 74011250636 HC RX REV CODE- 250/636: Performed by: NURSE ANESTHETIST, CERTIFIED REGISTERED

## 2022-07-05 PROCEDURE — 97530 THERAPEUTIC ACTIVITIES: CPT

## 2022-07-05 PROCEDURE — 74011250636 HC RX REV CODE- 250/636: Performed by: NEUROLOGICAL SURGERY

## 2022-07-05 PROCEDURE — 94760 N-INVAS EAR/PLS OXIMETRY 1: CPT

## 2022-07-05 PROCEDURE — 76010000171 HC OR TIME 2 TO 2.5 HR INTENSV-TIER 1: Performed by: NEUROLOGICAL SURGERY

## 2022-07-05 PROCEDURE — 97116 GAIT TRAINING THERAPY: CPT

## 2022-07-05 PROCEDURE — 77030002933 HC SUT MCRYL J&J -A: Performed by: NEUROLOGICAL SURGERY

## 2022-07-05 PROCEDURE — C1889 IMPLANT/INSERT DEVICE, NOC: HCPCS | Performed by: NEUROLOGICAL SURGERY

## 2022-07-05 PROCEDURE — 76000 FLUOROSCOPY <1 HR PHYS/QHP: CPT

## 2022-07-05 PROCEDURE — 77030014647 HC SEAL FBRN TISSL BAXT -D: Performed by: NEUROLOGICAL SURGERY

## 2022-07-05 PROCEDURE — 77010033678 HC OXYGEN DAILY

## 2022-07-05 PROCEDURE — 76060000035 HC ANESTHESIA 2 TO 2.5 HR: Performed by: NEUROLOGICAL SURGERY

## 2022-07-05 PROCEDURE — 76210000016 HC OR PH I REC 1 TO 1.5 HR: Performed by: NEUROLOGICAL SURGERY

## 2022-07-05 PROCEDURE — 74011250637 HC RX REV CODE- 250/637: Performed by: NURSE PRACTITIONER

## 2022-07-05 PROCEDURE — 2709999900 HC NON-CHARGEABLE SUPPLY: Performed by: NEUROLOGICAL SURGERY

## 2022-07-05 PROCEDURE — 77030010507 HC ADH SKN DERMBND J&J -B: Performed by: NEUROLOGICAL SURGERY

## 2022-07-05 PROCEDURE — 74011250637 HC RX REV CODE- 250/637: Performed by: NURSE ANESTHETIST, CERTIFIED REGISTERED

## 2022-07-05 DEVICE — COALITION SPACER, 12MM X 14MM, 7DEG, 6MM
Type: IMPLANTABLE DEVICE | Site: SPINE CERVICAL | Status: FUNCTIONAL
Brand: COALITION

## 2022-07-05 DEVICE — 3.6MM BONE SCREW, VARIABLE, SELF-DRILLING, 12MM
Type: IMPLANTABLE DEVICE | Site: SPINE CERVICAL | Status: FUNCTIONAL
Brand: COALITION

## 2022-07-05 DEVICE — I-FACTOR PUTTY, 1.0CC SYRINGE
Type: IMPLANTABLE DEVICE | Site: SPINE CERVICAL | Status: FUNCTIONAL
Brand: I-FACTOR PEPTIDE ENHANCED BONE GRAFT

## 2022-07-05 RX ORDER — DIPHENHYDRAMINE HYDROCHLORIDE 50 MG/ML
25 INJECTION, SOLUTION INTRAMUSCULAR; INTRAVENOUS
Status: DISCONTINUED | OUTPATIENT
Start: 2022-07-05 | End: 2022-07-05 | Stop reason: HOSPADM

## 2022-07-05 RX ORDER — LEVOTHYROXINE AND LIOTHYRONINE 19; 4.5 UG/1; UG/1
90 TABLET ORAL
Status: DISCONTINUED | OUTPATIENT
Start: 2022-07-05 | End: 2022-07-06 | Stop reason: HOSPADM

## 2022-07-05 RX ORDER — ONDANSETRON 2 MG/ML
4 INJECTION INTRAMUSCULAR; INTRAVENOUS AS NEEDED
Status: DISCONTINUED | OUTPATIENT
Start: 2022-07-05 | End: 2022-07-05 | Stop reason: HOSPADM

## 2022-07-05 RX ORDER — SODIUM CHLORIDE 0.9 % (FLUSH) 0.9 %
5-40 SYRINGE (ML) INJECTION EVERY 8 HOURS
Status: DISCONTINUED | OUTPATIENT
Start: 2022-07-05 | End: 2022-07-06 | Stop reason: HOSPADM

## 2022-07-05 RX ORDER — LIDOCAINE HYDROCHLORIDE 20 MG/ML
INJECTION, SOLUTION EPIDURAL; INFILTRATION; INTRACAUDAL; PERINEURAL AS NEEDED
Status: DISCONTINUED | OUTPATIENT
Start: 2022-07-05 | End: 2022-07-05 | Stop reason: HOSPADM

## 2022-07-05 RX ORDER — MIDAZOLAM HYDROCHLORIDE 1 MG/ML
1 INJECTION, SOLUTION INTRAMUSCULAR; INTRAVENOUS AS NEEDED
Status: DISCONTINUED | OUTPATIENT
Start: 2022-07-05 | End: 2022-07-05 | Stop reason: HOSPADM

## 2022-07-05 RX ORDER — PREGABALIN 75 MG/1
150 CAPSULE ORAL ONCE
Status: COMPLETED | OUTPATIENT
Start: 2022-07-05 | End: 2022-07-05

## 2022-07-05 RX ORDER — METHADONE HYDROCHLORIDE 10 MG/ML
INJECTION, SOLUTION INTRAMUSCULAR; INTRAVENOUS; SUBCUTANEOUS
Status: DISPENSED
Start: 2022-07-05 | End: 2022-07-05

## 2022-07-05 RX ORDER — ROCURONIUM BROMIDE 10 MG/ML
INJECTION, SOLUTION INTRAVENOUS AS NEEDED
Status: DISCONTINUED | OUTPATIENT
Start: 2022-07-05 | End: 2022-07-05 | Stop reason: HOSPADM

## 2022-07-05 RX ORDER — SODIUM CHLORIDE, SODIUM LACTATE, POTASSIUM CHLORIDE, CALCIUM CHLORIDE 600; 310; 30; 20 MG/100ML; MG/100ML; MG/100ML; MG/100ML
25 INJECTION, SOLUTION INTRAVENOUS CONTINUOUS
Status: DISCONTINUED | OUTPATIENT
Start: 2022-07-05 | End: 2022-07-05 | Stop reason: HOSPADM

## 2022-07-05 RX ORDER — ONDANSETRON 2 MG/ML
INJECTION INTRAMUSCULAR; INTRAVENOUS AS NEEDED
Status: DISCONTINUED | OUTPATIENT
Start: 2022-07-05 | End: 2022-07-05 | Stop reason: HOSPADM

## 2022-07-05 RX ORDER — DIPHENHYDRAMINE HYDROCHLORIDE 50 MG/ML
INJECTION, SOLUTION INTRAMUSCULAR; INTRAVENOUS AS NEEDED
Status: DISCONTINUED | OUTPATIENT
Start: 2022-07-05 | End: 2022-07-05 | Stop reason: HOSPADM

## 2022-07-05 RX ORDER — ACETAMINOPHEN 500 MG
1000 TABLET ORAL ONCE
Status: COMPLETED | OUTPATIENT
Start: 2022-07-05 | End: 2022-07-05

## 2022-07-05 RX ORDER — OXYCODONE HYDROCHLORIDE 5 MG/1
10 TABLET ORAL
Status: DISCONTINUED | OUTPATIENT
Start: 2022-07-05 | End: 2022-07-05

## 2022-07-05 RX ORDER — PROPOFOL 10 MG/ML
INJECTION, EMULSION INTRAVENOUS
Status: DISCONTINUED | OUTPATIENT
Start: 2022-07-05 | End: 2022-07-05 | Stop reason: HOSPADM

## 2022-07-05 RX ORDER — FENTANYL CITRATE 50 UG/ML
50 INJECTION, SOLUTION INTRAMUSCULAR; INTRAVENOUS AS NEEDED
Status: DISCONTINUED | OUTPATIENT
Start: 2022-07-05 | End: 2022-07-05 | Stop reason: HOSPADM

## 2022-07-05 RX ORDER — PROPOFOL 10 MG/ML
INJECTION, EMULSION INTRAVENOUS AS NEEDED
Status: DISCONTINUED | OUTPATIENT
Start: 2022-07-05 | End: 2022-07-05 | Stop reason: HOSPADM

## 2022-07-05 RX ORDER — DIPHENHYDRAMINE HCL 25 MG
25 CAPSULE ORAL
Status: DISCONTINUED | OUTPATIENT
Start: 2022-07-05 | End: 2022-07-06 | Stop reason: HOSPADM

## 2022-07-05 RX ORDER — FAMOTIDINE 10 MG/ML
INJECTION INTRAVENOUS AS NEEDED
Status: DISCONTINUED | OUTPATIENT
Start: 2022-07-05 | End: 2022-07-05 | Stop reason: HOSPADM

## 2022-07-05 RX ORDER — NEOSTIGMINE METHYLSULFATE 1 MG/ML
INJECTION, SOLUTION INTRAVENOUS AS NEEDED
Status: DISCONTINUED | OUTPATIENT
Start: 2022-07-05 | End: 2022-07-05 | Stop reason: HOSPADM

## 2022-07-05 RX ORDER — SODIUM CHLORIDE 9 MG/ML
125 INJECTION, SOLUTION INTRAVENOUS CONTINUOUS
Status: DISPENSED | OUTPATIENT
Start: 2022-07-05 | End: 2022-07-06

## 2022-07-05 RX ORDER — TRAMADOL HYDROCHLORIDE 50 MG/1
50 TABLET ORAL
Status: DISCONTINUED | OUTPATIENT
Start: 2022-07-05 | End: 2022-07-06 | Stop reason: HOSPADM

## 2022-07-05 RX ORDER — EPHEDRINE SULFATE/0.9% NACL/PF 50 MG/5 ML
SYRINGE (ML) INTRAVENOUS AS NEEDED
Status: DISCONTINUED | OUTPATIENT
Start: 2022-07-05 | End: 2022-07-05 | Stop reason: HOSPADM

## 2022-07-05 RX ORDER — FENTANYL CITRATE 50 UG/ML
INJECTION, SOLUTION INTRAMUSCULAR; INTRAVENOUS AS NEEDED
Status: DISCONTINUED | OUTPATIENT
Start: 2022-07-05 | End: 2022-07-05 | Stop reason: HOSPADM

## 2022-07-05 RX ORDER — FAMOTIDINE 10 MG/ML
INJECTION INTRAVENOUS
Status: COMPLETED
Start: 2022-07-05 | End: 2022-07-05

## 2022-07-05 RX ORDER — METHADONE HYDROCHLORIDE 10 MG/ML
CONCENTRATE ORAL AS NEEDED
Status: DISCONTINUED | OUTPATIENT
Start: 2022-07-05 | End: 2022-07-05 | Stop reason: HOSPADM

## 2022-07-05 RX ORDER — POLYETHYLENE GLYCOL 3350 17 G/17G
17 POWDER, FOR SOLUTION ORAL DAILY
Status: DISCONTINUED | OUTPATIENT
Start: 2022-07-05 | End: 2022-07-06 | Stop reason: HOSPADM

## 2022-07-05 RX ORDER — ACETAMINOPHEN 325 MG/1
650 TABLET ORAL EVERY 6 HOURS
Status: DISCONTINUED | OUTPATIENT
Start: 2022-07-05 | End: 2022-07-06 | Stop reason: HOSPADM

## 2022-07-05 RX ORDER — ALBUTEROL SULFATE 90 UG/1
1 AEROSOL, METERED RESPIRATORY (INHALATION)
Status: DISCONTINUED | OUTPATIENT
Start: 2022-07-05 | End: 2022-07-06 | Stop reason: HOSPADM

## 2022-07-05 RX ORDER — MORPHINE SULFATE 2 MG/ML
2 INJECTION, SOLUTION INTRAMUSCULAR; INTRAVENOUS
Status: DISCONTINUED | OUTPATIENT
Start: 2022-07-05 | End: 2022-07-05 | Stop reason: HOSPADM

## 2022-07-05 RX ORDER — AMOXICILLIN 250 MG
1 CAPSULE ORAL 2 TIMES DAILY
Status: DISCONTINUED | OUTPATIENT
Start: 2022-07-05 | End: 2022-07-06 | Stop reason: HOSPADM

## 2022-07-05 RX ORDER — ONDANSETRON 4 MG/1
4 TABLET, ORALLY DISINTEGRATING ORAL
Status: DISCONTINUED | OUTPATIENT
Start: 2022-07-05 | End: 2022-07-06 | Stop reason: HOSPADM

## 2022-07-05 RX ORDER — NALOXONE HYDROCHLORIDE 0.4 MG/ML
0.4 INJECTION, SOLUTION INTRAMUSCULAR; INTRAVENOUS; SUBCUTANEOUS AS NEEDED
Status: DISCONTINUED | OUTPATIENT
Start: 2022-07-05 | End: 2022-07-06 | Stop reason: HOSPADM

## 2022-07-05 RX ORDER — GLYCOPYRROLATE 0.2 MG/ML
INJECTION INTRAMUSCULAR; INTRAVENOUS AS NEEDED
Status: DISCONTINUED | OUTPATIENT
Start: 2022-07-05 | End: 2022-07-05 | Stop reason: HOSPADM

## 2022-07-05 RX ORDER — PHENYLEPHRINE HCL IN 0.9% NACL 0.4MG/10ML
SYRINGE (ML) INTRAVENOUS AS NEEDED
Status: DISCONTINUED | OUTPATIENT
Start: 2022-07-05 | End: 2022-07-05 | Stop reason: HOSPADM

## 2022-07-05 RX ORDER — MIDAZOLAM HYDROCHLORIDE 1 MG/ML
INJECTION, SOLUTION INTRAMUSCULAR; INTRAVENOUS AS NEEDED
Status: DISCONTINUED | OUTPATIENT
Start: 2022-07-05 | End: 2022-07-05 | Stop reason: HOSPADM

## 2022-07-05 RX ORDER — FENTANYL CITRATE 50 UG/ML
25 INJECTION, SOLUTION INTRAMUSCULAR; INTRAVENOUS
Status: DISCONTINUED | OUTPATIENT
Start: 2022-07-05 | End: 2022-07-05 | Stop reason: HOSPADM

## 2022-07-05 RX ORDER — OXYCODONE HYDROCHLORIDE 5 MG/1
5 TABLET ORAL
Status: DISCONTINUED | OUTPATIENT
Start: 2022-07-05 | End: 2022-07-05

## 2022-07-05 RX ORDER — DEXAMETHASONE SODIUM PHOSPHATE 4 MG/ML
INJECTION, SOLUTION INTRA-ARTICULAR; INTRALESIONAL; INTRAMUSCULAR; INTRAVENOUS; SOFT TISSUE AS NEEDED
Status: DISCONTINUED | OUTPATIENT
Start: 2022-07-05 | End: 2022-07-05 | Stop reason: HOSPADM

## 2022-07-05 RX ORDER — SODIUM CHLORIDE 0.9 % (FLUSH) 0.9 %
5-40 SYRINGE (ML) INJECTION AS NEEDED
Status: DISCONTINUED | OUTPATIENT
Start: 2022-07-05 | End: 2022-07-06 | Stop reason: HOSPADM

## 2022-07-05 RX ORDER — LIDOCAINE HYDROCHLORIDE 10 MG/ML
0.1 INJECTION, SOLUTION EPIDURAL; INFILTRATION; INTRACAUDAL; PERINEURAL AS NEEDED
Status: DISCONTINUED | OUTPATIENT
Start: 2022-07-05 | End: 2022-07-05 | Stop reason: HOSPADM

## 2022-07-05 RX ADMIN — SENNOSIDES AND DOCUSATE SODIUM 1 TABLET: 50; 8.6 TABLET ORAL at 17:50

## 2022-07-05 RX ADMIN — SODIUM CHLORIDE 125 ML/HR: 9 INJECTION, SOLUTION INTRAVENOUS at 11:08

## 2022-07-05 RX ADMIN — TRAMADOL HYDROCHLORIDE 50 MG: 50 TABLET ORAL at 12:38

## 2022-07-05 RX ADMIN — ACETAMINOPHEN 325MG 650 MG: 325 TABLET ORAL at 23:37

## 2022-07-05 RX ADMIN — Medication 3 AMPULE: at 06:29

## 2022-07-05 RX ADMIN — FAMOTIDINE 20 MG: 10 INJECTION INTRAVENOUS at 07:57

## 2022-07-05 RX ADMIN — SODIUM CHLORIDE, POTASSIUM CHLORIDE, SODIUM LACTATE AND CALCIUM CHLORIDE: 600; 310; 30; 20 INJECTION, SOLUTION INTRAVENOUS at 10:10

## 2022-07-05 RX ADMIN — SODIUM CHLORIDE, PRESERVATIVE FREE 10 ML: 5 INJECTION INTRAVENOUS at 21:55

## 2022-07-05 RX ADMIN — GLYCOPYRROLATE 0.3 MG: 0.2 INJECTION, SOLUTION INTRAMUSCULAR; INTRAVENOUS at 10:07

## 2022-07-05 RX ADMIN — ACETAMINOPHEN 325MG 650 MG: 325 TABLET ORAL at 12:38

## 2022-07-05 RX ADMIN — DEXAMETHASONE SODIUM PHOSPHATE 10 MG: 4 INJECTION, SOLUTION INTRAMUSCULAR; INTRAVENOUS at 07:58

## 2022-07-05 RX ADMIN — DIPHENHYDRAMINE HYDROCHLORIDE 12.5 MG: 50 INJECTION, SOLUTION INTRAMUSCULAR; INTRAVENOUS at 07:52

## 2022-07-05 RX ADMIN — ROCURONIUM BROMIDE 40 MG: 10 INJECTION INTRAVENOUS at 07:56

## 2022-07-05 RX ADMIN — SODIUM CHLORIDE, PRESERVATIVE FREE 10 ML: 5 INJECTION INTRAVENOUS at 14:00

## 2022-07-05 RX ADMIN — Medication 1 AMPULE: at 21:56

## 2022-07-05 RX ADMIN — ROCURONIUM BROMIDE 10 MG: 10 INJECTION INTRAVENOUS at 09:20

## 2022-07-05 RX ADMIN — LIDOCAINE HYDROCHLORIDE 100 MG: 20 INJECTION, SOLUTION EPIDURAL; INFILTRATION; INTRACAUDAL; PERINEURAL at 08:00

## 2022-07-05 RX ADMIN — WATER 2 G: 1 INJECTION INTRAMUSCULAR; INTRAVENOUS; SUBCUTANEOUS at 16:43

## 2022-07-05 RX ADMIN — WATER 2 G: 1 INJECTION INTRAMUSCULAR; INTRAVENOUS; SUBCUTANEOUS at 23:38

## 2022-07-05 RX ADMIN — SENNOSIDES AND DOCUSATE SODIUM 1 TABLET: 50; 8.6 TABLET ORAL at 12:38

## 2022-07-05 RX ADMIN — Medication 3 MG: at 10:08

## 2022-07-05 RX ADMIN — Medication 1000 MG: at 06:29

## 2022-07-05 RX ADMIN — PROPOFOL 150 MG: 10 INJECTION, EMULSION INTRAVENOUS at 07:54

## 2022-07-05 RX ADMIN — PROPOFOL 50 MCG/KG/MIN: 10 INJECTION, EMULSION INTRAVENOUS at 07:52

## 2022-07-05 RX ADMIN — MIDAZOLAM HYDROCHLORIDE 2 MG: 1 INJECTION, SOLUTION INTRAMUSCULAR; INTRAVENOUS at 07:44

## 2022-07-05 RX ADMIN — FENTANYL CITRATE 50 MCG: 50 INJECTION, SOLUTION INTRAMUSCULAR; INTRAVENOUS at 08:37

## 2022-07-05 RX ADMIN — ACETAMINOPHEN 325MG 650 MG: 325 TABLET ORAL at 17:50

## 2022-07-05 RX ADMIN — Medication 40 MCG: at 08:10

## 2022-07-05 RX ADMIN — SODIUM CHLORIDE 125 ML/HR: 9 INJECTION, SOLUTION INTRAVENOUS at 17:51

## 2022-07-05 RX ADMIN — WATER 2 G: 1 INJECTION INTRAMUSCULAR; INTRAVENOUS; SUBCUTANEOUS at 07:57

## 2022-07-05 RX ADMIN — PREGABALIN 150 MG: 75 CAPSULE ORAL at 06:29

## 2022-07-05 RX ADMIN — SODIUM CHLORIDE, POTASSIUM CHLORIDE, SODIUM LACTATE AND CALCIUM CHLORIDE 25 ML/HR: 600; 310; 30; 20 INJECTION, SOLUTION INTRAVENOUS at 06:53

## 2022-07-05 RX ADMIN — TRAMADOL HYDROCHLORIDE 50 MG: 50 TABLET ORAL at 18:44

## 2022-07-05 RX ADMIN — Medication 10 MG: at 07:59

## 2022-07-05 RX ADMIN — ONDANSETRON HYDROCHLORIDE 4 MG: 2 INJECTION, SOLUTION INTRAMUSCULAR; INTRAVENOUS at 08:43

## 2022-07-05 RX ADMIN — Medication 10 MG: at 07:54

## 2022-07-05 RX ADMIN — FENTANYL CITRATE 25 MCG: 50 INJECTION, SOLUTION INTRAMUSCULAR; INTRAVENOUS at 10:55

## 2022-07-05 NOTE — ANESTHESIA POSTPROCEDURE EVALUATION
Procedure(s):  TWO LEVEL ANTERIOR CERVICAL DISCECTOMY AND FUSION C5,6, C6,7 WITH ZERO PROFILE GRAFT AND ALLOGRAFT. general, total IV anesthesia    Anesthesia Post Evaluation      Multimodal analgesia: multimodal analgesia used between 6 hours prior to anesthesia start to PACU discharge  Patient location during evaluation: PACU  Level of consciousness: sleepy but conscious  Pain management: adequate  Airway patency: patent  Anesthetic complications: no  Cardiovascular status: acceptable  Respiratory status: acceptable  Hydration status: acceptable  Comments: +Post-Anesthesia Evaluation and Assessment    Patient: Cecile Sage MRN: [id number]  SSN: [id number]   YOB: 1960  Age: 62 y.o. Sex: female      Cardiovascular Function/Vital Signs    /65   Pulse 75   Temp 36.8 °C (98.2 °F)   Resp 9   Ht 5' (1.524 m)   Wt 77.3 kg (170 lb 6.7 oz)   SpO2 96%   BMI 33.28 kg/m²     Patient is status post Procedure(s):  TWO LEVEL ANTERIOR CERVICAL DISCECTOMY AND FUSION C5,6, C6,7 WITH ZERO PROFILE GRAFT AND ALLOGRAFT. Nausea/Vomiting: Controlled. Postoperative hydration reviewed and adequate. Pain:  Pain Scale 1: Numeric (0 - 10) (07/05/22 1055)  Pain Intensity 1: 8 (07/05/22 1055)   Managed. Neurological Status:   Neuro (WDL): Exceptions to WDL (07/05/22 1023)   At baseline. Mental Status and Level of Consciousness: Arousable. Pulmonary Status:   O2 Device: Nasal cannula (07/05/22 1023)   Adequate oxygenation and airway patent. Complications related to anesthesia: None    Post-anesthesia assessment completed. No concerns.     Signed By: Solitario Feliz MD    7/5/2022  Post anesthesia nausea and vomiting:  controlled  Final Post Anesthesia Temperature Assessment:  Normothermia (36.0-37.5 degrees C)      INITIAL Post-op Vital signs:   Vitals Value Taken Time   /70 07/05/22 1110   Temp 36.8 °C (98.2 °F) 07/05/22 1023   Pulse 78 07/05/22 1116   Resp 21 07/05/22 1116   SpO2 99 % 07/05/22 1116   Vitals shown include unvalidated device data.

## 2022-07-05 NOTE — PERIOP NOTES
TRANSFER - OUT REPORT:    Verbal report given to Ayaan RN(name) on Sujata Hammer  being transferred to [facility](unit) for routine post - op       Report consisted of patients Situation, Background, Assessment and   Recommendations(SBAR). Information from the following report(s) OR Summary, Procedure Summary, Intake/Output and MAR was reviewed with the receiving nurse. Opportunity for questions and clarification was provided.       Patient transported with:   O2 @ 3 liters  Tech

## 2022-07-05 NOTE — DISCHARGE INSTRUCTIONS
Willa Dale M.D. What can I do?  The only exercise you should do is walking. Start by walking twice a day for 5 minutes, then increase by  2-3 minutes every day until you reach 25 minutes twice a day. DO NOT sit for long periods of time (20 minutes at a time for the first couple of weeks, then gradually increase.  No heavy lifting (5 lbs max); no straining, twisting, or bending.  No driving until your physician tells you it is ok. It is, however, ok to ride short distances in a car.  If you are required to wear a back brace, you may remove it when you are sleeping unless your doctor has advised against it.  If you are required to wear a cervical collar, you must sleep in it. You can remove it only for showers. What can I eat?  You may resume your regular home diet as tolerated. If your throat is sore, you may want to eat soft food for the first few days. When can I take a shower?  You may shower leaving on your occlusive (waterproof) dressing on allowing water to run over the dressing. The dressing may be removed in 5 days. The small pieces of tape (steri strips)  underneath it should stay on. Let water run over them, then pat dry gently.  Do not take baths or soak in pools.  You may remove your brace for showering. Medications:   Check with your physician before taking any anti-inflammatory medicines (Advil, Aleve, ibuprofen, aspirin).  Take prescription medicine as directed. DO NOT take more than prescribed. Call your physician if the prescribed dose is not sufficiently controlling your pain.  It is important to have regular bowel movements. Pain medications may cause constipation. Drink plenty of fluids, get enough fiber in your diet, and use stool softeners and drink prune juice to help prevent constipation. Do not take laxatives if at all possible except in severe situations.   It can result in a vicious cycle of constipation and diarrhea.  Do not put any ointments or creams on your incision unless directed to do so by your physician.  Tobacco products should be avoided during the postoperative phase. When do I see the physician again?  Please call your physicians office as soon as you get home to schedule your 1st post operative appointment. You should be seen approximately two weeks after your surgery. At this appointment you will see your doctors Assistant for a wound check and to answer any questions you may have.  You will see your physician approximately six weeks after your surgery.  If you had a fusion, you will need to get an order for xrays to be taken prior to the six week appointment. These should be done on the day of the appointment or 1-2 days before.  If you need the number to your doctors office, please request it from your nurse or see below. NOTIFY YOUR PHYSICIAN OF ANY OF THE FOLLOWING:     Fever above 101º for 24 hrs.  Nausea or vomiting.  Inability to urinate   Loss of bowel or bladder function (sudden onset of incontinence)   Changes in sensation (numbness, tingling, color change) in your extremities   Pain not relieved by your medicine.    Redness, swelling or drainage from your incision   Persistent pain in the calf of either leg   Development of severe pain      FOR APPOINTMENTS OR QUESTIONS CALL:    [facility]  [address]  [phone]

## 2022-07-05 NOTE — PROGRESS NOTES
TRANSFER - IN REPORT:    Verbal report received from Марина Frazier (name) on Maral Shannon  being received from [facility]) for routine post - op      Report consisted of patients Situation, Background, Assessment and   Recommendations(SBAR). Information from the following report(s) SBAR and Kardex was reviewed with the receiving nurse. Opportunity for questions and clarification was provided. Assessment completed upon patients arrival to unit and care assumed.

## 2022-07-05 NOTE — OP NOTES
[address]  OPERATIVE REPORT    Name:  Mukesh Hernandez  MR#:  [id number]  :  1960  ACCOUNT #:  [de-identified]  DATE OF SERVICE:  2022    PREOPERATIVE DIAGNOSIS:  Cervical disk herniation with radiculopathy C5-6, C6-7. POSTOPERATIVE DIAGNOSIS:  Cervical disk herniation with radiculopathy C5-6, C6-7. PROCEDURE PERFORMED:  Two-level anterior cervical diskectomy and fusion with zero-profile biomechanical implant graft and synthetic bone graft C5-6, C6-7. SURGEON:  Amish Spann MD    ASSISTANT:  [facility].    ANESTHESIA:  General.    COMPLICATIONS: none      SPECIMENS REMOVED:  None sent. IMPLANTS:  Two 6 mm high zero-profile coalition grafts Globus spine system with i-FACTOR synthetic bone graft. ESTIMATED BLOOD LOSS:  Minimal.    OPERATIVE PROCEDURE:  Review of imaging studies revealed two-level cervical disk herniations in this patient with radicular pain in the left arm that did not improve with conservative measures. After discussing risks, benefits and alternatives of surgery with her, she wished to proceed. She was taken to the operating room where she was induced under general endotracheal anesthesia, placed on the operating table in the supine position. A shoulder roll placed beneath both shoulders. The anterior cervical region was scrubbed, prepped and draped in the usual sterile fashion. A time-out was performed to identify the correct patient and procedure and appropriate antibiotics administered prior to making the incision and sequential stockings applied for DVT prophylaxis. The patient had noted that she had a number of allergies to narcotic analgesics but when questioned more specifically it sounded like they indeed were relatively minor and could be treated as per Anesthesia with steroids and Benadryl among others. She understood the risks and benefits and elected to proceed.   A horizontal incision was made at the level of the cricoid cartilage on the right side and an avascular plane was developed down to the anterior cervical spine. A blunted needle was placed and disk space was noted to be at C4-5, so I replaced it into the C5-6 interspace, repeated the x-ray to confirm position. A self-retaining retractor was inserted into the edges of the longus colli muscles and [place] posts were placed in the C5, C6 and C7 vertebral bodies respectively. Working first at the C5-6 interspace under loupe magnification, the disk was incised with a #15 blade scalpel. Disk material removed with pituitary rongeurs and curettes. Posterior osteophytes were removed with Kerrison rongeurs and the posterior ligament was opened with a 1-mm Kerrison and extended laterally in both directions until the nerve roots and central canal were felt to be well decompressed. The endplates of the vertebral bodies were scraped with a curette. Attention was then directed to the C6-7 interspace where in a similar fashion the disk was incised with a #15 blade scalpel. Disk material removed with pituitary rongeurs and curettes. Posterior osteophytes were removed to decompress the nerve roots as was opening the posterior longitudinal ligament. The endplates of the vertebral bodies were scraped with a curette. Two 6 mm high zero-profile grafts from the Globus spine system were chosen and i-FACTOR synthetic bone graft was placed in the center of the donut hole of the grafts. The grafts were placed in the interspaces and countersunk 1-2 mm and secured to the vertebral bodies above and below with 12-mm screws. These constructs were locked down and an x-ray obtained to confirm position. The [place] were removed. Bone wax was placed in their sites. The area was irrigated copiously with normal saline, inspected and then closed in layers.   The platysma with 2-0 interrupted inverted Vicryl sutures, the subcuticular layer with 3-0 interrupted inverted Vicryl sutures and a running 4-0 Monocryl and Dermabond applied to the skin edges. The patient had noted that she had an allergy when she had her thyroid surgery to the dressing and as it turned out by the patient's description it sounded more like Tegaderm and the adhesive on the Tegaderm, so we elected to go ahead and put Dermabond instead without a dressing over it. The patient was taken to the recovery room in stable condition.       Anibal Stahl MD      [id number]  D:  07/05/2022 11:12  T:  07/05/2022 12:59  JOB #:  [id number]  CC:  Amish Spann MD

## 2022-07-05 NOTE — PROGRESS NOTES
Ortho / Neurosurgery NP Note    POD# 0  s/p TWO LEVEL ANTERIOR CERVICAL DISCECTOMY AND FUSION C5,6, C6,7 WITH ZERO PROFILE GRAFT AND ALLOGRAFT   Pt seen with  at bedside. Pt resting in bed. Drowsy, wakes to voice. Reports increasing incisional pain and pain radiating down nestor shoulders   Able to quickly drift back to sleep   States unsure of improved in pre-op symptoms given current drowsiness. Discussed multiple narcotic allergies, states usually requires benadryl with opiates. Given current drowsiness can try Tramadol and Tylenol, hold off on benadryl. Discussed with RN. Tolerating regular diet. Denies nausea or issues swallowing. Strong, clear voice. Denies sore throat. VSS Afebrile. 2L NC. Visit Vitals  /80 (BP 1 Location: Right arm, BP Patient Position: At rest)   Pulse 79   Temp 98.2 °F (36.8 °C)   Resp 14   Ht 5' (1.524 m)   Wt 77.3 kg (170 lb 6.7 oz)   LMP 12/23/2011   SpO2 100%   BMI 33.28 kg/m²       Voiding status: due to void   Unmeasurable Output  Urine Occurrence(s): 1 (patient voided in pre op) (07/05/22 0620)      Labs    Lab Results   Component Value Date/Time    HGB 9.1 (L) 06/23/2022 03:44 PM      Lab Results   Component Value Date/Time    INR 1.0 06/23/2022 03:44 PM      Lab Results   Component Value Date/Time    Sodium 137 06/23/2022 03:44 PM    Potassium 3.6 06/23/2022 03:44 PM    Chloride 107 06/23/2022 03:44 PM    CO2 27 06/23/2022 03:44 PM    Glucose 81 06/23/2022 03:44 PM    BUN 14 06/23/2022 03:44 PM    Creatinine 0.77 06/23/2022 03:44 PM    Calcium 8.8 06/23/2022 03:44 PM     Recent Glucose Results: No results found for: GLU, GLUPOC, GLUCPOC        Body mass index is 33.28 kg/m². : A BMI > 30 is classified as obesity and > 40 is classified as morbid obesity. Dermabond dressing c.d.i  Calves soft and supple; No pain with passive stretch  Sensation and motor intact.    SCDs for mechanical DVT proph while in bed     PLAN:  1) Neurovascular assessment q4 hours   2) PT/OT   3) Pain control - scheduled tylenol, prn Tramadol. 4) Readniess for discharge:     [x] Vital Signs stable    [] Hgb stable    [] + Voiding    [x] Wound intact, drainage minimal    [x] Tolerating PO intake     [] Cleared by PT (OT if applicable)     [] Stair training completed (if applicable)    [] Independent / Contact Guard Assist (household distance)     [] Bed mobility     [] Car transfers     [] ADLs    [] Adequate pain control on oral medication alone     Routine post-op care. Plans to return home with 's support once medically stable.      Sowmya Deshpande NP

## 2022-07-05 NOTE — PERIOP NOTES
Floseal Hemostatic Matrix: 10mL   Reference number: [id number]   Lot Number: [id number]   Expiration Date: 04/29/2024    Normal Saline for PRN irrigation

## 2022-07-05 NOTE — ANESTHESIA PREPROCEDURE EVALUATION
Relevant Problems   No relevant active problems       Anesthetic History     PONV          Review of Systems / Medical History  Patient summary reviewed, nursing notes reviewed and pertinent labs reviewed    Pulmonary            Asthma        Neuro/Psych         Psychiatric history     Cardiovascular              Hyperlipidemia    Exercise tolerance: >4 METS     GI/Hepatic/Renal         Renal disease: stones       Endo/Other      Hypothyroidism  Obesity, arthritis and cancer     Other Findings            Physical Exam    Airway  Mallampati: II  TM Distance: 4 - 6 cm  Neck ROM: normal range of motion   Mouth opening: Normal     Cardiovascular  Regular rate and rhythm,  S1 and S2 normal,  no murmur, click, rub, or gallop  Rhythm: regular  Rate: normal         Dental  No notable dental hx       Pulmonary  Breath sounds clear to auscultation               Abdominal  GI exam deferred       Other Findings            Anesthetic Plan    ASA: 3  Anesthesia type: general and total IV anesthesia    Monitoring Plan: BIS      Induction: Intravenous  Anesthetic plan and risks discussed with: Patient      [place] Jluis

## 2022-07-05 NOTE — PROGRESS NOTES
Problem: Mobility Impaired (Adult and Pediatric)  Goal: *Acute Goals and Plan of Care (Insert Text)  Description: FUNCTIONAL STATUS PRIOR TO ADMISSION: Patient was independent and active without use of DME.    HOME SUPPORT PRIOR TO ADMISSION: The patient lived with her supportive . Physical Therapy Goals  Initiated 7/5/2022    1. Patient will move from supine to sit and sit to supine  in bed with modified independence within 4 days. 2. Patient will perform sit to stand with modified independence within 4 days. 3. Patient will ambulate with independence for 200 feet with the least restrictive device within 4 days. 4. Patient will ascend/descend 4 stairs with 1 handrail(s) with modified independence within 4 days. Outcome: Progressing Towards Goal   PHYSICAL THERAPY EVALUATION  Patient: Cecile Sage (79 y.o. female)  Date: 7/5/2022  Primary Diagnosis: Herniation of cervical intervertebral disc with radiculopathy [M50.10]  Status post cervical discectomy [Z98.890]  S/P cervical spinal fusion [Z98.1]  Procedure(s) (LRB):  TWO LEVEL ANTERIOR CERVICAL DISCECTOMY AND FUSION C5,6, C6,7 WITH ZERO PROFILE GRAFT AND ALLOGRAFT (N/A) Day of Surgery   Precautions:        ASSESSMENT  Based on the objective data described below, the patient presents with anxiety regarding pain, HTN, and mild dynamic standing balance deficits following admission for a C5-6 and C6-7 ADCF. BP elevated throughout session but patient drowsy and  pain remained well controlled. Education provided regarding best practice mobility after cervical fusion with demonstrated understanding. Facilitated toilet transfer and gait training x60' without DME. Mod-max cues required for relaxation d/t lack of arm swing and palpable anxiety. Returned to supine post session with all needs met. The patient has good home support and initial mobility.  Do not anticipate discharge PT needs but discussed potential future OP PT if strength concerns remain following a recovery period. Will plan to follow up x1 in the am to increase gait distance before discharge home. Current Level of Function Impacting Discharge (mobility/balance): anxiety with activity and path deviations during eval.       Patient will benefit from skilled therapy intervention to address the above noted impairments. PLAN :  Recommendations and Planned Interventions: bed mobility training, transfer training, gait training, therapeutic exercises and therapeutic activities      Frequency/Duration: Patient will be followed by physical therapy:  x1 additional visit to address goals. Recommendation for discharge: (in order for the patient to meet his/her long term goals)  No skilled physical therapy/ follow up rehabilitation needs identified at this time.       IF patient discharges home will need the following DME: none         SUBJECTIVE:   Patient stated This is so much better than I expected it to be!    OBJECTIVE DATA SUMMARY:   HISTORY:    Past Medical History:   Diagnosis Date    Anxiety     Arthritis     neck     Asthma 2008    allergy induced in [place] Autoimmune disease ([facility]     limited systemic sclerosis     Cancer ([facility] [address]     basal cell 20 years ago upper abdomen    Foot fracture     Gall stones     Hypothyroidism     Interstitial cystitis     Dr. Aundrea Pacheco at [facility] Interstitial cystitis     Kidney stones     Menopausal disorder     Other ill-defined conditions(799.89)     kidney stones in [place]    PONV (postoperative nausea and vomiting)     severe nausea 1x in the past    Psychiatric disorder 11/10    anxiety attack    Thyroid disease      Past Surgical History:   Procedure Laterality Date    HX ADENOIDECTOMY  1977    HX CHOLECYSTECTOMY      HX DILATION AND CURETTAGE      miscarriage x1    HX ENDOSCOPY      HX GI  2010    cholecystectomy    HX GYN      laparoscopy for fertility    HX OTHER SURGICAL  2006    hemithyroidectomy right, gall bladder removal    HX TONSILLECTOMY  1977    [place] BREAST SURGERY PROCEDURE UNLISTED      left breast lumpectomy       Personal factors and/or comorbidities impacting plan of care:     Home Situation  Home Environment: Private residence  # Steps to Enter: 4  Rails to Enter: Yes  Hand Rails : Bilateral  Wheelchair Ramp: No  One/Two Story Residence: One story  Living Alone: No  Support Systems: Spouse/Significant Other  Patient Expects to be Discharged to[de-identified] Home  Current DME Used/Available at Home: None  Tub or Shower Type: Shower    EXAMINATION/PRESENTATION/DECISION MAKING:   Critical Behavior:  Neurologic State: Drowsy  Orientation Level: Oriented to person,Oriented to place,Oriented to situation  Cognition: Follows commands       Range Of Motion:  AROM: Within functional limits           PROM: Within functional limits           Strength:    Strength: Generally decreased, functional (gen decreased bilat triceps weaker than biceps)                    Tone & Sensation:   Tone: Normal              Sensation: Impaired (c/o left UE numbness in sidelying)               Coordination:  Coordination: Generally decreased, functional  Vision:      Functional Mobility:  Bed Mobility:  Rolling: Modified independent  Supine to Sit: Modified independent        Transfers:  Sit to Stand: Modified independent  Stand to Sit: Modified independent        Bed to Chair: Independent              Balance:   Sitting: Intact  Standing: Impaired  Standing - Static: Fair  Standing - Dynamic : Fair  Ambulation/Gait Training:  Distance (ft): 50 Feet (ft)  Assistive Device: Gait belt  Ambulation - Level of Assistance: Contact guard assistance     Gait Description (WDL): Exceptions to WDL  Gait Abnormalities: Decreased step clearance; Altered arm swing;Path deviations        Base of Support: Widened             Physical Therapy Evaluation Charge Determination   History Examination Presentation Decision-Making   MEDIUM  Complexity : 1-2 comorbidities / personal factors will impact the outcome/ POC  LOW Complexity : 1-2 Standardized tests and measures addressing body structure, function, activity limitation and / or participation in recreation  LOW Complexity : Stable, uncomplicated  LOW Complexity : FOTO score of       Based on the above components, the patient evaluation is determined to be of the following complexity level: LOW     Pain Ratin-3/10 cervical spine and left shouler    Activity Tolerance:   Fair  Vitals:    22 1428 22 1430 22 1433 22 1538   BP: (!) 161/49 (!) 156/88 (!) 155/91 (!) 145/95   BP 1 Location:       BP Patient Position: Supine Standing Supine At rest   Pulse:    70   Temp:    97.6 °F (36.4 °C)   Resp:    16   Height:       Weight:       SpO2:    95%       After treatment patient left in no apparent distress:   Sitting in chair and Call bell within reach    COMMUNICATION/EDUCATION:   The patients plan of care was discussed with: Registered nurse. Fall prevention education was provided and the patient/caregiver indicated understanding., Patient/family have participated as able in goal setting and plan of care. and Patient/family agree to work toward stated goals and plan of care.     Thank you for this referral.  Orvis Bound, PT, DPT   Time Calculation: 32 mins

## 2022-07-05 NOTE — PERIOP NOTES
Handoff Report from Operating Room to PACU    Report received from Dana Marrero RN and Louise Hill CRNA regarding Carletha Sizer. Surgeon(s):  Quincy Cifuentes MD  And Procedure(s) (LRB):  TWO LEVEL ANTERIOR CERVICAL DISCECTOMY AND FUSION C5,6, C6,7 WITH ZERO PROFILE GRAFT AND ALLOGRAFT (N/A)  confirmed   with dressings discussed. Anesthesia type, drugs, patient history, complications, estimated blood loss, vital signs, intake and output, and last pain medication, lines, reversal medications and temperature were reviewed.

## 2022-07-06 VITALS
RESPIRATION RATE: 16 BRPM | OXYGEN SATURATION: 100 % | TEMPERATURE: 97.6 F | WEIGHT: 170.42 LBS | HEIGHT: 60 IN | DIASTOLIC BLOOD PRESSURE: 72 MMHG | SYSTOLIC BLOOD PRESSURE: 139 MMHG | BODY MASS INDEX: 33.46 KG/M2 | HEART RATE: 70 BPM

## 2022-07-06 PROCEDURE — 97116 GAIT TRAINING THERAPY: CPT

## 2022-07-06 PROCEDURE — 74011000250 HC RX REV CODE- 250: Performed by: NEUROLOGICAL SURGERY

## 2022-07-06 PROCEDURE — 97165 OT EVAL LOW COMPLEX 30 MIN: CPT | Performed by: OCCUPATIONAL THERAPIST

## 2022-07-06 PROCEDURE — G0378 HOSPITAL OBSERVATION PER HR: HCPCS

## 2022-07-06 PROCEDURE — 74011250637 HC RX REV CODE- 250/637: Performed by: NEUROLOGICAL SURGERY

## 2022-07-06 RX ORDER — POLYETHYLENE GLYCOL 3350 17 G/17G
17 POWDER, FOR SOLUTION ORAL DAILY
Qty: 7 PACKET | Refills: 0 | Status: SHIPPED | OUTPATIENT
Start: 2022-07-07 | End: 2022-07-14

## 2022-07-06 RX ORDER — ACETAMINOPHEN 325 MG/1
650 TABLET ORAL EVERY 6 HOURS
Qty: 56 TABLET | Refills: 0 | Status: SHIPPED | OUTPATIENT
Start: 2022-07-06 | End: 2022-07-13

## 2022-07-06 RX ORDER — AMOXICILLIN 250 MG
1 CAPSULE ORAL 2 TIMES DAILY
Qty: 14 TABLET | Refills: 0 | Status: SHIPPED | OUTPATIENT
Start: 2022-07-06 | End: 2022-07-13

## 2022-07-06 RX ORDER — TRAMADOL HYDROCHLORIDE 50 MG/1
50 TABLET ORAL
Qty: 20 TABLET | Refills: 0 | Status: SHIPPED | OUTPATIENT
Start: 2022-07-06 | End: 2022-07-13

## 2022-07-06 RX ADMIN — SENNOSIDES AND DOCUSATE SODIUM 1 TABLET: 50; 8.6 TABLET ORAL at 08:17

## 2022-07-06 RX ADMIN — ACETAMINOPHEN 325MG 650 MG: 325 TABLET ORAL at 06:11

## 2022-07-06 RX ADMIN — ACETAMINOPHEN 325MG 650 MG: 325 TABLET ORAL at 11:55

## 2022-07-06 RX ADMIN — Medication 1 AMPULE: at 08:16

## 2022-07-06 RX ADMIN — SODIUM CHLORIDE, PRESERVATIVE FREE 10 ML: 5 INJECTION INTRAVENOUS at 06:12

## 2022-07-06 RX ADMIN — LEVOTHYROXINE, LIOTHYRONINE 90 MG: 19; 4.5 TABLET ORAL at 08:17

## 2022-07-06 RX ADMIN — POLYETHYLENE GLYCOL 3350 17 G: 17 POWDER, FOR SOLUTION ORAL at 08:17

## 2022-07-06 NOTE — PROGRESS NOTES
End of Shift Note    Bedside shift change report given to Rebekah (oncoming nurse) by Magdalena Clark (offgoing nurse). Report included the following information SBAR, Kardex, Intake/Output and MAR    Shift worked:  [id number]     Shift summary and any significant changes:     Patient slept well most of the night. Voiding well, minimal complaints of pain, scheduled tylenol used     Concerns for physician to address:   [facility] phone for oncoming shift:   [phone]       Activity:  Activity Level: Up with Assistance  Number times ambulated in hallways past shift: 0  Number of times OOB to chair past shift: 0    Cardiac:   Cardiac Monitoring: No      Cardiac Rhythm: Sinus Rhythm    Access:   Current line(s): PIV     Genitourinary:   Urinary status: voiding    Respiratory:   O2 Device: None (Room air)  Chronic home O2 use?: NO  Incentive spirometer at bedside: YES  Actual Volume (ml): 1250 ml    GI:  Last Bowel Movement Date: 07/03/22  Current diet:  ADULT DIET Regular  Passing flatus: YES  Tolerating current diet: YES       Pain Management:   Patient states pain is manageable on current regimen: YES    Skin:  [place] Score: 20  Interventions: increase time out of bed    Patient Safety:  Fall Score:  Total Score: 3  Interventions: bed/chair alarm, gripper socks and pt to call before getting OOB  High Fall Risk: Yes    Length of Stay:  Expected LOS: - - -  Actual LOS: 0      [facility]

## 2022-07-06 NOTE — PROGRESS NOTES
Problem: Simple Spine Procedure:  Day of Surgery  Goal: Off Pathway (Use only if patient is Off Pathway)  Outcome: Resolved/Met  Goal: Activity/Safety  Outcome: Resolved/Met  Goal: Consults, if ordered  Outcome: Resolved/Met  Goal: Nutrition/Diet  Outcome: Resolved/Met  Goal: Discharge Planning  Outcome: Resolved/Met  Goal: Medications  Outcome: Resolved/Met  Goal: Respiratory  Outcome: Resolved/Met  Goal: Treatments/Interventions/Procedures  Outcome: Resolved/Met  Goal: Psychosocial  Outcome: Resolved/Met  Goal: *Verbalizes understanding of type and use of pain medication  Outcome: Resolved/Met  Goal: *Optimal pain control at patient's stated goal  Outcome: Resolved/Met  Goal: *Verbalizes/demonstrates understanding of proper body mechanics and use of stabilization device if ordered  Outcome: Resolved/Met  Goal: *Activity level attained as ordered  Outcome: Resolved/Met  Goal: *Active bowel sounds  Outcome: Resolved/Met  Goal: *Respiratory status stable  Outcome: Resolved/Met  Goal: *Adequate urinary output  Outcome: Resolved/Met  Goal: *Hemodynamically stable  Outcome: Resolved/Met

## 2022-07-06 NOTE — PROGRESS NOTES
Problem: Patient Education: Go to Patient Education Activity  Goal: Patient/Family Education  7/6/2022 1250 by Laural Blizzard, RN  Outcome: Resolved/Met  7/6/2022 1250 by Laural Blizzard, RN  Outcome: Progressing Towards Goal     Problem: Simple Spine Procedure:  Post Op Day 1/Day of Discharge  Goal: Off Pathway (Use only if patient is Off Pathway)  7/6/2022 1250 by Laural Blizzard, RN  Outcome: Resolved/Met  7/6/2022 1250 by Laural Blizzard, RN  Outcome: Progressing Towards Goal  Goal: Activity/Safety  7/6/2022 1250 by Laural Blizzard, RN  Outcome: Resolved/Met  7/6/2022 1250 by Laural Blizzard, RN  Outcome: Progressing Towards Goal  Goal: Nutrition/Diet  7/6/2022 1250 by Laural Blizzard, RN  Outcome: Resolved/Met  7/6/2022 1250 by Laural Blizzard, RN  Outcome: Progressing Towards Goal  Goal: Discharge Planning  7/6/2022 1250 by Laural Blizzard, RN  Outcome: Resolved/Met  7/6/2022 1250 by Laural Blizzard, RN  Outcome: Progressing Towards Goal  Goal: Medications  7/6/2022 1250 by Laural Blizzard, RN  Outcome: Resolved/Met  7/6/2022 1250 by Laural Blizzard, RN  Outcome: Progressing Towards Goal  Goal: Respiratory  7/6/2022 1250 by Laural Blizzard, RN  Outcome: Resolved/Met  7/6/2022 1250 by Laural Blizzard, RN  Outcome: Progressing Towards Goal  Goal: Treatments/Interventions/Procedures  7/6/2022 1250 by Laural Blizzard, RN  Outcome: Resolved/Met  7/6/2022 1250 by Laural Blizzard, RN  Outcome: Progressing Towards Goal  Goal: Psychosocial  7/6/2022 1250 by Laural Blizzard, RN  Outcome: Resolved/Met  7/6/2022 1250 by Laural Blizzard, RN  Outcome: Progressing Towards Goal     Problem: Simple Spine Procedure: Discharge Outcomes  Goal: *Optimal pain control at patient's stated goal  7/6/2022 1250 by Laural Blizzard, RN  Outcome: Resolved/Met  7/6/2022 1250 by Laural Blizzard, RN  Outcome: Progressing Towards Goal  Goal: *Demonstrates ability to place and remove stabilization device  7/6/2022 1250 by Lennox Palma, Sonali, RN  Outcome: Resolved/Met  7/6/2022 1250 by Juliano Heart RN  Outcome: Progressing Towards Goal  Goal: *Progress independence mobility/activities (eg: Mobility precautions)  7/6/2022 1250 by Juliano Heart, RN  Outcome: Resolved/Met  7/6/2022 1250 by Juliano Heart RN  Outcome: Progressing Towards Goal  Goal: *Resumes normal function of bladder and bowel  7/6/2022 1250 by Juliano Heart, RN  Outcome: Resolved/Met  7/6/2022 1250 by Juliano Heart RN  Outcome: Progressing Towards Goal  Goal: *Lungs clear or at baseline  7/6/2022 1250 by Juliano Heart, RN  Outcome: Resolved/Met  7/6/2022 1250 by Juliano Heart RN  Outcome: Progressing Towards Goal  Goal: *Verbalizes name, dosage, time, side effects, and number of days to continue medications  7/6/2022 1250 by Juliano Heart, RN  Outcome: Resolved/Met  7/6/2022 1250 by Juliano Heart RN  Outcome: Progressing Towards Goal  Goal: *Modified independence with transfers, ambulation on levels with assistance devices, stair climbing, ADL's  7/6/2022 1250 by Juliano Heart RN  Outcome: Resolved/Met  7/6/2022 1250 by Juliano Heart RN  Outcome: Progressing Towards Goal  Goal: *Describes follow-up/return visits to physicians  7/6/2022 1250 by Juliano Heart, RN  Outcome: Resolved/Met  7/6/2022 1250 by Juliano Heart RN  Outcome: Progressing Towards Goal  Goal: *Describes available resources and support systems  7/6/2022 1250 by Juliano Heart, RN  Outcome: Resolved/Met  7/6/2022 1250 by Juliano Heart RN  Outcome: Progressing Towards Goal  Goal: *Labs within defined limits  7/6/2022 1250 by Juliano Heart, RN  Outcome: Resolved/Met  7/6/2022 1250 by Juliano Heart RN  Outcome: Progressing Towards Goal  Goal: *Tolerating diet  7/6/2022 1250 by Juliano Heart, RN  Outcome: Resolved/Met  7/6/2022 1250 by Juliano Heart RN  Outcome: Progressing Towards Goal     Problem: Falls - Risk of  Goal: *Absence of Falls  Description: Document Cyndie Aguirre Fall Risk and appropriate interventions in the flowsheet.   7/6/2022 1250 by Maria Luz Tobias RN  Outcome: Resolved/Met  Note: Fall Risk Interventions:  Mobility Interventions: Bed/chair exit alarm,Communicate number of staff needed for ambulation/transfer,Patient to call before getting OOB,Utilize walker, cane, or other assistive device         Medication Interventions: Evaluate medications/consider consulting pharmacy,Patient to call before getting OOB,Teach patient to arise slowly    Elimination Interventions: Bed/chair exit alarm,Call light in reach,Patient to call for help with toileting needs,Toileting schedule/hourly rounds           7/6/2022 1250 by Maria Luz Tobias RN  Outcome: Progressing Towards Goal  Note: Fall Risk Interventions:  Mobility Interventions: Bed/chair exit alarm,Communicate number of staff needed for ambulation/transfer,Patient to call before getting OOB,Utilize walker, cane, or other assistive device         Medication Interventions: Evaluate medications/consider consulting pharmacy,Patient to call before getting OOB,Teach patient to arise slowly    Elimination Interventions: Bed/chair exit alarm,Call light in reach,Patient to call for help with toileting needs,Toileting schedule/hourly rounds              Problem: Patient Education: Go to Patient Education Activity  Goal: Patient/Family Education  7/6/2022 1250 by Maria Luz Tobias RN  Outcome: Resolved/Met  7/6/2022 1250 by Maria Luz Tobias RN  Outcome: Progressing Towards Goal

## 2022-07-06 NOTE — PROGRESS NOTES
Ortho / Neurosurgery NP Note    POD# 1  s/p TWO LEVEL ANTERIOR CERVICAL DISCECTOMY AND FUSION C5,6, C6,7 WITH ZERO PROFILE GRAFT AND ALLOGRAFT   Pt seen with no visitor present. Pt resting in bed. Awake and alert, in NAD. Incisional pain well controlled with Tramadol   Reports BUE feel stronger since surgery, denies numbness and tingling. Tolerating regular diet. No nausea or issues swallowing. Strong, clear voice. Ambulated to bathroom overnight, tolerating activity well. VSS Afebrile. Room air. Visit Vitals  /81   Pulse 85   Temp 98.3 °F (36.8 °C)   Resp 18   Ht 5' (1.524 m)   Wt 77.3 kg (170 lb 6.7 oz)   LMP 12/23/2011   SpO2 98%   BMI 33.28 kg/m²       Voiding status: voiding   Output (mL)  Urine Voided: 300 ml (07/05/22 2155)  Last Bowel Movement Date: 07/03/22 (07/06/22 0806)  Unmeasurable Output  Urine Occurrence(s): 1 (patient voided in pre op) (07/05/22 0620)      Labs    Lab Results   Component Value Date/Time    HGB 9.1 (L) 06/23/2022 03:44 PM      Lab Results   Component Value Date/Time    INR 1.0 06/23/2022 03:44 PM      Lab Results   Component Value Date/Time    Sodium 137 06/23/2022 03:44 PM    Potassium 3.6 06/23/2022 03:44 PM    Chloride 107 06/23/2022 03:44 PM    CO2 27 06/23/2022 03:44 PM    Glucose 81 06/23/2022 03:44 PM    BUN 14 06/23/2022 03:44 PM    Creatinine 0.77 06/23/2022 03:44 PM    Calcium 8.8 06/23/2022 03:44 PM     Recent Glucose Results: No results found for: GLU, GLUPOC, GLUCPOC        Body mass index is 33.28 kg/m². : A BMI > 30 is classified as obesity and > 40 is classified as morbid obesity. Dermabond dressing c.d.i  Calves soft and supple; No pain with passive stretch  Sensation and motor intact. SCDs for mechanical DVT proph while in bed     PLAN:  1) Neurovascular assessment q4 hours   2) PT/OT   3) Pain control - scheduled tylenol, prn Tramadol.    4) Readniess for discharge:     [x] Vital Signs stable    [x] + Voiding    [x] Wound intact, drainage minimal    [x] Tolerating PO intake     [] Cleared by PT (OT if applicable)     [] Stair training completed (if applicable)    [] Independent / Contact Guard Assist (household distance)     [] Bed mobility     [] Car transfers     [] ADLs    [x] Adequate pain control on oral medication alone     Discharge home today with 's support after therapy.      Marce Concepcion NP

## 2022-07-06 NOTE — PROGRESS NOTES
Problem: Patient Education: Go to Patient Education Activity  Goal: Patient/Family Education  Outcome: Progressing Towards Goal     Problem: Simple Spine Procedure:  Post Op Day 1/Day of Discharge  Goal: Off Pathway (Use only if patient is Off Pathway)  Outcome: Progressing Towards Goal  Goal: Activity/Safety  Outcome: Progressing Towards Goal  Goal: Nutrition/Diet  Outcome: Progressing Towards Goal  Goal: Discharge Planning  Outcome: Progressing Towards Goal  Goal: Medications  Outcome: Progressing Towards Goal  Goal: Respiratory  Outcome: Progressing Towards Goal  Goal: Treatments/Interventions/Procedures  Outcome: Progressing Towards Goal  Goal: Psychosocial  Outcome: Progressing Towards Goal     Problem: Simple Spine Procedure: Discharge Outcomes  Goal: *Optimal pain control at patient's stated goal  Outcome: Progressing Towards Goal  Goal: *Demonstrates ability to place and remove stabilization device  Outcome: Progressing Towards Goal  Goal: *Progress independence mobility/activities (eg: Mobility precautions)  Outcome: Progressing Towards Goal  Goal: *Resumes normal function of bladder and bowel  Outcome: Progressing Towards Goal  Goal: *Lungs clear or at baseline  Outcome: Progressing Towards Goal  Goal: *Verbalizes name, dosage, time, side effects, and number of days to continue medications  Outcome: Progressing Towards Goal  Goal: *Modified independence with transfers, ambulation on levels with assistance devices, stair climbing, ADL's  Outcome: Progressing Towards Goal  Goal: *Describes follow-up/return visits to physicians  Outcome: Progressing Towards Goal  Goal: *Describes available resources and support systems  Outcome: Progressing Towards Goal  Goal: *Labs within defined limits  Outcome: Progressing Towards Goal  Goal: *Tolerating diet  Outcome: Progressing Towards Goal     Problem: Falls - Risk of  Goal: *Absence of Falls  Description: Document Carter Fall Risk and appropriate interventions in the flowsheet.   Outcome: Progressing Towards Goal  Note: Fall Risk Interventions:  Mobility Interventions: Bed/chair exit alarm,Communicate number of staff needed for ambulation/transfer,Patient to call before getting OOB,Utilize walker, cane, or other assistive device         Medication Interventions: Evaluate medications/consider consulting pharmacy,Patient to call before getting OOB,Teach patient to arise slowly    Elimination Interventions: Bed/chair exit alarm,Call light in reach,Patient to call for help with toileting needs,Toileting schedule/hourly rounds              Problem: Patient Education: Go to Patient Education Activity  Goal: Patient/Family Education  Outcome: Progressing Towards Goal

## 2022-07-06 NOTE — PROGRESS NOTES
OCCUPATIONAL THERAPY EVALUATION/DISCHARGE  Patient: Veronika Diaz (70 y.o. female)  Date: 7/6/2022  Primary Diagnosis: Herniation of cervical intervertebral disc with radiculopathy [M50.10]  Status post cervical discectomy [Z98.890]  S/P cervical spinal fusion [Z98.1]  Procedure(s) (LRB):  TWO LEVEL ANTERIOR CERVICAL DISCECTOMY AND FUSION C5,6, C6,7 WITH ZERO PROFILE GRAFT AND ALLOGRAFT (N/A) 1 Day Post-Op   Precautions: No lift more than 7 lbs. ASSESSMENT  Based on the objective data described below, the patient presents s/p C 5-7 ACDF, with intact overall strength, balance, coordination and activity tolerance for the performance of functional activity. She is currently demonstrating the ability to complete her ADLs and functional mobility at her independent baseline. Appropriate safety awareness was noted t/o all ADLs and functional mobility performed during this evaluation. The patient is without further OT needs acutely and after discharge. Functional Outcome Measure: The patient scored 100/100 on the Barthel Index outcome measure which is indicative of a 0% decline in function. PLAN :  Recommendation for discharge: (in order for the patient to meet his/her long term goals)  No skilled occupational therapy/ follow up rehabilitation needs identified at this time.     Equipment recommendations for successful discharge: None       OBJECTIVE DATA SUMMARY:   HISTORY:   Past Medical History:   Diagnosis Date    Anxiety     Arthritis     neck     Asthma 2008    allergy induced in [place] Autoimmune disease ([facility] [address]     limited systemic sclerosis     Cancer ([facility] [address]     basal cell 20 years ago upper abdomen    Foot fracture     Gall stones     Hypothyroidism     Interstitial cystitis     Dr. Earle Nash at [facility] Interstitial cystitis     Kidney stones     Menopausal disorder     Other ill-defined conditions(799.89)     kidney stones in [place]    PONV (postoperative nausea and vomiting)     severe nausea 1x in the past    Psychiatric disorder 11/10    anxiety attack    Thyroid disease      Past Surgical History:   Procedure Laterality Date    HX ADENOIDECTOMY  1977    HX CHOLECYSTECTOMY      HX DILATION AND CURETTAGE      miscarriage x1    HX ENDOSCOPY      HX GI  2010    cholecystectomy    HX GYN      laparoscopy for fertility    HX OTHER SURGICAL  2006    hemithyroidectomy right, gall bladder removal    HX TONSILLECTOMY  1977    NC BREAST SURGERY PROCEDURE UNLISTED      left breast lumpectomy       Prior Level of Function/Environment/Context: 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 Expanded or extensive additional review of patient history:   Home Situation  Home Environment: Private residence  # Steps to Enter: 4  Rails to Enter: Yes  Hand Rails : Bilateral  Wheelchair Ramp: No  One/Two Story Residence: One story  Living Alone: No  Support Systems: Spouse/Significant Other  Patient Expects to be Discharged to[de-identified] Home  Current DME Used/Available at Home: None  Tub or Shower Type: Shower    Hand dominance: Right    EXAMINATION OF PERFORMANCE DEFICITS:  Cognitive/Behavioral Status:  Neurologic State: Alert  Orientation Level: Oriented X4  Cognition: Appropriate decision making; Appropriate for age attention/concentration; Appropriate safety awareness        Safety/Judgement: Awareness of environment;Good awareness of safety precautions    Hearing: Auditory  Auditory Impairment: None  Hearing Aids/Status: Does not own    Vision/Perceptual:    Acuity: Within Defined Limits    Corrective Lenses: Glasses    Range of Motion:  AROM: Within functional limits    Strength:  Strength: Within functional limits  Coordination:  Coordination: Within functional limits  Fine Motor Skills-Upper: Left Intact; Right Intact    Gross Motor Skills-Upper: Left Intact; Right Intact    Tone & Sensation:  Tone: Normal  Sensation: Impaired (L hand numbness)    Balance:  Sitting: Intact  Standing: Intact    Functional Mobility and Transfers for ADLs:  Bed Mobility:  Rolling: Modified independent  Supine to Sit: Modified independent  Scooting: Independent    Transfers:  Sit to Stand: Independent  Stand to Sit: Independent  Bed to Chair: Independent  Bathroom Mobility: Independent  Toilet Transfer : Independent    ADL Assessment:  Feeding: Independent    Oral Facial Hygiene/Grooming: Independent    Bathing: Independent    Upper Body Dressing: Independent    Lower Body Dressing: Independent    Toileting: Independent              Functional Measure:  Barthel Index:    Bathin  Bladder: 10  Bowels: 10  Groomin  Dressing: 10  Feeding: 10  Mobility: 15  Stairs: 10  Toilet Use: 10  Transfer (Bed to Chair and Back): 15  Total: 100/100        The Barthel ADL Index: Guidelines  1. The index should be used as a record of what a patient does, not as a record of what a patient could do. 2. The main aim is to establish degree of independence from any help, physical or verbal, however minor and for whatever reason. 3. The need for supervision renders the patient not independent. 4. A patient's performance should be established using the best available evidence. Asking the patient, friends/relatives and nurses are the usual sources, but direct observation and common sense are also important. However direct testing is not needed. 5. Usually the patient's performance over the preceding 24-48 hours is important, but occasionally longer periods will be relevant. 6. Middle categories imply that the patient supplies over 50 per cent of the effort. 7. Use of aids to be independent is allowed. Maida Lipoma., Barthel, D.W. (3080). Functional evaluation: the Barthel Index. [address] [facility] (14)2. NICHOLE Fong, Patsy Gaines., Sammy Hayes [place], [address] [place] [address] Ave ().  Measuring the change indisability after inpatient rehabilitation; comparison of the responsiveness of the Barthel Index and Functional Commodore Measure. Journal of Neurology, Neurosurgery, and Psychiatry, 66(4), 254-529. Teto TIM Gottlieb, VALARIE Coffey, & Phi Jones M.A. (2004.) Assessment of post-stroke quality of life in cost-effectiveness studies: The usefulness of the Barthel Index and the EuroQoL-5D. Quality of Life Research, 13, 427-43      Pain Rating:  No complaint of pain    Activity Tolerance:   Good      After treatment patient left in no apparent distress:    Sitting in chair and Call bell within reach    COMMUNICATION/EDUCATION:   The patients plan of care was discussed with: Physical Therapist and Registered Nurse.     Thank you for this referral.  Sammy Benson, OTR/L  Time Calculation: 15 mins

## 2022-07-06 NOTE — PROGRESS NOTES
DISCHARGE NOTE FROM [facility]    Patient determined to be stable for discharge by attending provider. I have reviewed the discharge instructions with the patient and spouse. They verbalized understanding and all questions were answered to their satisfaction. No complaints or further questions were expressed. Medications sent to pharmacy. Appropriate educational materials and medication side effect teaching were provided. PIV were removed prior to discharge. Patient did not discharge with any line, larson, or drain. Personal items and valuables accounted for at discharge by patient and/or family: YES    Post-op patient: Yes- Patient given post-op discharge kit and instructed on use.        Eloisa Bamberger, RN

## 2022-07-06 NOTE — DISCHARGE SUMMARY
Spine Discharge Summary    Patient ID:  Young Cartagena  [id number]  female  62 y.o.  1960    Admit date: 7/5/2022    Discharge date: 7/6/2022    Admitting Physician: Paz Capellan MD     Consulting Physician(s):   Treatment Team: Attending Provider: Earnestine Wolf MD; Care Manager: Davy Diop Primary Nurse: Iron Bliss, RN; Occupational Therapist: Sara Muller, OTR/L; Physical Therapist: Simin Moran PT, DPT    Date of Surgery:   7/5/2022     Preoperative Diagnosis:  HERNIATED DISC WITH RADICULOPATHY    Postoperative Diagnosis:   HERNIATED DISC WITH RADICULOPATHY    Procedure(s):  TWO LEVEL ANTERIOR CERVICAL DISCECTOMY AND FUSION C5,6, C6,7 WITH ZERO PROFILE GRAFT AND ALLOGRAFT     Anesthesia Type:   General     Surgeon: Earnestine Wolf MD                            HPI:  Pt is a 62 y.o. female who has a history of HERNIATED DISC WITH RADICULOPATHY  with pain and limitations of activities of daily living who presents at this time for a TWO LEVEL ANTERIOR CERVICAL DISCECTOMY AND FUSION C5,6, C6,7 WITH ZERO PROFILE GRAFT AND ALLOGRAFT  following the failure of conservative management. PMH:   Past Medical History:   Diagnosis Date    Anxiety     Arthritis     neck     Asthma 2008    allergy induced in [place] Autoimmune disease ([facility] [address]     limited systemic sclerosis     Cancer ([facility] [address]     basal cell 20 years ago upper abdomen    Foot fracture     Gall stones     Hypothyroidism     Interstitial cystitis     Dr. Lai Covington at [facility] Interstitial cystitis     Kidney stones     Menopausal disorder     Other ill-defined conditions(799.89)     kidney stones in [place]    PONV (postoperative nausea and vomiting)     severe nausea 1x in the past    Psychiatric disorder 11/10    anxiety attack    Thyroid disease        Body mass index is 33.28 kg/m². : A BMI > 30 is classified as obesity and > 40 is classified as morbid obesity.      Medications upon admission :   Prior to Admission Medications   Prescriptions Last Dose Informant Patient Reported? Taking? Omeprazole delayed release (PRILOSEC D/R) 20 mg tablet 2022 at Unknown time  Yes Yes   Sig: Take 10 mg by mouth two (2) times a day. albuterol (PROVENTIL HFA, VENTOLIN HFA, PROAIR HFA) 90 mcg/actuation inhaler Unknown at Unknown time  No No   Sig: Take 1 Puff by inhalation every six (6) hours as needed for Wheezing. amitriptyline (ELAVIL) 10 mg tablet 2022 at Unknown time  No Yes   Sig: Take 1 Tablet by mouth nightly. Patient taking differently: Take 20 mg by mouth nightly. estradiol-norethindrone (COMBIPATCH) 0.05-0.25 mg/24 hr 2022 at Unknown time  Yes Yes   Si Patch by TransDERmal route two (2) times a week. Pt will leave patch off Monday nite before surgery  And replace after surgery   melatonin 5 mg tablet 2022 at Unknown time  Yes Yes   Sig: Take 5 mg by mouth nightly. meloxicam (MOBIC) 7.5 mg tablet 2022 at Unknown time  Yes Yes   Sig: Take  by mouth daily. thyroid, Pork, ([place] Thyroid) 90 mg tablet 2022 at Unknown time  Yes Yes   Sig: Take 90 mg by mouth daily. Facility-Administered Medications: None        Allergies: Allergies   Allergen Reactions    Adhesive Other (comments)     Eat skin off    Codeine Anaphylaxis    Codeine Sulfate Anaphylaxis    Darvon [Propoxyphene] Itching    Percocet [Oxycodone-Acetaminophen] Itching    Sulfa (Sulfonamide Antibiotics) Hives    Vicodin [Hydrocodone-Acetaminophen] Itching    Avocado Swelling    Demerol [Meperidine] Itching    Dilaudid [Hydromorphone] Itching    Lexapro [Escitalopram] Other (comments)     shake    Sulfamethoxazole-Trimethoprim Rash        Hospital Course: The patient underwent surgery. Complications:  None; patient tolerated the procedure well. Was taken to the PACU in stable condition and then transferred to the ortho floor.       Perioperative Antibiotics:  Ancef     Postoperative Pain Management:  Tramadol & Tylenol     Postoperative transfusions:    Number of units banked PRBCs =   none     Post Op complications: none    Hemoglobin at discharge:    Lab Results   Component Value Date/Time    HGB 9.1 (L) 06/23/2022 03:44 PM    INR 1.0 06/23/2022 03:44 PM       Dressing remained clean, dry and intact. No significant erythema or swelling. Wound appears to be healing without any evidence of infection. Neurovascular exam found to be within normal limits. Physical Therapy started following surgery and participated in bed mobility, transfers and ambulation. Gait:  Gait  Base of Support: Widened  Gait Abnormalities: Decreased step clearance,Altered arm swing,Path deviations  Ambulation - Level of Assistance: Contact guard assistance  Distance (ft): 60 Feet (ft)  Assistive Device: Gait belt                   Discharged to: Home. Condition on Discharge:   stable    Discharge instructions:  - Take pain medications as prescribed  - Resume pre hospital diet      - Discharge activity: activity as tolerated  - Ambulate as tolerated  - Avoid bending, lifting and twisting  - Wound Care Keep wound clean and dry. See discharge instruction sheet. -DISCHARGE MEDICATION LIST     Current Discharge Medication List      START taking these medications    Details   acetaminophen (TYLENOL) 325 mg tablet Take 2 Tablets by mouth every six (6) hours for 7 days. Qty: 56 Tablet, Refills: 0  Start date: 7/6/2022, End date: 7/13/2022      polyethylene glycol (MIRALAX) 17 gram packet Take 1 Packet by mouth daily for 7 days. Qty: 7 Packet, Refills: 0  Start date: 7/7/2022, End date: 7/14/2022      senna-docusate (PERICOLACE) 8.6-50 mg per tablet Take 1 Tablet by mouth two (2) times a day for 7 days. Qty: 14 Tablet, Refills: 0  Start date: 7/6/2022, End date: 7/13/2022      traMADoL (ULTRAM) 50 mg tablet Take 1 Tablet by mouth every six (6) hours as needed for Pain for up to 7 days. Max Daily Amount: 200 mg.   Qty: 20 Tablet, Refills: 0  Start date: 7/6/2022, End date: 7/13/2022    Associated Diagnoses: S/P cervical spinal fusion         CONTINUE these medications which have NOT CHANGED    Details   thyroid, Pork, ([place] Thyroid) 90 mg tablet Take 90 mg by mouth daily. melatonin 5 mg tablet Take 5 mg by mouth nightly. amitriptyline (ELAVIL) 10 mg tablet Take 1 Tablet by mouth nightly. Qty: 60 Tablet, Refills: 5    Associated Diagnoses: Cervical radiculopathy due to intervertebral disc disorder      Omeprazole delayed release (PRILOSEC D/R) 20 mg tablet Take 10 mg by mouth two (2) times a day. estradiol-norethindrone (COMBIPATCH) 0.05-0.25 mg/24 hr 1 Patch by TransDERmal route two (2) times a week. Pt will leave patch off Monday nite before surgery  And replace after surgery      albuterol (PROVENTIL HFA, VENTOLIN HFA, PROAIR HFA) 90 mcg/actuation inhaler Take 1 Puff by inhalation every six (6) hours as needed for Wheezing.   Qty: 1 Inhaler, Refills: 2         STOP taking these medications       meloxicam (MOBIC) 7.5 mg tablet Comments:   Reason for Stopping:            per medical continuation form      -Follow up in office in 2 weeks      Signed:  Paulina Washburn NP  Orthopaedic Nurse Practitioner    7/6/2022  11:57 AM

## 2022-07-06 NOTE — PROGRESS NOTES
Problem: Mobility Impaired (Adult and Pediatric)  Goal: *Acute Goals and Plan of Care (Insert Text)  Description: FUNCTIONAL STATUS PRIOR TO ADMISSION: Patient was independent and active without use of DME.    HOME SUPPORT PRIOR TO ADMISSION: The patient lived with her supportive . Physical Therapy Goals  Initiated 7/5/2022    1. Patient will move from supine to sit and sit to supine  in bed with modified independence within 4 days. 2. Patient will perform sit to stand with modified independence within 4 days. 3. Patient will ambulate with independence for 200 feet with the least restrictive device within 4 days. 4. Patient will ascend/descend 4 stairs with 1 handrail(s) with modified independence within 4 days. Outcome: Progressing Towards Goal   PHYSICAL THERAPY TREATMENT  Patient: Jena Guadarrama (86 y.o. female)  Date: 7/6/2022  Diagnosis: Herniation of cervical intervertebral disc with radiculopathy [M50.10]  Status post cervical discectomy [Z98.890]  S/P cervical spinal fusion [Z98.1] <principal problem not specified>  Procedure(s) (LRB):  TWO LEVEL ANTERIOR CERVICAL DISCECTOMY AND FUSION C5,6, C6,7 WITH ZERO PROFILE GRAFT AND ALLOGRAFT (N/A) 1 Day Post-Op  Precautions:  Standard   Chart, physical therapy assessment, plan of care and goals were reviewed. ASSESSMENT  Patient continues with skilled PT services and is progressing towards goals. Patient with improved alertness and demonstrated safety with functional mobility. Snyder for in room mobility and hallway ambulation, supervision for stairs. She has a very supportive spouse and is safe to return home. Current Level of Function Impacting Discharge (mobility/balance): Independent to Supervision     Other factors to consider for discharge: supportive spouse          PLAN :  Patient continues to benefit from skilled intervention to address the above impairments. Continue treatment per established plan of care.   to address goals. Recommendation for discharge: (in order for the patient to meet his/her long term goals)  No skilled physical therapy/ follow up rehabilitation needs identified at this time. This discharge recommendation:  Has been made in collaboration with the attending provider and/or case management    IF patient discharges home will need the following DME: none       SUBJECTIVE:   Patient stated I feel like myself again today.     OBJECTIVE DATA SUMMARY:   Critical Behavior:  Neurologic State: Alert  Orientation Level: Oriented X4  Cognition: Appropriate decision making,Appropriate for age attention/concentration,Appropriate safety awareness  Safety/Judgement: Awareness of environment,Good awareness of safety precautions  Functional Mobility Training:  Bed Mobility:  Not assessed; in chair on arrival   Per OT, patient independent      Transfers:  Sit to Stand: Independent  Stand to Sit: Independent              Balance:  Sitting: Intact  Standing: Intact  Ambulation/Gait Training:  Distance (ft): 125 Feet (ft)     Ambulation - Level of Assistance: Independent                       Speed/Veronique: Slow                     Steady with no LOB. No AD needs   Stairs:  Number of Stairs Trained: 4 (step over step to ascend, step to pattern descending)  Stairs - Level of Assistance: Supervision   Rail Use: Both    Pain Rating:  No reports of pain     Activity Tolerance:   Good    After treatment patient left in no apparent distress:   Sitting in chair, Call bell within reach, and Caregiver / family present    COMMUNICATION/COLLABORATION:   The patients plan of care was discussed with: Registered nurse and Orthopedic NP .      Lisbeth Sanchez PT, DPT   Time Calculation: 11 mins

## 2022-07-21 ENCOUNTER — HOSPITAL ENCOUNTER (EMERGENCY)
Age: 62
Discharge: HOME | End: 2022-07-21
Attending: EMERGENCY MEDICINE
Payer: COMMERCIAL

## 2022-07-21 VITALS
HEART RATE: 89 BPM | SYSTOLIC BLOOD PRESSURE: 143 MMHG | DIASTOLIC BLOOD PRESSURE: 77 MMHG | BODY MASS INDEX: 32.12 KG/M2 | TEMPERATURE: 98 F | OXYGEN SATURATION: 98 % | WEIGHT: 163.58 LBS | RESPIRATION RATE: 18 BRPM | HEIGHT: 60 IN

## 2022-07-21 DIAGNOSIS — L23.1 CONTACT DERMATITIS DUE TO ADHESIVES, UNSPECIFIED CONTACT DERMATITIS TYPE: Primary | ICD-10-CM

## 2022-07-21 LAB
ALBUMIN SERPL-MCNC: 3.7 G/DL (ref 3.5–5)
ALBUMIN/GLOB SERPL: 1.1 {RATIO} (ref 1.1–2.2)
ALP SERPL-CCNC: 85 U/L (ref 45–117)
ALT SERPL-CCNC: 25 U/L (ref 12–78)
ANION GAP SERPL CALC-SCNC: 7 MMOL/L (ref 5–15)
AST SERPL-CCNC: 12 U/L (ref 15–37)
BASOPHILS # BLD: 0.1 K/UL (ref 0–0.1)
BASOPHILS NFR BLD: 1 % (ref 0–1)
BILIRUB SERPL-MCNC: 0.3 MG/DL (ref 0.2–1)
BUN SERPL-MCNC: 13 MG/DL (ref 6–20)
BUN/CREAT SERPL: 18 (ref 12–20)
CALCIUM SERPL-MCNC: 8.9 MG/DL (ref 8.5–10.1)
CHLORIDE SERPL-SCNC: 106 MMOL/L (ref 97–108)
CO2 SERPL-SCNC: 27 MMOL/L (ref 21–32)
CREAT SERPL-MCNC: 0.71 MG/DL (ref 0.55–1.02)
DIFFERENTIAL METHOD BLD: ABNORMAL
EOSINOPHIL # BLD: 0.3 K/UL (ref 0–0.4)
EOSINOPHIL NFR BLD: 2 % (ref 0–7)
ERYTHROCYTE [DISTWIDTH] IN BLOOD BY AUTOMATED COUNT: 17.3 % (ref 11.5–14.5)
GLOBULIN SER CALC-MCNC: 3.5 G/DL (ref 2–4)
GLUCOSE SERPL-MCNC: 98 MG/DL (ref 65–100)
HCT VFR BLD AUTO: 33.9 % (ref 35–47)
HGB BLD-MCNC: 10.4 G/DL (ref 11.5–16)
IMM GRANULOCYTES # BLD AUTO: 0 K/UL (ref 0–0.04)
IMM GRANULOCYTES NFR BLD AUTO: 0 % (ref 0–0.5)
LYMPHOCYTES # BLD: 2.2 K/UL (ref 0.8–3.5)
LYMPHOCYTES NFR BLD: 18 % (ref 12–49)
MCH RBC QN AUTO: 22.3 PG (ref 26–34)
MCHC RBC AUTO-ENTMCNC: 30.7 G/DL (ref 30–36.5)
MCV RBC AUTO: 72.6 FL (ref 80–99)
MONOCYTES # BLD: 1 K/UL (ref 0–1)
MONOCYTES NFR BLD: 8 % (ref 5–13)
NEUTS SEG # BLD: 8.6 K/UL (ref 1.8–8)
NEUTS SEG NFR BLD: 71 % (ref 32–75)
NRBC # BLD: 0 K/UL (ref 0–0.01)
NRBC BLD-RTO: 0 PER 100 WBC
PLATELET # BLD AUTO: 512 K/UL (ref 150–400)
PMV BLD AUTO: 9.1 FL (ref 8.9–12.9)
POTASSIUM SERPL-SCNC: 3.5 MMOL/L (ref 3.5–5.1)
PROT SERPL-MCNC: 7.2 G/DL (ref 6.4–8.2)
RBC # BLD AUTO: 4.67 M/UL (ref 3.8–5.2)
SODIUM SERPL-SCNC: 140 MMOL/L (ref 136–145)
WBC # BLD AUTO: 12.2 K/UL (ref 3.6–11)

## 2022-07-21 PROCEDURE — 99283 EMERGENCY DEPT VISIT LOW MDM: CPT

## 2022-07-21 PROCEDURE — 36415 COLL VENOUS BLD VENIPUNCTURE: CPT

## 2022-07-21 PROCEDURE — 80053 COMPREHEN METABOLIC PANEL: CPT

## 2022-07-21 PROCEDURE — 85025 COMPLETE CBC W/AUTO DIFF WBC: CPT

## 2022-07-21 RX ORDER — HYDROXYZINE 50 MG/1
50 TABLET, FILM COATED ORAL
Qty: 20 TABLET | Refills: 0 | Status: SHIPPED | OUTPATIENT
Start: 2022-07-21 | End: 2022-07-31

## 2022-07-21 RX ORDER — PREDNISONE 10 MG/1
TABLET ORAL
Qty: 20 TABLET | Refills: 0 | Status: SHIPPED | OUTPATIENT
Start: 2022-07-21 | End: 2022-08-22 | Stop reason: ALTCHOICE

## 2022-07-21 NOTE — ED PROVIDER NOTES
EMERGENCY DEPARTMENT HISTORY AND PHYSICAL EXAM      Date: 7/21/2022  Patient Name: Julianne Mojica    History of Presenting Illness     Chief Complaint   Patient presents with    Rash     Reports developing rash over surgical incision on her neck that is spreading down her chest. Pt had cervical fusion surgery on 7/5. Surgeon referred pt to ER with rash worsened despite Benadryl and steroids. Pt denied any SOB. History Provided By: Patient    HPI: Julianne Mojica, 62 y.o. female presents to the ED with cc of rash. 28-year-old female presents emergency department chief plaint of rash. Patient underwent surgery with Dr. Kelly Nunn on 7/6. Patient reports discharged. Patient did not require a cervical collar. Subsequently patient noted a erythematous pruritic rash along the surgical incision. Reports continued to spread up her neck and to her left chest.  No fevers but did report subjective chills. Denies any new soaps or detergents. Denies any chest pain or shortness of breath. No mucosal involvement. There are no other complaints, changes, or physical findings at this time. PCP: Rani Camargo MD    No current facility-administered medications on file prior to encounter. Current Outpatient Medications on File Prior to Encounter   Medication Sig Dispense Refill    thyroid, Pork, ([place] Thyroid) 90 mg tablet Take 90 mg by mouth daily. melatonin 5 mg tablet Take 5 mg by mouth nightly. amitriptyline (ELAVIL) 10 mg tablet Take 1 Tablet by mouth nightly. (Patient taking differently: Take 20 mg by mouth nightly.) 60 Tablet 5    Omeprazole delayed release (PRILOSEC D/R) 20 mg tablet Take 10 mg by mouth two (2) times a day. albuterol (PROVENTIL HFA, VENTOLIN HFA, PROAIR HFA) 90 mcg/actuation inhaler Take 1 Puff by inhalation every six (6) hours as needed for Wheezing.  1 Inhaler 2    estradiol-norethindrone (COMBIPATCH) 0.05-0.25 mg/24 hr 1 Patch by TransDERmal route two (2) times a week. Pt will leave patch off Monday nite before surgery  And replace after surgery         Past History     Past Medical History:  Past Medical History:   Diagnosis Date    Anxiety     Arthritis     neck     Asthma 2008    allergy induced in [place]    Autoimmune disease ([facility] [address]     limited systemic sclerosis     Cancer ([facility] [address]     basal cell 20 years ago upper abdomen    Foot fracture     Gall stones     Hypothyroidism     Interstitial cystitis     Dr. Toyin Morales at [facility]    Interstitial cystitis     Kidney stones     Menopausal disorder     Other ill-defined conditions(799.89)     kidney stones in [place]    PONV (postoperative nausea and vomiting)     severe nausea 1x in the past    Psychiatric disorder 11/10    anxiety attack    Thyroid disease        Past Surgical History:  Past Surgical History:   Procedure Laterality Date    HX ADENOIDECTOMY      HX CHOLECYSTECTOMY      HX DILATION AND CURETTAGE      miscarriage x1    HX ENDOSCOPY      HX GI  2010    cholecystectomy    HX GYN      laparoscopy for fertility    HX OTHER SURGICAL  2006    hemithyroidectomy right, gall bladder removal    HX TONSILLECTOMY      ID BREAST SURGERY PROCEDURE UNLISTED      left breast lumpectomy       Family History:  Family History   Problem Relation Age of Onset    Cancer Mother     Heart Disease Father     Heart Disease Brother        Social History:  Social History     Tobacco Use    Smoking status: Former     Packs/day: 1.00     Years: 12.00     Pack years: 12.00     Types: Cigarettes     Quit date: 1990     Years since quittin.4    Smokeless tobacco: Never   Vaping Use    Vaping Use: Never used   Substance Use Topics    Alcohol use: Not Currently     Comment: rarely    Drug use: Never       Allergies:   Allergies   Allergen Reactions    Adhesive Other (comments)     Eat skin off    Codeine Anaphylaxis    Codeine Sulfate Anaphylaxis    Darvon [Propoxyphene] Itching    Percocet [Oxycodone-Acetaminophen] Itching    Sulfa (Sulfonamide Antibiotics) Hives    Vicodin [Hydrocodone-Acetaminophen] Itching    Avocado Swelling    Demerol [Meperidine] Itching    Dilaudid [Hydromorphone] Itching    Lexapro [Escitalopram] Other (comments)     shake    Sulfamethoxazole-Trimethoprim Rash         Review of Systems   Review of Systems   Constitutional:  Negative for activity change, chills and fever. HENT:  Negative for facial swelling and voice change. Eyes:  Negative for redness. Respiratory:  Negative for cough, shortness of breath and wheezing. Cardiovascular:  Negative for chest pain and leg swelling. Gastrointestinal:  Negative for abdominal pain, diarrhea, nausea and vomiting. Genitourinary:  Negative for decreased urine volume. Musculoskeletal:  Negative for gait problem. Skin:  Positive for rash. Negative for pallor. Neurological:  Negative for tremors and facial asymmetry. Psychiatric/Behavioral:  Negative for agitation. All other systems reviewed and are negative. Physical Exam   Physical Exam  Vitals and nursing note reviewed. Constitutional:       Comments: 55-year-old female, resting in stretcher, no acute distress   HENT:      Head: Normocephalic and atraumatic. Neck:      Comments: Along the right anterior neck there is a healing incision that is mildly tender to palpation with no fluctuance. There is a papular rash along the upper neck and right upper chest that is pruritic and blanching. There is no fluctuance. No sloughing of skin. No bleeding. Cardiovascular:      Rate and Rhythm: Normal rate and regular rhythm. Heart sounds: No murmur heard. No friction rub. No gallop. Pulmonary:      Effort: Pulmonary effort is normal.      Breath sounds: Normal breath sounds. Comments: Not hypoxic on room air  Abdominal:      Palpations: Abdomen is soft. Tenderness: There is no abdominal tenderness. Musculoskeletal:         General: No swelling. Normal range of motion.       Cervical back: Normal range of motion. No rigidity. Skin:     General: Skin is warm. Capillary Refill: Capillary refill takes less than 2 seconds. Findings: Rash (As above) present. Neurological:      General: No focal deficit present. Mental Status: She is alert. Psychiatric:         Mood and Affect: Mood normal.       Diagnostic Study Results     Labs -     Recent Results (from the past 12 hour(s))   CBC WITH AUTOMATED DIFF    Collection Time: 07/21/22  5:51 PM   Result Value Ref Range    WBC 12.2 (H) 3.6 - 11.0 K/uL    RBC 4.67 3.80 - 5.20 M/uL    HGB 10.4 (L) 11.5 - 16.0 g/dL    HCT 33.9 (L) 35.0 - 47.0 %    MCV 72.6 (L) 80.0 - 99.0 FL    MCH 22.3 (L) 26.0 - 34.0 PG    MCHC 30.7 30.0 - 36.5 g/dL    RDW 17.3 (H) 11.5 - 14.5 %    PLATELET 577 (H) 435 - 400 K/uL    MPV 9.1 8.9 - 12.9 FL    NRBC 0.0 0  WBC    ABSOLUTE NRBC 0.00 0.00 - 0.01 K/uL    NEUTROPHILS 71 32 - 75 %    LYMPHOCYTES 18 12 - 49 %    MONOCYTES 8 5 - 13 %    EOSINOPHILS 2 0 - 7 %    BASOPHILS 1 0 - 1 %    IMMATURE GRANULOCYTES 0 0.0 - 0.5 %    ABS. NEUTROPHILS 8.6 (H) 1.8 - 8.0 K/UL    ABS. LYMPHOCYTES 2.2 0.8 - 3.5 K/UL    ABS. MONOCYTES 1.0 0.0 - 1.0 K/UL    ABS. EOSINOPHILS 0.3 0.0 - 0.4 K/UL    ABS. BASOPHILS 0.1 0.0 - 0.1 K/UL    ABS. IMM. GRANS. 0.0 0.00 - 0.04 K/UL    DF AUTOMATED     METABOLIC PANEL, COMPREHENSIVE    Collection Time: 07/21/22  5:51 PM   Result Value Ref Range    Sodium 140 136 - 145 mmol/L    Potassium 3.5 3.5 - 5.1 mmol/L    Chloride 106 97 - 108 mmol/L    CO2 27 21 - 32 mmol/L    Anion gap 7 5 - 15 mmol/L    Glucose 98 65 - 100 mg/dL    BUN 13 6 - 20 MG/DL    Creatinine 0.71 0.55 - 1.02 MG/DL    BUN/Creatinine ratio 18 12 - 20      GFR est AA >60 >60 ml/min/1.73m2    GFR est non-AA >60 >60 ml/min/1.73m2    Calcium 8.9 8.5 - 10.1 MG/DL    Bilirubin, total 0.3 0.2 - 1.0 MG/DL    ALT (SGPT) 25 12 - 78 U/L    AST (SGOT) 12 (L) 15 - 37 U/L    Alk.  phosphatase 85 45 - 117 U/L    Protein, total 7.2 6.4 - 8.2 g/dL    Albumin 3.7 3.5 - 5.0 g/dL    Globulin 3.5 2.0 - 4.0 g/dL    A-G Ratio 1.1 1.1 - 2.2         Radiologic Studies -   No orders to display     CT Results  (Last 48 hours)      None          CXR Results  (Last 48 hours)      None            Medical Decision Making   I am the first provider for this patient. I reviewed the vital signs, available nursing notes, past medical history, past surgical history, family history and social history. Vital Signs-Reviewed the patient's vital signs. Patient Vitals for the past 12 hrs:   Temp Pulse Resp BP SpO2   07/21/22 2130 -- -- -- (!) 143/77 --   07/21/22 2115 -- -- -- (!) 141/80 --   07/21/22 1915 -- -- -- 132/80 98 %   07/21/22 1738 98 °F (36.7 °C) 89 18 (!) 147/80 99 %     Records Reviewed: Nursing Notes and Old Medical Records    Provider Notes (Medical Decision Making):     81-year-old female presents emergency department chief complaint of rash. Vitals are unremarkable. This does not appear to be SJS or TEN. Does not appear to be cellulitic and there are no infectious symptoms. No fever. Likely contact dermatitis, consider relation to surgical glue or surgical scrub. No evidence of airway compromise. Do not believe imaging is necessary. Patient had a short burst of steroids. We will treat with a longer taper for dermatologic involvement. We will also increase antihistamines and continue cortisone. Follow-up with PCP or neurosurgery. ED Course:   Initial assessment performed. The patients presenting problems have been discussed, and they are in agreement with the care plan formulated and outlined with them. I have encouraged them to ask questions as they arise throughout their visit. Yehuda Case MD      Disposition:    Discharged    DISCHARGE PLAN:  1.    Discharge Medication List as of 7/21/2022  9:42 PM        START taking these medications    Details   predniSONE (DELTASONE) 10 mg tablet Take 2 tablets daily for 4 days, then take 1-1/2 tablets daily for 4 days, take 1 tablet daily for 4 days and then take 1/2 tablet daily for 4 days. , Normal, Disp-20 Tablet, R-0      hydrOXYzine HCL (ATARAX) 50 mg tablet Take 1 Tablet by mouth every eight (8) hours as needed for Itching for up to 10 days. , Normal, Disp-20 Tablet, R-0           CONTINUE these medications which have NOT CHANGED    Details   thyroid, Pork, ([place] Thyroid) 90 mg tablet Take 90 mg by mouth daily. , Historical Med      melatonin 5 mg tablet Take 5 mg by mouth nightly., Historical Med      amitriptyline (ELAVIL) 10 mg tablet Take 1 Tablet by mouth nightly., No Print, Disp-60 Tablet, R-5      Omeprazole delayed release (PRILOSEC D/R) 20 mg tablet Take 10 mg by mouth two (2) times a day., Historical Med      albuterol (PROVENTIL HFA, VENTOLIN HFA, PROAIR HFA) 90 mcg/actuation inhaler Take 1 Puff by inhalation every six (6) hours as needed for Wheezing., Normal, Disp-1 Inhaler, R-2      estradiol-norethindrone (COMBIPATCH) 0.05-0.25 mg/24 hr 1 Patch by TransDERmal route two (2) times a week. Pt will leave patch off Monday nite before surgery  And replace after surgery, Historical Med           2. Follow-up Information       Follow up With Specialties Details Why Contact Info    Appa Hugh Solo MD Internal Medicine Physician In 3 days  [address] [facility]  [address] [id number]      [address] DEPT Emergency Medicine  If symptoms worsen [address] [place] [address] Drive  [address] [facility]  [phone]          3. Return to ED if worse     Diagnosis     Clinical Impression:   1. Contact dermatitis due to adhesives, unspecified contact dermatitis type        Attestations:    Andrew Davis MD        Please note that this dictation was completed with [employer] the [employer] voice recognition software.   Quite often unanticipated grammatical, syntax, homophones, and other interpretive errors are inadvertently transcribed by the computer software. Please disregard these errors. Please excuse any errors that have escaped final proofreading. Thank you.

## 2022-07-28 ENCOUNTER — OFFICE VISIT (OUTPATIENT)
Dept: INTERNAL MEDICINE CLINIC | Age: 62
End: 2022-07-28
Payer: COMMERCIAL

## 2022-07-28 VITALS
TEMPERATURE: 98.5 F | SYSTOLIC BLOOD PRESSURE: 130 MMHG | HEIGHT: 60 IN | OXYGEN SATURATION: 100 % | HEART RATE: 75 BPM | WEIGHT: 164 LBS | RESPIRATION RATE: 18 BRPM | DIASTOLIC BLOOD PRESSURE: 85 MMHG | BODY MASS INDEX: 32.2 KG/M2

## 2022-07-28 DIAGNOSIS — Z98.1 S/P CERVICAL SPINAL FUSION: ICD-10-CM

## 2022-07-28 DIAGNOSIS — M34.9 SCLERODERMA (HCC): ICD-10-CM

## 2022-07-28 DIAGNOSIS — I10 PRIMARY HYPERTENSION: ICD-10-CM

## 2022-07-28 DIAGNOSIS — D50.9 IRON DEFICIENCY ANEMIA, UNSPECIFIED IRON DEFICIENCY ANEMIA TYPE: ICD-10-CM

## 2022-07-28 DIAGNOSIS — I73.00 RAYNAUD'S DISEASE WITHOUT GANGRENE: Primary | ICD-10-CM

## 2022-07-28 PROCEDURE — 99214 OFFICE O/P EST MOD 30 MIN: CPT | Performed by: INTERNAL MEDICINE

## 2022-07-28 RX ORDER — CETIRIZINE HCL 10 MG
10 TABLET ORAL
COMMUNITY

## 2022-07-28 RX ORDER — MINERAL OIL
180 ENEMA (ML) RECTAL
COMMUNITY

## 2022-07-28 RX ORDER — LISINOPRIL 2.5 MG/1
2.5 TABLET ORAL DAILY
Qty: 30 TABLET | Refills: 1 | Status: SHIPPED | OUTPATIENT
Start: 2022-07-28

## 2022-07-28 NOTE — PROGRESS NOTES
Ms. Stephanie Ramirez is presenting to follow up     CC:  Allergic Reaction (On chest , moved to around arm , dr Minesh Wall said it could be from the glue /)       HPI:    Hospital follow up   Patient had surgery for cervical disk herniation with radiculopathy  C5-6, C6-7. Dr Soni Wagner performed  Two-level anterior cervical diskectomy and fusion with zero-profile biomechanical implant graft and synthetic bone graft C5-6, C6-7 on July 5th     She then had allergic reaction at surgery site and diagnosed with severe contact dermatitis . She took steroids, benadryl and went to ER and she is currently on allegra and zyrtec and atarax and prednisone taper   Suspecting allergy to dermabond  Area feels hot   But overall improving      Of note pre op hemoglobin 9.1  now improved to 10.4  Denies bleeding but does have GAVE       Primary hypertension  - new diagnosis. Since patient has Raynauds choosing an agent that treats that as well. She did not tolerate amlodipine, nifedipine caused swelling and   Losartan - had nausea and felt that would vomit  BP has been 140 range   She is working on weight loss      Recall   Positive SALVADOR and centromere B Ab/positive Raynaud's-noted some skin changes. Patient saw Dr. Roque Koroma. Normal echocardiogram without evidence of pulmonary HTN   She has an EGD on 6/22/2021 which showed GAVE. PFT on 4/02/2021 did not show restrictive disease. Echocardiogram on 4/02/2021 did not show pulmonary hypertension. Her PFTs showed   1. Very mild airflow obstruction. 2.  No restrictive lung disease. 3.  Air trapping is present. 4.  Normal DLCO. 5.  Normal flow-volume loop. CT chest 09/01   Minimal linear scarring at the lung bases. Smoked for very short times in her 25s  less then 10 pack years  Normal stress test in 2018     Raynauds is better in the summer       Up to date mammogram at [place] [facility] and pap smear  Current weight is 170lbs    Hx of hypothyroidism : on armour thyroid and doing well.  Recent TSH in range  No hair loss, or constipation or brittle nails      Review of systems:  Constitutional: negative for fever, chills, weight loss, night sweats   10 systems reviewed and negative other then HPI      Past Medical History:   Diagnosis Date    Anxiety     Arthritis     neck     Asthma 2008    allergy induced in [place]    Autoimmune disease ([facility] [address]     limited systemic sclerosis     Cancer ([facility] [address]     basal cell 20 years ago upper abdomen    Foot fracture     Gall stones     Hypothyroidism     Interstitial cystitis     Dr. Miranda Martinez at [address]    Interstitial cystitis     Kidney stones     Menopausal disorder     Other ill-defined conditions(799.89)     kidney stones in [place]    PONV (postoperative nausea and vomiting)     severe nausea 1x in the past    Psychiatric disorder 11/10    anxiety attack    Thyroid disease         Past Surgical History:   Procedure Laterality Date    HX ADENOIDECTOMY  1977    HX CERVICAL FUSION      HX CHOLECYSTECTOMY      HX DILATION AND CURETTAGE      miscarriage x1    HX ENDOSCOPY      HX GI  2010    cholecystectomy    HX GYN      laparoscopy for fertility    HX OTHER SURGICAL  2006    hemithyroidectomy right, gall bladder removal    HX TONSILLECTOMY  1977    [place] BREAST SURGERY PROCEDURE UNLISTED      left breast lumpectomy       Allergies   Allergen Reactions    Adhesive Other (comments)     Eat skin off    Codeine Anaphylaxis    Codeine Sulfate Anaphylaxis    Darvon [Propoxyphene] Itching    Percocet [Oxycodone-Acetaminophen] Itching    Sulfa (Sulfonamide Antibiotics) Hives    Vicodin [Hydrocodone-Acetaminophen] Itching    Avocado Swelling    Demerol [Meperidine] Itching    Dilaudid [Hydromorphone] Itching    Lexapro [Escitalopram] Other (comments)     shake    Sulfamethoxazole-Trimethoprim Rash       Current Outpatient Medications on File Prior to Visit   Medication Sig Dispense Refill    cetirizine (ZyrTEC) 10 mg tablet Take 10 mg by mouth nightly.       fexofenadine (ALLEGRA) 180 mg tablet Take 180 mg by mouth.      predniSONE (DELTASONE) 10 mg tablet Take 2 tablets daily for 4 days, then take 1-1/2 tablets daily for 4 days, take 1 tablet daily for 4 days and then take 1/2 tablet daily for 4 days. 20 Tablet 0    hydrOXYzine HCL (ATARAX) 50 mg tablet Take 1 Tablet by mouth every eight (8) hours as needed for Itching for up to 10 days. 20 Tablet 0    Omeprazole delayed release (PRILOSEC D/R) 20 mg tablet Take 10 mg by mouth in the morning. albuterol (PROVENTIL HFA, VENTOLIN HFA, PROAIR HFA) 90 mcg/actuation inhaler Take 1 Puff by inhalation every six (6) hours as needed for Wheezing. 1 Inhaler 2    estradiol-norethindrone (COMBIPATCH) 0.05-0.25 mg/24 hr 1 Patch by TransDERmal route two (2) times a week. Pt will leave patch off Monday nite before surgery  And replace after surgery      thyroid, Pork, ([place] Thyroid) 90 mg tablet Take 90 mg by mouth daily. melatonin 5 mg tablet Take 5 mg by mouth nightly. (Patient not taking: Reported on 2022)      amitriptyline (ELAVIL) 10 mg tablet Take 1 Tablet by mouth nightly. (Patient taking differently: Take 20 mg by mouth nightly.) 60 Tablet 5     No current facility-administered medications on file prior to visit. family history includes Cancer in her mother; Heart Disease in her brother and father.     Social History     Socioeconomic History    Marital status:      Spouse name: Not on file    Number of children: Not on file    Years of education: Not on file    Highest education level: Not on file   Occupational History    Not on file   Tobacco Use    Smoking status: Former     Packs/day: 1.00     Years: 12.00     Pack years: 12.00     Types: Cigarettes     Quit date: 1990     Years since quittin.4    Smokeless tobacco: Never   Vaping Use    Vaping Use: Never used   Substance and Sexual Activity    Alcohol use: Not Currently     Comment: rarely    Drug use: Never    Sexual activity: Yes   Other Topics Concern    Not on file Social History 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 E  Neck:  Supple. Surgical scar on left neck has a fainting red rash around it. The area where the Dermabond was applied is raised and feels tight. Respiratory: no respiratory distress,  no wheezing, no rhonchi, no rales. No chest wall tenderness. Cardiovascular:  RRR, normal S1S2, no murmur. Gastrointestinal: normal bowel sounds, soft, nontender, without masses. No hepatosplenomegaly. Extremities +2 pulses, no edema, normal sensation   Musculoskeletal:  Normal gait. Normal digits and nails. Neuro:  A and OX4, fluent speech, cranial nerves normal 2-12.   Psych:  Normal affect      Lab Results   Component Value Date/Time    WBC 12.2 (H) 07/21/2022 05:51 PM    HGB 10.4 (L) 07/21/2022 05:51 PM    HCT 33.9 (L) 07/21/2022 05:51 PM    PLATELET 337 (H) 42/97/9868 05:51 PM    MCV 72.6 (L) 07/21/2022 05:51 PM     Lab Results   Component Value Date/Time    Sodium 140 07/21/2022 05:51 PM    Potassium 3.5 07/21/2022 05:51 PM    Chloride 106 07/21/2022 05:51 PM    CO2 27 07/21/2022 05:51 PM    Anion gap 7 07/21/2022 05:51 PM    Glucose 98 07/21/2022 05:51 PM    BUN 13 07/21/2022 05:51 PM    Creatinine 0.71 07/21/2022 05:51 PM    BUN/Creatinine ratio 18 07/21/2022 05:51 PM    GFR est AA >60 07/21/2022 05:51 PM    GFR est non-AA >60 07/21/2022 05:51 PM Calcium 8.9 07/21/2022 05:51 PM     Lab Results   Component Value Date/Time    Cholesterol, total 196 02/10/2022 08:36 AM    HDL Cholesterol 59 02/10/2022 08:36 AM    LDL, calculated 123 (H) 02/10/2022 08:36 AM    LDL, calculated 113 (H) 12/31/2019 08:55 AM    VLDL, calculated 14 02/10/2022 08:36 AM    VLDL, calculated 11 12/31/2019 08:55 AM    Triglyceride 75 02/10/2022 08:36 AM     Lab Results   Component Value Date/Time    TSH 1.800 02/10/2022 08:36 AM     Lab Results   Component Value Date/Time    Hemoglobin A1c 5.2 06/23/2022 04:09 PM     Lab Results   Component Value Date/Time    VITAMIN D, 25-HYDROXY 46.0 12/31/2019 08:55 AM                   Assessment and Plan:   1. Raynaud's disease without gangrene  Nifedipine caused swelling, did not tolerate amlodipine  Currently doing better in the summer     2. S/P cervical spinal fusion  Per Dr Chuck Casiano    3. Primary hypertension  Nifedipine caused swelling, did not tolerate amlodipine, losartan made her feel sick  - lisinopriL (PRINIVIL, ZESTRIL) 2.5 mg tablet; Take 1 Tablet by mouth in the morning. Dispense: 30 Tablet; Refill: 1    4. 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 Iron deficiency anemia, unspecified iron deficiency anemia type  Hemoglobin of 9 prior to to surgery post op 10, unclear if 9 was correct, no bleeding   Repeat studies and check iron   - CBC WITH AUTOMATED DIFF; Future  - FERRITIN; Future  - IRON PROFILE;  Future  - IRON PROFILE  - FERRITIN  - CBC WITH AUTOMATED DIFF      advised to add pepcid treating allergy reaction     James Resendiz MD

## 2022-07-28 NOTE — PROGRESS NOTES
.1. Have you been to the ER, urgent care clinic since your last visit? Hospitalized since your last visit? Yes When: 7/21/22     Surgery to spine   Allergic reaction since then   Says has bleeding in stomach and anemic     2. Have you seen or consulted any other health care providers outside of the [address] [place] [address] since your last visit? Include any pap smears or colon screening.  No            Em lpn

## 2022-07-29 LAB
BASOPHILS # BLD: 0.1 K/UL (ref 0–0.1)
BASOPHILS NFR BLD: 1 % (ref 0–1)
DIFFERENTIAL METHOD BLD: ABNORMAL
EOSINOPHIL # BLD: 0.3 K/UL (ref 0–0.4)
EOSINOPHIL NFR BLD: 2 % (ref 0–7)
ERYTHROCYTE [DISTWIDTH] IN BLOOD BY AUTOMATED COUNT: 19.2 % (ref 11.5–14.5)
FERRITIN SERPL-MCNC: 7 NG/ML (ref 8–252)
HCT VFR BLD AUTO: 38.4 % (ref 35–47)
HGB BLD-MCNC: 11.1 G/DL (ref 11.5–16)
IMM GRANULOCYTES # BLD AUTO: 0 K/UL (ref 0–0.04)
IMM GRANULOCYTES NFR BLD AUTO: 0 % (ref 0–0.5)
IRON SATN MFR SERPL: 5 % (ref 20–50)
IRON SERPL-MCNC: 24 UG/DL (ref 35–150)
LYMPHOCYTES # BLD: 2.5 K/UL (ref 0.8–3.5)
LYMPHOCYTES NFR BLD: 20 % (ref 12–49)
MCH RBC QN AUTO: 22.5 PG (ref 26–34)
MCHC RBC AUTO-ENTMCNC: 28.9 G/DL (ref 30–36.5)
MCV RBC AUTO: 77.7 FL (ref 80–99)
MONOCYTES # BLD: 1.3 K/UL (ref 0–1)
MONOCYTES NFR BLD: 10 % (ref 5–13)
NEUTS SEG # BLD: 8.4 K/UL (ref 1.8–8)
NEUTS SEG NFR BLD: 67 % (ref 32–75)
NRBC # BLD: 0 K/UL (ref 0–0.01)
NRBC BLD-RTO: 0 PER 100 WBC
PLATELET # BLD AUTO: 515 K/UL (ref 150–400)
PLATELET COMMENTS,PCOM: ABNORMAL
PMV BLD AUTO: 9.5 FL (ref 8.9–12.9)
RBC # BLD AUTO: 4.94 M/UL (ref 3.8–5.2)
RBC MORPH BLD: ABNORMAL
TIBC SERPL-MCNC: 462 UG/DL (ref 250–450)
WBC # BLD AUTO: 12.6 K/UL (ref 3.6–11)

## 2022-07-29 NOTE — PROGRESS NOTES
Labs looks like iron deficient anemia   However the blood count is improving and very close to normal  Since you are so sensitive to medication I think best to avoid supplement and increase iron intake in foods ( such as meats red meat, spinach, legumes, pumpkin seeds)

## 2022-08-22 ENCOUNTER — OFFICE VISIT (OUTPATIENT)
Dept: INTERNAL MEDICINE CLINIC | Age: 62
End: 2022-08-22
Payer: COMMERCIAL

## 2022-08-22 VITALS
DIASTOLIC BLOOD PRESSURE: 86 MMHG | BODY MASS INDEX: 28.24 KG/M2 | HEART RATE: 93 BPM | TEMPERATURE: 97.4 F | SYSTOLIC BLOOD PRESSURE: 146 MMHG | WEIGHT: 165.4 LBS | HEIGHT: 64 IN | RESPIRATION RATE: 18 BRPM | OXYGEN SATURATION: 99 %

## 2022-08-22 DIAGNOSIS — Z98.1 S/P CERVICAL SPINAL FUSION: Primary | ICD-10-CM

## 2022-08-22 DIAGNOSIS — R22.1 NECK FULLNESS: ICD-10-CM

## 2022-08-22 DIAGNOSIS — D50.9 IRON DEFICIENCY ANEMIA, UNSPECIFIED IRON DEFICIENCY ANEMIA TYPE: ICD-10-CM

## 2022-08-22 PROCEDURE — 99214 OFFICE O/P EST MOD 30 MIN: CPT | Performed by: INTERNAL MEDICINE

## 2022-08-22 NOTE — PROGRESS NOTES
1. \"Have you been to the ER, urgent care clinic since your last visit? Hospitalized since your last visit? \" No    2. \"Have you seen or consulted any other health care providers outside of the [address] [place] [address] since your last visit? \" No     3. For patients aged 39-70: Has the patient had a colonoscopy / FIT/ Cologuard?  No

## 2022-08-22 NOTE — PROGRESS NOTES
Ms. Betsy Pierre is presenting to follow up     CC:  Anemia       HPI:  Recall patient was seen July 28th recall   Patient had surgery for cervical disk herniation with radiculopathy  C5-6, C6-7. Dr Austin Fowler performed  Two-level anterior cervical diskectomy and fusion with zero-profile biomechanical implant graft and synthetic bone graft C5-6, C6-7 on July 5th     She then had allergic reaction at surgery site and diagnosed with severe contact dermatitis . She took steroids, benadryl and went to ER and she is currently on allegra and zyrtec and atarax and prednisone taper   Suspecting allergy to dermabond        Of note pre op hemoglobin 9.1  now improved to 11.1  Denies bleeding but does have GAVE, no blood in the stool  Patient wonders if iron deficiency related to taking omeprazole BID     Patient left visit July 28th , August 10th she noted a red area with pus on the inner end of the incision and was given keflex improved with 3 days and then saw Dr Stephanie Herndon and stopped medication. Today she notes a small area of redness again 3 days ago and noted serosanguinous ( clear and bloody)   Denies fever but has frequent chills  She had loose stools with keflex and does not want to continue taking it  HTN   - new diagnosis. Since patient has Raynauds choosing an agent that treats that as well. She did not tolerate amlodipine, nifedipine caused swelling and   Losartan - had nausea and felt that would vomit  BP has been 140 range   She is working on weight loss  I prescribed lisinopril but she has been scare to start it       Recall   Positive SALVADOR and centromere B Ab/positive Raynaud's-noted some skin changes. Patient saw Dr. Ethel Ansari. Normal echocardiogram without evidence of pulmonary HTN   She has an EGD on 6/22/2021 which showed GAVE. PFT on 4/02/2021 did not show restrictive disease. Echocardiogram on 4/02/2021 did not show pulmonary hypertension. Her PFTs showed   1. Very mild airflow obstruction.   2.  No restrictive lung disease. 3.  Air trapping is present. 4.  Normal DLCO. 5.  Normal flow-volume loop. CT chest 09/01   Minimal linear scarring at the lung bases. Smoked for very short times in her 25s  less then 10 pack years  Normal stress test in 2018     Raynauds is better in the summer       Up to date mammogram at [place] [facility] and pap smear  Current weight is 170lbs    Hx of hypothyroidism : on armour thyroid and doing well.  Recent TSH in range  No hair loss, or constipation or brittle nails      Review of systems:  Constitutional: negative for fever, chills, weight loss, night sweats   10 systems reviewed and negative other then HPI      Past Medical History:   Diagnosis Date    Anxiety     Arthritis     neck     Asthma 2008    allergy induced in [place]    Autoimmune disease ([facility] [address]     limited systemic sclerosis     Cancer ([facility] [address]     basal cell 20 years ago upper abdomen    Foot fracture     Gall stones     Hypothyroidism     Interstitial cystitis     Dr. Deal Massing at [address]    Interstitial cystitis     Kidney stones     Menopausal disorder     Other ill-defined conditions(799.89)     kidney stones in pass    PONV (postoperative nausea and vomiting)     severe nausea 1x in the past    Psychiatric disorder 11/10    anxiety attack    Thyroid disease         Past Surgical History:   Procedure Laterality Date    HX ADENOIDECTOMY  1977    HX CERVICAL FUSION      HX CHOLECYSTECTOMY      HX DILATION AND CURETTAGE      miscarriage x1    HX ENDOSCOPY      HX GI  2010    cholecystectomy    HX GYN      laparoscopy for fertility    HX OTHER SURGICAL  2006    hemithyroidectomy right, gall bladder removal    HX TONSILLECTOMY  1977    [place] BREAST SURGERY PROCEDURE UNLISTED      left breast lumpectomy       Allergies   Allergen Reactions    Adhesive Other (comments)     Eat skin off    Codeine Anaphylaxis    Codeine Sulfate Anaphylaxis    Darvon [Propoxyphene] Itching    Percocet [Oxycodone-Acetaminophen] Itching    Sulfa (Sulfonamide Antibiotics) Hives    Vicodin [Hydrocodone-Acetaminophen] Itching    Avocado Swelling    Demerol [Meperidine] Itching    Dilaudid [Hydromorphone] Itching    Lexapro [Escitalopram] Other (comments)     shake    Sulfamethoxazole-Trimethoprim Rash       Current Outpatient Medications on File Prior to Visit   Medication Sig Dispense Refill    cetirizine (ZYRTEC) 10 mg tablet Take 10 mg by mouth nightly. fexofenadine (ALLEGRA) 180 mg tablet Take 180 mg by mouth.      lisinopriL (PRINIVIL, ZESTRIL) 2.5 mg tablet Take 1 Tablet by mouth in the morning. 30 Tablet 1    thyroid, Pork, (ARMOUR) 90 mg tablet Take 90 mg by mouth daily. Omeprazole delayed release (PRILOSEC D/R) 20 mg tablet Take 10 mg by mouth in the morning. albuterol (PROVENTIL HFA, VENTOLIN HFA, PROAIR HFA) 90 mcg/actuation inhaler Take 1 Puff by inhalation every six (6) hours as needed for Wheezing. 1 Inhaler 2    estradiol-norethindrone (COMBIPATCH) 0.05-0.25 mg/24 hr 1 Patch by TransDERmal route two (2) times a week. Pt will leave patch off Monday nite before surgery  And replace after surgery       No current facility-administered medications on file prior to visit. family history includes Cancer in her mother; Heart Disease in her brother and father.     Social History     Socioeconomic History    Marital status:      Spouse name: Not on file    Number of children: Not on file    Years of education: Not on file    Highest education level: Not on file   Occupational History    Not on file   Tobacco Use    Smoking status: Former     Packs/day: 1.00     Years: 12.00     Pack years: 12.00     Types: Cigarettes     Quit date: 1990     Years since quittin.5    Smokeless tobacco: Never   Vaping Use    Vaping Use: Never used   Substance and Sexual Activity    Alcohol use: Not Currently     Comment: rarely    Drug use: Never    Sexual activity: Yes   Other Topics Concern    Not on file   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      Social Determinants of Health     Financial Resource Strain: Low Risk     Difficulty of Paying Living Expenses: Not hard at all   Food Insecurity: No Food Insecurity    Worried About Running Out of Food in the Last Year: Never true    Ran Out of Food in the Last Year: Never true   Transportation Needs: Not on file   Physical Activity: Not on file   Stress: Not on file   Social Connections: Not on file   Intimate Partner Violence: Not on file   Housing Stability: Not on file       Visit Vitals  BP (!) 146/86 (BP 1 Location: Right arm, BP Patient Position: Sitting, BP Cuff Size: Adult)   Pulse 93   Temp 97.4 °F (36.3 °C)   Resp 18   Ht 5' 4\" (1.626 m)   Wt 165 lb 6.4 oz (75 kg)   LMP 12/23/2011   SpO2 99%   BMI 28.39 kg/m²     General:  Well appearing female no acute distress  HEENT:   PERRL,normal conjunctiva. E  Neck:  Supple. Surgical scar on left neck has areas of fullness below incision - mild tenderness and mild redness, no warmth    Respiratory: no respiratory distress,  no wheezing, no rhonchi, no rales. No chest wall tenderness. Cardiovascular:  RRR, normal S1S2, no murmur. Gastrointestinal: normal bowel sounds, soft, nontender, without masses. No hepatosplenomegaly. Extremities +2 pulses, no edema, normal sensation   Musculoskeletal:  Normal gait. Normal digits and nails. Neuro:  A and OX4, fluent speech, cranial nerves normal 2-12.   Psych:  Normal affect      Lab Results   Component Value Date/Time    WBC 12.6 (H) 07/28/2022 09:25 AM    HGB 11.1 (L) 07/28/2022 09:25 AM    HCT 38.4 07/28/2022 09:25 AM    PLATELET 067 (H) 50/70/8862 09:25 AM    MCV 77.7 (L) 07/28/2022 09:25 AM     Lab Results   Component Value Date/Time    Sodium 140 07/21/2022 05:51 PM    Potassium 3.5 07/21/2022 05:51 PM    Chloride 106 07/21/2022 05:51 PM    CO2 27 07/21/2022 05:51 PM    Anion gap 7 07/21/2022 05:51 PM    Glucose 98 07/21/2022 05:51 PM    BUN 13 07/21/2022 05:51 PM    Creatinine 0.71 07/21/2022 05:51 PM    BUN/Creatinine ratio 18 07/21/2022 05:51 PM    GFR est AA >60 07/21/2022 05:51 PM    GFR est non-AA >60 07/21/2022 05:51 PM    Calcium 8.9 07/21/2022 05:51 PM     Lab Results   Component Value Date/Time    Cholesterol, total 196 02/10/2022 08:36 AM    HDL Cholesterol 59 02/10/2022 08:36 AM    LDL, calculated 123 (H) 02/10/2022 08:36 AM    LDL, calculated 113 (H) 12/31/2019 08:55 AM    VLDL, calculated 14 02/10/2022 08:36 AM    VLDL, calculated 11 12/31/2019 08:55 AM    Triglyceride 75 02/10/2022 08:36 AM     Lab Results   Component Value Date/Time    TSH 1.800 02/10/2022 08:36 AM     Lab Results   Component Value Date/Time    Hemoglobin A1c 5.2 06/23/2022 04:09 PM     Lab Results   Component Value Date/Time    VITAMIN D, 25-HYDROXY 46.0 12/31/2019 08:55 AM                   Assessment and Plan:     S/P cervical spinal fusion  Neck fullness  Under areas of incision, recently took keflex for an area that looked infected, after 3 days cleared and patient stopped due to side effects, however there is fullness under scar raising suspicion for possible infection. Patient denies fever, does have chills occasionally   Will obtain US and labs   - CBC WITH AUTOMATED DIFF; Future  - US HEAD NECK SOFT TISSUE; Future  - METABOLIC PANEL, BASIC; Future    Iron deficiency anemia, unspecified iron deficiency anemia type  - CBC  - METABOLIC PANEL, BASIC; Future     Raynaud's disease without gangrene  Nifedipine caused swelling, did not tolerate amlodipine  Currently doing better in the summer      Primary hypertension  Nifedipine caused swelling, did not tolerate amlodipine, losartan made her feel sick  - lisinopriL (PRINIVIL, ZESTRIL) 2.5 mg tablet; Take 1 Tablet by mouth in the morning. Dispense: 30 Tablet;  Refill: 1    Scleroderma (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

## 2022-08-22 NOTE — PATIENT INSTRUCTIONS
Lets do labs today and US of the neck to ensure there is no infection in the surgical site    Take pepcid up to twice a day ( 20mg twice a day)

## 2022-08-23 ENCOUNTER — HOSPITAL ENCOUNTER (OUTPATIENT)
Dept: ULTRASOUND IMAGING | Age: 62
Discharge: HOME | End: 2022-08-23
Attending: INTERNAL MEDICINE
Payer: COMMERCIAL

## 2022-08-23 DIAGNOSIS — Z98.1 S/P CERVICAL SPINAL FUSION: ICD-10-CM

## 2022-08-23 DIAGNOSIS — R22.1 NECK FULLNESS: ICD-10-CM

## 2022-08-23 LAB
ANION GAP SERPL CALC-SCNC: 7 MMOL/L (ref 5–15)
BASOPHILS # BLD: 0.1 K/UL (ref 0–0.1)
BASOPHILS NFR BLD: 1 % (ref 0–1)
BUN SERPL-MCNC: 17 MG/DL (ref 6–20)
BUN/CREAT SERPL: 27 (ref 12–20)
CALCIUM SERPL-MCNC: 9.4 MG/DL (ref 8.5–10.1)
CHLORIDE SERPL-SCNC: 107 MMOL/L (ref 97–108)
CO2 SERPL-SCNC: 26 MMOL/L (ref 21–32)
CREAT SERPL-MCNC: 0.63 MG/DL (ref 0.55–1.02)
DIFFERENTIAL METHOD BLD: ABNORMAL
EOSINOPHIL # BLD: 0.2 K/UL (ref 0–0.4)
EOSINOPHIL NFR BLD: 2 % (ref 0–7)
ERYTHROCYTE [DISTWIDTH] IN BLOOD BY AUTOMATED COUNT: 21.8 % (ref 11.5–14.5)
GLUCOSE SERPL-MCNC: 95 MG/DL (ref 65–100)
HCT VFR BLD AUTO: 39.6 % (ref 35–47)
HGB BLD-MCNC: 12 G/DL (ref 11.5–16)
IMM GRANULOCYTES # BLD AUTO: 0 K/UL (ref 0–0.04)
IMM GRANULOCYTES NFR BLD AUTO: 0 % (ref 0–0.5)
LYMPHOCYTES # BLD: 1.2 K/UL (ref 0.8–3.5)
LYMPHOCYTES NFR BLD: 15 % (ref 12–49)
MCH RBC QN AUTO: 23.8 PG (ref 26–34)
MCHC RBC AUTO-ENTMCNC: 30.3 G/DL (ref 30–36.5)
MCV RBC AUTO: 78.6 FL (ref 80–99)
MONOCYTES # BLD: 0.8 K/UL (ref 0–1)
MONOCYTES NFR BLD: 10 % (ref 5–13)
NEUTS SEG # BLD: 5.9 K/UL (ref 1.8–8)
NEUTS SEG NFR BLD: 72 % (ref 32–75)
NRBC # BLD: 0 K/UL (ref 0–0.01)
NRBC BLD-RTO: 0 PER 100 WBC
PLATELET # BLD AUTO: 457 K/UL (ref 150–400)
PMV BLD AUTO: 10 FL (ref 8.9–12.9)
POTASSIUM SERPL-SCNC: 3.8 MMOL/L (ref 3.5–5.1)
RBC # BLD AUTO: 5.04 M/UL (ref 3.8–5.2)
RBC MORPH BLD: ABNORMAL
SODIUM SERPL-SCNC: 140 MMOL/L (ref 136–145)
WBC # BLD AUTO: 8.2 K/UL (ref 3.6–11)

## 2022-08-23 PROCEDURE — 76536 US EXAM OF HEAD AND NECK: CPT

## 2022-09-12 ENCOUNTER — OFFICE VISIT (OUTPATIENT)
Dept: INTERNAL MEDICINE CLINIC | Age: 62
End: 2022-09-12
Payer: COMMERCIAL

## 2022-09-12 VITALS
WEIGHT: 170.6 LBS | OXYGEN SATURATION: 99 % | SYSTOLIC BLOOD PRESSURE: 139 MMHG | TEMPERATURE: 97.6 F | DIASTOLIC BLOOD PRESSURE: 88 MMHG | RESPIRATION RATE: 18 BRPM | HEIGHT: 60 IN | HEART RATE: 60 BPM | BODY MASS INDEX: 33.49 KG/M2

## 2022-09-12 DIAGNOSIS — Z98.890 STATUS POST CERVICAL DISCECTOMY: ICD-10-CM

## 2022-09-12 DIAGNOSIS — I10 ESSENTIAL HYPERTENSION: Primary | ICD-10-CM

## 2022-09-12 DIAGNOSIS — M47.22 CERVICAL RADICULOPATHY DUE TO DEGENERATIVE JOINT DISEASE OF SPINE: ICD-10-CM

## 2022-09-12 PROCEDURE — 99214 OFFICE O/P EST MOD 30 MIN: CPT | Performed by: INTERNAL MEDICINE

## 2022-09-12 RX ORDER — FAMOTIDINE 20 MG/1
20 TABLET, FILM COATED ORAL 2 TIMES DAILY
Qty: 60 TABLET | Refills: 2 | Status: SHIPPED | OUTPATIENT
Start: 2022-09-12

## 2022-09-12 NOTE — PROGRESS NOTES
Ms. Dean Dodge is presenting to follow up     CC:  Blood Pressure Check       HPI:  Recall patient was seen July 28th recall   Patient had surgery for cervical disk herniation with radiculopathy  C5-6, C6-7. Dr Blanca Payton performed  Two-level anterior cervical diskectomy and fusion with zero-profile biomechanical implant graft and synthetic bone graft C5-6, C6-7 on July 5th     She then had allergic reaction at surgery site and diagnosed with severe contact dermatitis . She took steroids, benadryl and took two rounds of antibiotics . 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 Since patient has Raynauds choosing an agent that treats that as well. She did not tolerate amlodipine, nifedipine caused swelling and   Losartan - had nausea and felt that would vomit  BP has been 140 range   She is working on weight loss  I prescribed lisinopril but she has been scare to start it     Recall   Positive SALVADOR and centromere B Ab/positive Raynaud's-noted some skin changes. Patient saw Dr. Kevin Ramirez. Normal echocardiogram without evidence of pulmonary HTN   She has an EGD on 6/22/2021 which showed GAVE. PFT on 4/02/2021 did not show restrictive disease. Echocardiogram on 4/02/2021 did not show pulmonary hypertension. Her PFTs showed   1. Very mild airflow obstruction. 2.  No restrictive lung disease. 3.  Air trapping is present. 4.  Normal DLCO. 5.  Normal flow-volume loop. CT chest 09/01   Minimal linear scarring at the lung bases.   Smoked for very short times in her 20s  less then 10 pack years  Normal stress test in 2018     Raynauds is better in the summer       Up to date mammogram at [place] [facility] and pap smear  Current weight is 170lbs    Hx of hypothyroidism : on armour thyroid and doing well.  Recent TSH in range  No hair loss, or constipation or brittle nails      Review of systems:  Constitutional: negative for fever, chills, weight loss, night sweats   10 systems reviewed and negative other then HPI      Past Medical History:   Diagnosis Date    Anxiety     Arthritis     neck     Asthma 2008    allergy induced in [place]    Autoimmune disease ([facility] [address]     limited systemic sclerosis     Cancer ([address]     basal cell 20 years ago upper abdomen    Foot fracture     Gall stones     Hypothyroidism     Interstitial cystitis     Dr. Danielle Emery at [address]    Interstitial cystitis     Kidney stones     Menopausal disorder     Other ill-defined conditions(799.89)     kidney stones in pass    PONV (postoperative nausea and vomiting)     severe nausea 1x in the past    Psychiatric disorder 11/10    anxiety attack    Thyroid disease         Past Surgical History:   Procedure Laterality Date    HX ADENOIDECTOMY  1977    HX CERVICAL FUSION      HX CHOLECYSTECTOMY      HX DILATION AND CURETTAGE      miscarriage x1    HX ENDOSCOPY      HX GI  2010    cholecystectomy    HX GYN      laparoscopy for fertility    HX OTHER SURGICAL  2006    hemithyroidectomy right, gall bladder removal    HX TONSILLECTOMY  1977    [place] BREAST SURGERY PROCEDURE UNLISTED      left breast lumpectomy       Allergies   Allergen Reactions    Adhesive Other (comments)     Eat skin off    Codeine Anaphylaxis    Codeine Sulfate Anaphylaxis    Darvon [Propoxyphene] Itching    Percocet [Oxycodone-Acetaminophen] Itching    Sulfa (Sulfonamide Antibiotics) Hives    Vicodin [Hydrocodone-Acetaminophen] Itching    Avocado Swelling    Demerol [Meperidine] Itching    Dilaudid [Hydromorphone] Itching    Lexapro [Escitalopram] Other (comments)     shake    Sulfamethoxazole-Trimethoprim Rash       Current Outpatient Medications on File Prior to Visit Medication Sig Dispense Refill    cetirizine (ZYRTEC) 10 mg tablet Take 10 mg by mouth nightly. fexofenadine (ALLEGRA) 180 mg tablet Take 180 mg by mouth. thyroid, Pork, (ARMOUR) 90 mg tablet Take 90 mg by mouth daily. Omeprazole delayed release (PRILOSEC D/R) 20 mg tablet Take 10 mg by mouth in the morning. albuterol (PROVENTIL HFA, VENTOLIN HFA, PROAIR HFA) 90 mcg/actuation inhaler Take 1 Puff by inhalation every six (6) hours as needed for Wheezing. 1 Inhaler 2    estradiol-norethindrone (COMBIPATCH) 0.05-0.25 mg/24 hr 1 Patch by TransDERmal route two (2) times a week. Pt will leave patch off Monday nite before surgery  And replace after surgery      lisinopriL (PRINIVIL, ZESTRIL) 2.5 mg tablet Take 1 Tablet by mouth in the morning. (Patient not taking: Reported on 2022) 30 Tablet 1     No current facility-administered medications on file prior to visit. family history includes Cancer in her mother; Heart Disease in her brother and father. Social History     Socioeconomic History    Marital status:      Spouse name: Not on file    Number of children: Not on file    Years of education: Not on file    Highest education level: Not on file   Occupational History    Not on file   Tobacco Use    Smoking status: Former     Packs/day: 1.00     Years: 12.00     Pack years: 12.00     Types: Cigarettes     Quit date: 1990     Years since quittin.6    Smokeless tobacco: Never   Vaping Use    Vaping Use: Never used   Substance and Sexual Activity    Alcohol use: Not Currently     Comment: rarely    Drug use: Never    Sexual activity: Yes   Other Topics Concern    Not on file   Social History Narrative    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   Living with      Social Determinants of Health     Financial Resource Strain: Low Risk     Difficulty of Paying Living Expenses: Not hard at all   Food Insecurity: No Food Insecurity    Worried About [address] in the Last Year: Never true    Ran Out of Food in the Last Year: Never true   Transportation Needs: Not on file   Physical Activity: Not on file   Stress: Not on file   Social Connections: Not on file   Intimate Partner Violence: Not on file   Housing Stability: Not on file       Visit Vitals  BP (!) 144/89 (BP 1 Location: Right arm, BP Patient Position: Sitting, BP Cuff Size: Adult)   Pulse 60   Temp 97.6 °F (36.4 °C) (Axillary)   Resp 18   Ht 5' (1.524 m)   Wt 170 lb 9.6 oz (77.4 kg)   LMP 12/23/2011   SpO2 99%   BMI 33.32 kg/m²     General:  Well appearing female no acute distress  HEENT:   PERRL,normal conjunctiva. E  Neck:  Supple. Surgical scar on left neck has areas of fullness below incision - mild tenderness and mild redness, no warmth    Respiratory: no respiratory distress,  no wheezing, no rhonchi, no rales. No chest wall tenderness. Cardiovascular:  RRR, normal S1S2, no murmur. Gastrointestinal: normal bowel sounds, soft, nontender, without masses. No hepatosplenomegaly. Extremities +2 pulses, no edema, normal sensation   Musculoskeletal:  Normal gait. Normal digits and nails. Neuro:  A and OX4, fluent speech, cranial nerves normal 2-12.   Psych:  Normal affect      Lab Results   Component Value Date/Time    WBC 8.2 08/22/2022 09:54 AM    HGB 12.0 08/22/2022 09:54 AM    HCT 39.6 08/22/2022 09:54 AM    PLATELET 154 (H) 84/97/3661 09:54 AM    MCV 78.6 (L) 08/22/2022 09:54 AM     Lab Results   Component Value Date/Time    Sodium 140 08/22/2022 09:54 AM    Potassium 3.8 08/22/2022 09:54 AM    Chloride 107 08/22/2022 09:54 AM    CO2 26 08/22/2022 09:54 AM    Anion gap 7 08/22/2022 09:54 AM    Glucose 95 08/22/2022 09:54 AM    BUN 17 08/22/2022 09:54 AM    Creatinine 0.63 08/22/2022 09:54 AM    BUN/Creatinine ratio 27 (H) 08/22/2022 09:54 AM    GFR est AA >60 08/22/2022 09:54 AM    GFR est non-AA >60 08/22/2022 09:54 AM    Calcium 9.4 08/22/2022 09:54 AM     Lab Results   Component Value Date/Time Cholesterol, total 196 02/10/2022 08:36 AM    HDL Cholesterol 59 02/10/2022 08:36 AM    LDL, calculated 123 (H) 02/10/2022 08:36 AM    LDL, calculated 113 (H) 12/31/2019 08:55 AM    VLDL, calculated 14 02/10/2022 08:36 AM    VLDL, calculated 11 12/31/2019 08:55 AM    Triglyceride 75 02/10/2022 08:36 AM     Lab Results   Component Value Date/Time    TSH 1.800 02/10/2022 08:36 AM     Lab Results   Component Value Date/Time    Hemoglobin A1c 5.2 06/23/2022 04:09 PM     Lab Results   Component Value Date/Time    VITAMIN D, 25-HYDROXY 46.0 12/31/2019 08:55 AM                   Assessment and Plan:     S/P cervical spinal fusion      Iron deficiency anemia resolved after stopping PPI     Raynaud's disease without gangrene  Nifedipine caused swelling, did not tolerate amlodipine  Currently doing better in the summer      Primary hypertension  Nifedipine caused swelling, did not tolerate amlodipine, losartan made her feel sick  - lisinopriL (PRINIVIL, ZESTRIL) 2.5 mg tablet; Take 1 Tablet by mouth in the morning. 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

## 2022-10-13 NOTE — DISCHARGE INSTRUCTIONS
You were seen in the ER for your symptoms. Please take Allegra in the morning and Zyrtec in the evening. You can use Atarax or Benadryl for itching in between. Please take the longer steroid taper. Please follow-up with your primary care doctor. Please return for new or worsening symptoms at any time especially fever or drainage. ,DirectAddress_Unknown

## 2022-10-15 LAB
ALBUMIN SERPL-MCNC: 4.6 G/DL (ref 3.8–4.8)
ALBUMIN/GLOB SERPL: 2.3 {RATIO} (ref 1.2–2.2)
ALP SERPL-CCNC: 67 IU/L (ref 44–121)
ALT SERPL-CCNC: 14 IU/L (ref 0–32)
AST SERPL-CCNC: 16 IU/L (ref 0–40)
BASOPHILS # BLD AUTO: 0.1 X10E3/UL (ref 0–0.2)
BASOPHILS NFR BLD AUTO: 1 %
BILIRUB SERPL-MCNC: 0.3 MG/DL (ref 0–1.2)
BUN SERPL-MCNC: 14 MG/DL (ref 8–27)
BUN/CREAT SERPL: 19 (ref 12–28)
CALCIUM SERPL-MCNC: 9.5 MG/DL (ref 8.7–10.3)
CHLORIDE SERPL-SCNC: 103 MMOL/L (ref 96–106)
CO2 SERPL-SCNC: 20 MMOL/L (ref 20–29)
CREAT SERPL-MCNC: 0.72 MG/DL (ref 0.57–1)
CRP SERPL-MCNC: <1 MG/L (ref 0–10)
EGFR: 95 ML/MIN/1.73
EOSINOPHIL # BLD AUTO: 0.1 X10E3/UL (ref 0–0.4)
EOSINOPHIL NFR BLD AUTO: 2 %
ERYTHROCYTE [DISTWIDTH] IN BLOOD BY AUTOMATED COUNT: 17.7 % (ref 11.7–15.4)
ERYTHROCYTE [SEDIMENTATION RATE] IN BLOOD BY WESTERGREN METHOD: 6 MM/HR (ref 0–40)
GLOBULIN SER CALC-MCNC: 2 G/DL (ref 1.5–4.5)
GLUCOSE SERPL-MCNC: 88 MG/DL (ref 70–99)
HCT VFR BLD AUTO: 41.9 % (ref 34–46.6)
HGB BLD-MCNC: 13 G/DL (ref 11.1–15.9)
IMM GRANULOCYTES # BLD AUTO: 0 X10E3/UL (ref 0–0.1)
IMM GRANULOCYTES NFR BLD AUTO: 0 %
LDH SERPL-CCNC: 176 IU/L (ref 119–226)
LYMPHOCYTES # BLD AUTO: 1.9 X10E3/UL (ref 0.7–3.1)
LYMPHOCYTES NFR BLD AUTO: 26 %
MCH RBC QN AUTO: 24.7 PG (ref 26.6–33)
MCHC RBC AUTO-ENTMCNC: 31 G/DL (ref 31.5–35.7)
MCV RBC AUTO: 80 FL (ref 79–97)
MONOCYTES # BLD AUTO: 0.9 X10E3/UL (ref 0.1–0.9)
MONOCYTES NFR BLD AUTO: 13 %
NEUTROPHILS # BLD AUTO: 4.5 X10E3/UL (ref 1.4–7)
NEUTROPHILS NFR BLD AUTO: 58 %
PLATELET # BLD AUTO: 396 X10E3/UL (ref 150–450)
POTASSIUM SERPL-SCNC: 4.1 MMOL/L (ref 3.5–5.2)
PROT SERPL-MCNC: 6.6 G/DL (ref 6–8.5)
RBC # BLD AUTO: 5.27 X10E6/UL (ref 3.77–5.28)
SODIUM SERPL-SCNC: 140 MMOL/L (ref 134–144)
URATE SERPL-MCNC: 3.6 MG/DL (ref 3–7.2)
WBC # BLD AUTO: 7.5 X10E3/UL (ref 3.4–10.8)

## 2022-10-27 ENCOUNTER — OFFICE VISIT (OUTPATIENT)
Dept: RHEUMATOLOGY | Age: 62
End: 2022-10-27
Payer: COMMERCIAL

## 2022-10-27 VITALS
RESPIRATION RATE: 16 BRPM | OXYGEN SATURATION: 95 % | HEART RATE: 81 BPM | WEIGHT: 163 LBS | SYSTOLIC BLOOD PRESSURE: 128 MMHG | DIASTOLIC BLOOD PRESSURE: 77 MMHG | BODY MASS INDEX: 31.83 KG/M2 | TEMPERATURE: 98.2 F

## 2022-10-27 DIAGNOSIS — I73.00 RAYNAUD'S PHENOMENON WITHOUT GANGRENE: ICD-10-CM

## 2022-10-27 DIAGNOSIS — M34.9 LIMITED SYSTEMIC SCLEROSIS (HCC): Primary | ICD-10-CM

## 2022-10-27 PROCEDURE — 99214 OFFICE O/P EST MOD 30 MIN: CPT | Performed by: INTERNAL MEDICINE

## 2022-10-27 RX ORDER — LANOLIN ALCOHOL/MO/W.PET/CERES
500 CREAM (GRAM) TOPICAL DAILY
COMMUNITY

## 2022-10-27 NOTE — PROGRESS NOTES
REASON FOR VISIT    This is a follow-up visit for Ms. Woods for     ICD-10-CM   1. Limited systemic sclerosis (HCC)  M34.9     Therapy History includes:  Current DMARD therapy include: None  Prior DMARD therapy include: None  Discontinued DMARDs because of inefficacy: None  Discontinued DMARDs because of side effects: None    INTERVAL HISTORY    Pt returns for a follow up. She feels OK today. Pt has hand pain in her knuckles when she goes to  things. She also has finger swelling. She notes that her pain is bad but not bad enough to keep her from living her life. She says that her feet feel swollen, but they are not outright painful. Pt has joint pain in her bilateral hips and shoulders when she lays down or stands up. She does not have hand pain when she just sits in one place. Pt had cervical spine surgery since [place]. She notes that she had a lot of complications after the surgery because she was allergic to the     Pt found out the she is anemic. Her Dr thinks that this was because she was taking Omeprazole everyday because of her bad acid reflux. She has stopped taking Omeprazole and her Hemoglobin has gone  back up. She is not taking any iron supplements. Pt just started on a low dose of Lisinopril. She was allergic to the last 3 blood pressure medications she tried. She is no longer taking the Nifedipine because it made her sick. She then tried two others that did not work. Pt does not have bad dry mouth or eyes today. Pt says that she has been having some swallowing problems since her surgery. She could not eat regular food for weeks after her surgery. She says that she is able to eat normally now, but she gets a choking feeling when eating every now and again. Pt cheeks get very red sometimes. Pt was scheduled for an echocardiogram on Monday but she did not do it because she just started a new job. She recalls that her last two echocardiograms cost $13,000.      Pt is not on weight watchers trying to lose some weight. REVIEW OF SYSTEMS    A comprehensive review of systems was performed and pertinent results are documented in the HPI, review of systems is otherwise non-contributory. PAST MEDICAL HISTORY    She has a past medical history of Anxiety, Arthritis, Asthma (2008), Autoimmune disease ([address] Cancer ([facility] [address] Foot fracture, Gall stones, Hypothyroidism, Interstitial cystitis, Interstitial cystitis, Kidney stones, Menopausal disorder, Other ill-defined conditions(799.89), PONV (postoperative nausea and vomiting), Psychiatric disorder (11/10), and Thyroid disease. FAMILY HISTORY    Her family history includes Cancer in her mother; Heart Disease in her brother and father. SOCIAL HISTORY    She reports that she quit smoking about 32 years ago. Her smoking use included cigarettes. She has a 12.00 pack-year smoking history. She has never used smokeless tobacco. She reports that she does not currently use alcohol. She reports that she does not use drugs. MEDICATIONS     Current Outpatient Medications   Medication Sig    famotidine (PEPCID) 20 mg tablet Take 1 Tablet by mouth two (2) times a day. cetirizine (ZYRTEC) 10 mg tablet Take 10 mg by mouth nightly. fexofenadine (ALLEGRA) 180 mg tablet Take 180 mg by mouth.    lisinopriL (PRINIVIL, ZESTRIL) 2.5 mg tablet Take 1 Tablet by mouth in the morning. (Patient not taking: Reported on 9/12/2022)    thyroid, Pork, (ARMOUR) 90 mg tablet Take 90 mg by mouth daily. albuterol (PROVENTIL HFA, VENTOLIN HFA, PROAIR HFA) 90 mcg/actuation inhaler Take 1 Puff by inhalation every six (6) hours as needed for Wheezing. estradiol-norethindrone (COMBIPATCH) 0.05-0.25 mg/24 hr 1 Patch by TransDERmal route two (2) times a week. Pt will leave patch off Monday nite before surgery  And replace after surgery     No current facility-administered medications for this visit.      ALLERGIES:     Allergies   Allergen Reactions    Adhesive Other (comments)     Eat skin off    Codeine Anaphylaxis    Codeine Sulfate Anaphylaxis    Darvon [Propoxyphene] Itching    Percocet [Oxycodone-Acetaminophen] Itching    Sulfa (Sulfonamide Antibiotics) Hives    Vicodin [Hydrocodone-Acetaminophen] Itching    Avocado Swelling    Demerol [Meperidine] Itching    Dilaudid [Hydromorphone] Itching    Lexapro [Escitalopram] Other (comments)     shake    Sulfamethoxazole-Trimethoprim Rash       PHYSICAL EXAMINATION    Visit Vitals  /77 (BP 1 Location: Left upper arm, BP Patient Position: Sitting, BP Cuff Size: Adult)   Pulse 81   Temp 98.2 °F (36.8 °C) (Oral)   Resp 16   Wt 163 lb (73.9 kg)   SpO2 95%   BMI 31.83 kg/m²     General:  The patient is well developed, well nourished, alert, and in no apparent distress. Eyes: Sclera are anicteric. No conjunctival injection. HEENT:  Oropharynx is clear. No oral ulcers. Adequate salivary pooling. No cervical or supraclavicular lymphadenopathy. Lungs:  Clear to auscultation bilaterally, without wheeze or stridor. Normal respiratory effort. Cor:  Regular rate and rhythm. No murmur rub or gallop. Abdomen: Soft, non-tender, without hepatomegaly or masses. Extremities: No calf tenderness or edema. Warm and well perfused. Skin:  Grossly abnormal nailfold capillaries. Puffy fingers. No william sclerodactyly. Neuro: Nonfocal, no foot or wrist drop. Musculoskeletal:    A comprehensive musculoskeletal exam was performed for all joints of each upper and lower extremity and assessed for swelling, tenderness and range of motion. Results are documented as below:  No tendon friction rubs. Heberden nodes right hand  No evidence of synovitis in the small joints of the hands, wrists, shoulders, elbows, hips, knees or ankles. DATA REVIEW     Laboratory     Recent laboratory results were reviewed, summarized, and discussed with the patient.   10/14/22: , Uric acid 3.6, ESR 6, Cr 0.72, LFT WNL, CBC WNL, CBC <1 mg/L  8/22/22: Cr 0.63, WBC 8.2, HGB 12, Plt 457  7/28/22: WBC 12.6, HGB 11.1, Plt 515, Ferritin 7, Iron 24, TIBC 462, Iron % saturation 5  2/10/22: Cr 0.70, LFTs WNL, HDL 59, ; TSH 1.8    Scleroderma Studies    Pulmonary Functions Testing    PFT 4/02/2021: FVC of 2.94 (112%), FEV1 of 1.75 (91%), FEV1/FVC of 60% and a DLCO of 19.9 (98%), TLC 5.52 (135%), VC 2.94 (112%). Echocardiogram    Echocardiogram 4/02/2021: LV: Estimated LVEF is 55 - 60%. Normal cavity size, wall thickness, systolic function (ejection fraction normal) and diastolic function. Pulmonary arterial systolic pressure (PASP) is 33 mmHg. Pulmonary hypertension not suggested by Doppler findings    Imaging    Musculoskeletal Ultrasound    None    Radiographs    Chest 3/22/2021: Cardiomediastinal silhouette is normal. Pulmonary vasculature is engorged. No focal parenchymal opacities, effusions, or pneumothorax. Bony thorax is intact. CT Imaging    CT Chest without contrast 9/01/2021: CHEST WALL: No mass or axillary lymphadenopathy. THYROID: No nodule. MEDIASTINUM: No mass or lymphadenopathy. EDISON: No mass or lymphadenopathy. THORACIC AORTA: No aneurysm. MAIN PULMONARY ARTERY: Normal in caliber. TRACHEA/BRONCHI: Patent. ESOPHAGUS: No wall thickening or dilatation. HEART: Normal in size. PLEURA: No effusion or pneumothorax. LUNGS: No nodule, minimal linear scarring at the lung bases. INCIDENTALLY IMAGED UPPER ABDOMEN: No significant abnormality in the incidentally imaged upper abdomen. BONES: No destructive bone lesion. MR Imaging  4/15/22 MR Cspine:  IMPRESSION  1. Moderate spinal canal stenosis and severe bilateral neural foraminal stenosis at C5-C6. 2. Mild spinal canal stenosis and severe right neural foraminal stenosis at  C6-C7. 3. Remaining degenerative findings as detailed above. 10/25/21 MR brain w/wo, MRV brain:  No significant intracranial abnormality.  Incidental prominent osteoma along the left anterior frontal convexity arising from the inner table of the left frontal bone measuring 2.5 x 1.0 x 1.4 cm. DXA     None    GI Studies    EGD 6/29/2021: Normal duodenum. Irregular Z-line in the gastroesophageal junction. Normal mucosa in the middle third of the esophagus. Granularity in the stomach body. Streaky erythema in the antrum compatible with GAVE. PATHOLOGY    Biopsy, antrum 6/29/2021: Reactive gastropathy. No evidence of gastritis. Helicobacter stain is negative. Biopsy, stomach body 6/29/2021: Unremarkable oxyntic mucosa. No evidence of gastritis or gastropathy. The Helicobacter stain is negative. Biopsy, esophagus, middle third 6/29/2021: Unremarkable squamous mucosa. No evidence of intestinal metaplasia, reflux or eosinophilia. 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 Raynaud's is stable, no progressive sclerodactyly, and no e/o pulmonary hypertension to date. Continuing to monitor off of immunosuppression, reviewed cold avoidance measures and holding CCB's after intolerance of multiple agents for Raynaud's. 1. Limited systemic sclerosis ([facility] [address]  - Serial clinical monitoring off of immunosuppression  - C REACTIVE PROTEIN, QT; Future  - CBC WITH AUTOMATED DIFF; Future  - METABOLIC PANEL, COMPREHENSIVE; Future  - SED RATE (ESR); Future  - LD; Future    2. Raynaud's phenomenon without gangrene  - Cont cold avoidance, layering, Hot Hands  - Intolerant of calcium channel blockers, will hold PDE5 inhibitors barring e/o digital ulcers or pits. - C REACTIVE PROTEIN, QT; Future  - CBC WITH AUTOMATED DIFF; Future  - METABOLIC PANEL, COMPREHENSIVE; Future  - SED RATE (ESR); Future  - LD; Future      Patient Instructions   1) Follow up with your echocardiogram. Search around to find somewhere that is more affordable.      2) You can take 650mg of Tylenol up to 3 times a day for joint pain. 3) You can use Voltaren gel on your hands as needed for joint pain. 4) Check labs in 6 months before your follow up.     5) Follow up in 6 months. Let me know if you have any questions or concerns in the meantime. TODAY'S ORDERS    Orders Placed This Encounter    C REACTIVE PROTEIN, QT    CBC WITH AUTOMATED DIFF    METABOLIC PANEL, COMPREHENSIVE    SED RATE (ESR)    LD    cyanocobalamin (Vitamin B-12) 500 mcg tablet       Future Appointments   Date Time Provider Dayne Мария   12/15/2022 10:15 AM Sushila Khanna MD [facility] BS AMB   4/27/2023  3:00 PM Edwin Dupont MD [facility] BS AMB     Face to face time: 17 minutes  Note preparation and records review day of service: 20 minutes  Total provider time day of service: 37 minutes    This was scribed by Claudeen Darner in the presence of Dr. Yolanda Spurling. The note was reviewed and amended personally, and I agree with the above information.     Shanika Bynum MD    Adult Rheumatology   [facility]  A Part of [facility], [address] [place] [address]   Phone [phone]  Fax [phone]

## 2022-10-27 NOTE — LETTER
NOTIFICATION RETURN TO WORK / SCHOOL    10/27/2022 2:39 PM    Ms. Boogie Prabhakar  [facility]  [address] [id number]      To Whom It May Concern:    Boogie Prabhakar is currently under the care of [address] [place] [address]. She was seen in the office on 10/27/22. If there are questions or concerns please have the patient contact our office.     Sincerely,        Aditi Sanchez MD

## 2022-10-27 NOTE — PROGRESS NOTES
Chief Complaint   Patient presents with    Joint Pain     1. Have you been to the ER, urgent care clinic since your last visit? Hospitalized since your last visit? No    2. Have you seen or consulted any other health care providers outside of the [address] [place] [address] since your last visit? Include any pap smears or colon screening.  No

## 2022-10-27 NOTE — PATIENT INSTRUCTIONS
1) Follow up with your echocardiogram. Search around to find somewhere that is more affordable. 2) You can take 650mg of Tylenol up to 3 times a day for joint pain. 3) You can use Voltaren gel on your hands as needed for joint pain. 4) Check labs in 6 months before your follow up.     5) Follow up in 6 months. Let me know if you have any questions or concerns in the meantime.

## 2022-12-15 ENCOUNTER — OFFICE VISIT (OUTPATIENT)
Dept: INTERNAL MEDICINE CLINIC | Age: 62
End: 2022-12-15
Payer: COMMERCIAL

## 2022-12-15 VITALS
TEMPERATURE: 98.5 F | SYSTOLIC BLOOD PRESSURE: 112 MMHG | BODY MASS INDEX: 31.45 KG/M2 | OXYGEN SATURATION: 98 % | HEIGHT: 60 IN | DIASTOLIC BLOOD PRESSURE: 75 MMHG | WEIGHT: 160.2 LBS | HEART RATE: 93 BPM | RESPIRATION RATE: 18 BRPM

## 2022-12-15 DIAGNOSIS — E78.00 HIGH CHOLESTEROL: ICD-10-CM

## 2022-12-15 DIAGNOSIS — E03.9 ACQUIRED HYPOTHYROIDISM: ICD-10-CM

## 2022-12-15 DIAGNOSIS — I73.00 RAYNAUD'S DISEASE WITHOUT GANGRENE: ICD-10-CM

## 2022-12-15 DIAGNOSIS — M47.22 CERVICAL RADICULOPATHY DUE TO DEGENERATIVE JOINT DISEASE OF SPINE: ICD-10-CM

## 2022-12-15 DIAGNOSIS — I10 ESSENTIAL HYPERTENSION: ICD-10-CM

## 2022-12-15 DIAGNOSIS — I10 PRIMARY HYPERTENSION: ICD-10-CM

## 2022-12-15 DIAGNOSIS — K21.9 GASTROESOPHAGEAL REFLUX DISEASE WITHOUT ESOPHAGITIS: Primary | ICD-10-CM

## 2022-12-15 DIAGNOSIS — Z98.1 S/P CERVICAL SPINAL FUSION: ICD-10-CM

## 2022-12-15 DIAGNOSIS — E66.09 CLASS 1 OBESITY DUE TO EXCESS CALORIES WITHOUT SERIOUS COMORBIDITY WITH BODY MASS INDEX (BMI) OF 31.0 TO 31.9 IN ADULT: ICD-10-CM

## 2022-12-15 DIAGNOSIS — M34.9 SCLERODERMA (HCC): ICD-10-CM

## 2022-12-15 NOTE — PROGRESS NOTES
Ms. Yann Heck is presenting to follow up     CC:  Hypertension (3 mo follow up)       HPI:  66 yo woman with a hx of scleroderma, cervical radiculopathy , HTN presenting to  follow up     Recall   Patient had surgery for cervical disk herniation with radiculopathy  C5-6, C6-7. Dr Georgina Paulson performed  Two-level anterior cervical diskectomy and fusion with zero-profile biomechanical implant graft and synthetic bone graft C5-6, C6-7 on July 5th   She then had allergic reaction at surgery site and diagnosed with severe contact dermatitis . She took steroids, benadryl and took two rounds of antibiotics . Finally resolved   Doing well  Has occasional pain   Sleep is fragmented and attributes to shoulder discomfort      HTN: blood pressure is well controlled on lisinopril 2.5mg . Today blood pressure is excellent. 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 Since patient has Raynauds choosing an agent that treats that as well. She did not tolerate amlodipine, nifedipine caused swelling and   Losartan - had nausea and felt that would vomit  BP has been 140 range   She is working on weight loss  I prescribed lisinopril but she has been scare to start it     Recall   Positive SALVADOR and centromere B Ab/positive Raynaud's-noted some skin changes. Patient saw Dr. Hilario Alcantar who left practice and now sees Dr Haroldine Shone   Normal echocardiogram without evidence of pulmonary HTN in 2021 and reordered for 2022    She has an EGD on 6/22/2021 which showed GAVE. PFT on 4/02/2021 did not show restrictive disease. And PFTs ordered this year     Her PFTs showed   1. Very mild airflow obstruction. 2.  No restrictive lung disease. 3.  Air trapping is present. 4.  Normal DLCO. 5.  Normal flow-volume loop.     CT chest 09/01   Minimal linear scarring at the lung bases. Smoked for very short times in her 20s  less then 10 pack years  Normal stress test in 2018     She states she cannot afford the repeat TTE ordered       Up to date mammogram at [place] [facility] and pap smear  Current weight is 170lbs    Hx of hypothyroidism : on armour thyroid and doing well.  Recent TSH in range  No hair loss, or constipation or brittle nails  Loosing weight intentional       Sees Dr Germania Esteves and weaned off of hormone ( estradiol norethindrone )   Review of systems:  Constitutional: negative for fever, chills, weight loss, night sweats   10 systems reviewed and negative other then HPI      Past Medical History:   Diagnosis Date    Anxiety     Arthritis     neck     Asthma 2008    allergy induced in [place]    Autoimmune disease ([address]     limited systemic sclerosis     Cancer ([address]     basal cell 20 years ago upper abdomen    Foot fracture     Gall stones     Hypothyroidism     Interstitial cystitis     Dr. aZfar Fontanez at [facility]    Interstitial cystitis     Kidney stones     Menopausal disorder     Other ill-defined conditions(799.89)     kidney stones in [place]    PONV (postoperative nausea and vomiting)     severe nausea 1x in the past    Psychiatric disorder 11/10    anxiety attack    Thyroid disease         Past Surgical History:   Procedure Laterality Date    HX ADENOIDECTOMY  1977    HX CERVICAL FUSION      HX CHOLECYSTECTOMY      HX DILATION AND CURETTAGE      miscarriage x1    HX ENDOSCOPY      HX GI  2010    cholecystectomy    HX GYN      laparoscopy for fertility    HX OTHER SURGICAL  2006    hemithyroidectomy right, gall bladder removal    HX TONSILLECTOMY  1977    OR BREAST SURGERY PROCEDURE UNLISTED      left breast lumpectomy       Allergies   Allergen Reactions    Adhesive Other (comments)     Eat skin off    Codeine Anaphylaxis    Codeine Sulfate Anaphylaxis    Darvon [Propoxyphene] Itching    Percocet [Oxycodone-Acetaminophen] Itching    Sulfa (Sulfonamide Antibiotics) Hives    Vicodin [Hydrocodone-Acetaminophen] Itching    Avocado Swelling    Demerol [Meperidine] Itching    Dilaudid [Hydromorphone] Itching    Lexapro [Escitalopram] Other (comments)     shake    Sulfamethoxazole-Trimethoprim Rash       Current Outpatient Medications on File Prior to Visit   Medication Sig Dispense Refill    cyanocobalamin (VITAMIN B12) 500 mcg tablet Take 500 mcg by mouth daily. famotidine (PEPCID) 20 mg tablet Take 1 Tablet by mouth two (2) times a day. 60 Tablet 2    fexofenadine (ALLEGRA) 180 mg tablet Take 180 mg by mouth.      lisinopriL (PRINIVIL, ZESTRIL) 2.5 mg tablet Take 1 Tablet by mouth in the morning. 30 Tablet 1    thyroid, Pork, (ARMOUR) 90 mg tablet Take 90 mg by mouth daily. albuterol (PROVENTIL HFA, VENTOLIN HFA, PROAIR HFA) 90 mcg/actuation inhaler Take 1 Puff by inhalation every six (6) hours as needed for Wheezing. 1 Inhaler 2    estradiol-norethindrone (COMBIPATCH) 0.05-0.25 mg/24 hr 1 Patch by TransDERmal route two (2) times a week. Pt will leave patch off Monday nite before surgery  And replace after surgery      cetirizine (ZYRTEC) 10 mg tablet Take 10 mg by mouth nightly. (Patient not taking: No sig reported)       No current facility-administered medications on file prior to visit. family history includes Cancer in her mother; Heart Disease in her brother and father.     Social History     Socioeconomic History    Marital status:      Spouse name: Not on file    Number of children: Not on file    Years of education: Not on file    Highest education level: Not on file   Occupational History    Not on file   Tobacco Use    Smoking status: Former     Packs/day: 1.00     Years: 12.00     Pack years: 12.00     Types: Cigarettes     Quit date: 1990     Years since quittin.8    Smokeless tobacco: Never   Vaping Use    Vaping Use: Never used   Substance and Sexual Activity    Alcohol use: Not Currently     Comment: rarely Drug use: Never    Sexual activity: Yes   Other Topics Concern    Not on file   Social History Narrative    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      Social Determinants of Health     Financial Resource Strain: Low Risk     Difficulty of Paying Living Expenses: Not hard at all   Food Insecurity: No Food Insecurity    Worried About Running Out of Food in the Last Year: Never true    Ran Out of Food in the Last Year: Never true   Transportation Needs: Not on file   Physical Activity: Not on file   Stress: Not on file   Social Connections: Not on file   Intimate Partner Violence: Not on file   Housing Stability: Not on file       Visit Vitals  /75 (BP 1 Location: Left upper arm, BP Patient Position: Sitting, BP Cuff Size: Large adult)   Pulse 93   Temp 98.5 °F (36.9 °C) (Temporal)   Resp 18   Ht 5' (1.524 m)   Wt 160 lb 3.2 oz (72.7 kg)   LMP 12/23/2011   SpO2 98%   BMI 31.29 kg/m²     General:  Well appearing female no acute distress  HEENT:   PERRL,normal conjunctiva. E  Neck:  Supple. Surgical scar on left neck has areas of fullness below incision - mild tenderness and mild redness, no warmth    Respiratory: no respiratory distress,  no wheezing, no rhonchi, no rales. No chest wall tenderness. Cardiovascular:  RRR, normal S1S2, no murmur. Gastrointestinal: normal bowel sounds, soft, nontender, without masses. No hepatosplenomegaly. Extremities +2 pulses, no edema, normal sensation   Musculoskeletal:  Normal gait. Normal digits and nails. Neuro:  A and OX4, fluent speech, cranial nerves normal 2-12.   Psych:  Normal affect      Lab Results   Component Value Date/Time    WBC 7.5 10/14/2022 08:17 AM    HGB 13.0 10/14/2022 08:17 AM    HCT 41.9 10/14/2022 08:17 AM    PLATELET 876 60/98/6542 08:17 AM    MCV 80 10/14/2022 08:17 AM     Lab Results   Component Value Date/Time    Sodium 140 10/14/2022 08:17 AM    Potassium 4.1 10/14/2022 08:17 AM    Chloride 103 10/14/2022 08:17 AM    CO2 20 10/14/2022 08:17 AM    Anion gap 7 08/22/2022 09:54 AM    Glucose 88 10/14/2022 08:17 AM    BUN 14 10/14/2022 08:17 AM    Creatinine 0.72 10/14/2022 08:17 AM    BUN/Creatinine ratio 19 10/14/2022 08:17 AM    GFR est AA >60 08/22/2022 09:54 AM    GFR est non-AA >60 08/22/2022 09:54 AM    Calcium 9.5 10/14/2022 08:17 AM     Lab Results   Component Value Date/Time    Cholesterol, total 196 02/10/2022 08:36 AM    HDL Cholesterol 59 02/10/2022 08:36 AM    LDL, calculated 123 (H) 02/10/2022 08:36 AM    LDL, calculated 113 (H) 12/31/2019 08:55 AM    VLDL, calculated 14 02/10/2022 08:36 AM    VLDL, calculated 11 12/31/2019 08:55 AM    Triglyceride 75 02/10/2022 08:36 AM     Lab Results   Component Value Date/Time    TSH 1.800 02/10/2022 08:36 AM     Lab Results   Component Value Date/Time    Hemoglobin A1c 5.2 06/23/2022 04:09 PM     Lab Results   Component Value Date/Time    VITAMIN D, 25-HYDROXY 46.0 12/31/2019 08:55 AM                   Assessment and Plan:     S/P cervical spinal fusion      Iron deficiency anemia resolved after stopping PPI     Raynaud's disease without gangrene  Nifedipine caused swelling, did not tolerate amlodipine  Keeping fingers warm      Primary hypertension  Nifedipine caused swelling, did not tolerate amlodipine, losartan made her feel sick  Doing well on lisinopril 2.5mg daily     Scleroderma (HCC)  limited cutaneous systemic sclerosis with high-titer centromere ab's, puffy fingers, abnormal nailfold capillaries, telangiectasias, Raynaud's, and GERD with GAVE  Sees Dr Bailey Falcon      GERD: take pepcid instead of PPI ( stopped PPI) PPI caused anemia     Acquired hypothyroidism  Euthyroid on  thyroid pork  - TSH 3RD GENERATION; Future  - T4, FREE; Future     Class 1 obesity due to excess calories without serious comorbidity with body mass index (BMI) of 31.0 to 31.9 in adult  Loosing weight with weight watchers  - LIPID PANEL; Future  - HEMOGLOBIN A1C WITH EAG;  Future    Cindy Boyle MD

## 2022-12-15 NOTE — PROGRESS NOTES
Chief Complaint   Patient presents with    Hypertension     3 mo follow up        Visit Vitals  /75 (BP 1 Location: Left upper arm, BP Patient Position: Sitting, BP Cuff Size: Large adult)   Pulse 93   Temp 98.5 °F (36.9 °C) (Temporal)   Resp 18   Ht 5' (1.524 m)   Wt 160 lb 3.2 oz (72.7 kg)   LMP 12/23/2011   SpO2 98%   BMI 31.29 kg/m²        1. Have you been to the ER, urgent care clinic since your last visit? Hospitalized since your last visit? No    2. Have you seen or consulted any other health care providers outside of the [address] [place] [address] since your last visit? Include any pap smears or colon screening. No     Health Maintenance Due   Topic Date Due    Shingrix Vaccine Age 49> (1 of 2) Never done    COVID-19 Vaccine (3 - Booster for Moderna series) 11/29/2021    Flu Vaccine (1) 08/01/2022        3 most recent PHQ Screens 12/15/2022   Little interest or pleasure in doing things Not at all   Feeling down, depressed, irritable, or hopeless Not at all   Total Score PHQ 2 0   Trouble falling or staying asleep, or sleeping too much -   Feeling tired or having little energy -   Poor appetite, weight loss, or overeating -   Feeling bad about yourself - or that you are a failure or have let yourself or your family down -   Trouble concentrating on things such as school, work, reading, or watching TV -   Moving or speaking so slowly that other people could have noticed; or the opposite being so fidgety that others notice -   Thoughts of being better off dead, or hurting yourself in some way -   PHQ 9 Score -   How difficult have these problems made it for you to do your work, take care of your home and get along with others -        Fall Risk Assessment, last 12 mths 7/28/2022   Able to walk? Yes   Fall in past 12 months? 0   Do you feel unsteady?  0   Are you worried about falling 0       Learning Assessment 10/27/2022   PRIMARY LEARNER Patient   HIGHEST LEVEL OF EDUCATION - PRIMARY LEARNER  - BARRIERS PRIMARY LEARNER -   CO-LEARNER CAREGIVER -   PRIMARY LANGUAGE ENGLISH   LEARNER PREFERENCE PRIMARY DEMONSTRATION     -   ANSWERED BY patient   RELATIONSHIP SELF

## 2022-12-16 LAB
ANION GAP SERPL CALC-SCNC: 6 MMOL/L (ref 5–15)
BUN SERPL-MCNC: 14 MG/DL (ref 6–20)
BUN/CREAT SERPL: 21 (ref 12–20)
CALCIUM SERPL-MCNC: 9.5 MG/DL (ref 8.5–10.1)
CHLORIDE SERPL-SCNC: 108 MMOL/L (ref 97–108)
CHOLEST SERPL-MCNC: 199 MG/DL
CO2 SERPL-SCNC: 26 MMOL/L (ref 21–32)
CREAT SERPL-MCNC: 0.67 MG/DL (ref 0.55–1.02)
EST. AVERAGE GLUCOSE BLD GHB EST-MCNC: 105 MG/DL
GLUCOSE SERPL-MCNC: 93 MG/DL (ref 65–100)
HBA1C MFR BLD: 5.3 % (ref 4–5.6)
HDLC SERPL-MCNC: 65 MG/DL
HDLC SERPL: 3.1 {RATIO} (ref 0–5)
LDLC SERPL CALC-MCNC: 113 MG/DL (ref 0–100)
POTASSIUM SERPL-SCNC: 4.2 MMOL/L (ref 3.5–5.1)
SODIUM SERPL-SCNC: 140 MMOL/L (ref 136–145)
T4 FREE SERPL-MCNC: 1 NG/DL (ref 0.8–1.5)
TRIGL SERPL-MCNC: 105 MG/DL (ref ?–150)
TSH SERPL DL<=0.05 MIU/L-ACNC: 0.73 UIU/ML (ref 0.36–3.74)
VLDLC SERPL CALC-MCNC: 21 MG/DL

## 2022-12-16 RX ORDER — LISINOPRIL 2.5 MG/1
2.5 TABLET ORAL DAILY
Qty: 30 TABLET | Refills: 1 | Status: SHIPPED | OUTPATIENT
Start: 2022-12-16

## 2022-12-16 NOTE — TELEPHONE ENCOUNTER
Future Appointments:  Future Appointments   Date Time Provider Dayne Hsieh   4/27/2023  3:00 PM Niki Vela MD [facility] BS AMB   6/15/2023  8:45 AM Sushila Carlisle MD [facility] BS AMB        Last Appointment With Me:  12/15/2022     Requested Prescriptions     Pending Prescriptions Disp Refills    lisinopriL (PRINIVIL, ZESTRIL) 2.5 mg tablet 30 Tablet 1     Sig: Take 1 Tablet by mouth daily.

## 2023-01-06 RX ORDER — LEVOTHYROXINE AND LIOTHYRONINE 57; 13.5 UG/1; UG/1
90 TABLET ORAL DAILY
Qty: 90 TABLET | Refills: 1 | Status: SHIPPED | OUTPATIENT
Start: 2023-01-06

## 2023-01-06 NOTE — TELEPHONE ENCOUNTER
Future Appointments:  Future Appointments   Date Time Provider Dayne Hsieh   4/27/2023  3:00 PM Jamel Espinoza MD [facility] BS AMB   6/15/2023  8:45 AM Sushila Wyatt MD [facility] BS AMB        Last Appointment With Me:  12/15/2022     Requested Prescriptions     Pending Prescriptions Disp Refills    thyroid, Pork, (ARMOUR) 90 mg tablet 90 Tablet 1     Sig: Take 1 Tablet by mouth daily.

## 2023-01-06 NOTE — TELEPHONE ENCOUNTER
----- Message from Porsha Woods sent at 1/5/2023  7:08 PM EST -----  Regarding: Lydia Lopez : When I was there last month we discussed needing a refill for my thyroid meds. I tried requesting it on here but said it was locked and unable to be refilled through my chart. Can you please send a refill to [facility] [address]. Thank you!

## 2023-02-01 ENCOUNTER — PATIENT MESSAGE (OUTPATIENT)
Dept: INTERNAL MEDICINE CLINIC | Age: 63
End: 2023-02-01

## 2023-02-01 DIAGNOSIS — I73.00 RAYNAUD'S DISEASE WITHOUT GANGRENE: ICD-10-CM

## 2023-02-01 DIAGNOSIS — M34.9 SCLERODERMA (HCC): Primary | ICD-10-CM

## 2023-02-02 NOTE — TELEPHONE ENCOUNTER
Timimelvi Claribel 2/2/2023 7:47 AM EST      ----- Message -----  From: Bob Chawla  Sent: 2/1/2023 6:33 PM EST  To: Les Sarah Nurse [place]  Subject: Ruby Bradley. I've decided I do not want to see Dr Jhonatan Mohr for Scleroderma anymore. I've done a little research and I want to see Dr Britt Nascimento. He runs the [facility] in [facility]. Can you refer me there? Not really sure how that works since he's in a different state. I checked with my insurance and he's in my network so I'm allowed to see him. I'm having lots of hand and feet swelling and over all pain. Also I'm probably need a different blood pressure medication because I'm coughing alot on this Lisinapril. Thank you!

## 2023-02-08 DIAGNOSIS — I10 PRIMARY HYPERTENSION: ICD-10-CM

## 2023-02-08 RX ORDER — LISINOPRIL 2.5 MG/1
2.5 TABLET ORAL DAILY
Qty: 30 TABLET | Refills: 1 | Status: SHIPPED | OUTPATIENT
Start: 2023-02-08

## 2023-02-08 NOTE — TELEPHONE ENCOUNTER
Future Appointments:  Future Appointments   Date Time Provider Dayne Hsieh   4/27/2023  3:00 PM Jose Mahoney MD [facility] BS AMB   6/15/2023  8:45 AM Sushila Madden MD [facility] BS AMB        Last Appointment With Me:  12/15/2022     Requested Prescriptions     Pending Prescriptions Disp Refills    lisinopriL (PRINIVIL, ZESTRIL) 2.5 mg tablet 30 Tablet 1     Sig: Take 1 Tablet by mouth daily.

## 2023-03-30 ENCOUNTER — VIRTUAL VISIT (OUTPATIENT)
Dept: INTERNAL MEDICINE CLINIC | Age: 63
End: 2023-03-30
Payer: COMMERCIAL

## 2023-03-30 DIAGNOSIS — Z88.9 H/O SEASONAL ALLERGIES: ICD-10-CM

## 2023-03-30 DIAGNOSIS — I10 PRIMARY HYPERTENSION: Primary | ICD-10-CM

## 2023-03-30 DIAGNOSIS — M34.9 SCLERODERMA (HCC): ICD-10-CM

## 2023-03-30 DIAGNOSIS — R05.8 ACE-INHIBITOR COUGH: ICD-10-CM

## 2023-03-30 DIAGNOSIS — T46.4X5A ACE-INHIBITOR COUGH: ICD-10-CM

## 2023-03-30 PROCEDURE — 99214 OFFICE O/P EST MOD 30 MIN: CPT | Performed by: INTERNAL MEDICINE

## 2023-03-30 RX ORDER — NEBIVOLOL 2.5 MG/1
2.5 TABLET ORAL DAILY
Qty: 30 TABLET | Refills: 1 | Status: SHIPPED | OUTPATIENT
Start: 2023-03-30

## 2023-03-30 RX ORDER — LEVOTHYROXINE AND LIOTHYRONINE 57; 13.5 UG/1; UG/1
90 TABLET ORAL DAILY
Qty: 90 TABLET | Refills: 1 | Status: SHIPPED | OUTPATIENT
Start: 2023-03-30

## 2023-03-30 RX ORDER — MONTELUKAST SODIUM 10 MG/1
10 TABLET ORAL DAILY
Qty: 90 TABLET | Refills: 1 | Status: SHIPPED | OUTPATIENT
Start: 2023-03-30

## 2023-03-30 RX ORDER — NEBIVOLOL 2.5 MG/1
2.5 TABLET ORAL DAILY
Qty: 30 TABLET | Refills: 1 | Status: SHIPPED | OUTPATIENT
Start: 2023-03-30 | End: 2023-03-30 | Stop reason: SDUPTHER

## 2023-03-30 NOTE — PROGRESS NOTES
Called, no answer left message that I will give her a call closer to The appointment time. I called patient back at 1:28 to get her checked in for her VV, patients reports he has already checked in on line for her VV. Link sent via text to [phone] .         Frank Anne LPN

## 2023-03-30 NOTE — PROGRESS NOTES
CC: Medication Evaluation    HTN  HPI:    She is a 65 y.o. female who presents for evaluation of HTN management         She did not tolerate amlodipine, losartan had swelling with amlodipine and fatigue with losartan  Lisinopril 2.5mg initially did very well but now having cough spells   Has weird sensation in throat      She ha a hx of scleroderma  She saw [facility]  I reviewed note  Will hold off on immunosuppressive   Will do PFTs and ECHO in 6 months    For shoulder pain recommended PT     Having neck pain - better then prior to surgery (  TWO LEVEL ANTERIOR CERVICAL DISCECTOMY AND FUSION C5,6, C6,7 WITH ZERO PROFILE GRAFT AND ALLOGRAFT )      This is an established visit conducted via telemedicine with video. The patient has been instructed that this meets HIPAA criteria and acknowledges and agrees to this method of visitation. Pursuant to the emergency declaration under the [facility], [facility]5 waiver authority and the [employer] Act, this Virtual Visit was conducted, with patient's consent, to reduce the patient's risk of exposure to COVID-19 and provide continuity of care for an established patient. Services were provided through a video synchronous discussion virtually to substitute for in-person clinic visit.        ROS:  Constitutional: negative for fevers, chills, anorexia and weight loss  10 systems reviewed and negative other then HPI    Past Medical History:   Diagnosis Date    Anxiety     Arthritis     neck     Asthma 2008    allergy induced in [place]    Autoimmune disease ([facility]     limited systemic sclerosis     Cancer ([facility]     basal cell 20 years ago upper abdomen    Foot fracture     Gall stones     Hypothyroidism     Interstitial cystitis     Dr. Amparo Hendricks at [facility]    Interstitial cystitis     Kidney stones     Menopausal disorder     Other ill-defined conditions(249.89)     kidney stones in [place]    PONV (postoperative nausea and vomiting)     severe nausea 1x in the past    Psychiatric disorder 11/10    anxiety attack    Thyroid disease        Current Outpatient Medications on File Prior to Visit   Medication Sig Dispense Refill    lisinopriL (PRINIVIL, ZESTRIL) 2.5 mg tablet Take 1 Tablet by mouth daily. 30 Tablet 1    thyroid, Pork, (ARMOUR) 90 mg tablet Take 1 Tablet by mouth daily. 90 Tablet 1    cyanocobalamin (VITAMIN B12) 500 mcg tablet Take 500 mcg by mouth daily. famotidine (PEPCID) 20 mg tablet Take 1 Tablet by mouth two (2) times a day. 60 Tablet 2    cetirizine (ZYRTEC) 10 mg tablet Take 10 mg by mouth nightly. (Patient not taking: No sig reported)      fexofenadine (ALLEGRA) 180 mg tablet Take 180 mg by mouth. albuterol (PROVENTIL HFA, VENTOLIN HFA, PROAIR HFA) 90 mcg/actuation inhaler Take 1 Puff by inhalation every six (6) hours as needed for Wheezing. 1 Inhaler 2     No current facility-administered medications on file prior to visit.        Past Surgical History:   Procedure Laterality Date    HX ADENOIDECTOMY  1977    HX CERVICAL FUSION      HX CHOLECYSTECTOMY      HX DILATION AND CURETTAGE      miscarriage x1    HX ENDOSCOPY      HX GI  2010    cholecystectomy    HX GYN      laparoscopy for fertility    HX OTHER SURGICAL  2006    hemithyroidectomy right, gall bladder removal    HX TONSILLECTOMY  1977    VT BREAST SURGERY PROCEDURE UNLISTED      left breast lumpectomy       Family History   Problem Relation Age of Onset    Cancer Mother     Heart Disease Father     Heart Disease Brother      Reviewed and no changes     Social History     Socioeconomic History    Marital status:      Spouse name: Not on file    Number of children: Not on file    Years of education: Not on file    Highest education level: Not on file   Occupational History    Not on file   Tobacco Use    Smoking status: Former     Packs/day: 1.00     Years: 12.00     Pack years: 12.00     Types: Cigarettes     Quit date: 1990     Years since quittin.1    Smokeless tobacco: Never   Vaping Use    Vaping Use: Never used   Substance and Sexual Activity    Alcohol use: Not Currently     Comment: rarely    Drug use: Never    Sexual activity: Yes   Other Topics Concern    Not on file   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      Social Determinants of Health     Financial Resource Strain: Low Risk     Difficulty of Paying Living Expenses: Not hard at all   Food Insecurity: No Food Insecurity    Worried About Running Out of Food in the Last Year: Never true    Ran Out of Food in the Last Year: Never true   Transportation Needs: Not on file   Physical Activity: Not on file   Stress: Not on file   Social Connections: Not on file   Intimate Partner Violence: Not on file   Housing Stability: Not on file          Visit Vitals  [facility] 2011       Physical Examination:   Gen: well appearing female  HEENT: normal conjunctiva, no audible congestion, patient does not see oral erythema, has MMM  Neck: patient does not feel enlarged or tender LAD or masses  Resp: normal respiratory effort, no audible wheezing. CV: patient does not feel palpitations or heart irregularity  Abd: patient does not feel abdominal tenderness or mass, patient does not notice distension  Extrem: patient does not see swelling in ankles or joints.    Neuro: Alert and oriented, able to answer questions without difficulty, able to move all extremities and walk normally          Lab Results   Component Value Date/Time    WBC 7.4 2023 04:13 PM    HGB 13.0 2023 04:13 PM    HCT 37.6 2023 04:13 PM    PLATELET 578  04:13 PM    MCV 89 2023 04:13 PM     Lab Results   Component Value Date/Time    Sodium 143 2023 04:13 PM    Potassium 4.3 2023 04:13 PM    Chloride 105 2023 04:13 PM    CO2 22 2023 04:13 PM    Anion gap 6 12/15/2022 11:15 AM    Glucose 87 2023 04:13 PM BUN 15 02/23/2023 04:13 PM    Creatinine 0.76 02/23/2023 04:13 PM    BUN/Creatinine ratio 20 02/23/2023 04:13 PM    GFR est AA >60 08/22/2022 09:54 AM    GFR est non-AA >60 08/22/2022 09:54 AM    Calcium 9.3 02/23/2023 04:13 PM     Lab Results   Component Value Date/Time    Cholesterol, total 199 12/15/2022 11:15 AM    HDL Cholesterol 65 12/15/2022 11:15 AM    LDL, calculated 113 (H) 12/15/2022 11:15 AM    VLDL, calculated 21 12/15/2022 11:15 AM    Triglyceride 105 12/15/2022 11:15 AM    CHOL/HDL Ratio 3.1 12/15/2022 11:15 AM     Lab Results   Component Value Date/Time    TSH 0.73 12/15/2022 11:15 AM     No results found for: PSA, Nir Fournier, [id number], [id number]  Lab Results   Component Value Date/Time    Hemoglobin A1c 5.3 12/15/2022 11:15 AM     Lab Results   Component Value Date/Time    VITAMIN D, 25-HYDROXY 46.0 12/31/2019 08:55 AM       Lab Results   Component Value Date/Time    ALT (SGPT) 27 02/23/2023 04:13 PM    Alk. phosphatase 65 02/23/2023 04:13 PM    Bilirubin, total <0.2 02/23/2023 04:13 PM           Assessment/Plan:    1. Primary hypertension  Did not tolerate amlodipine and losartan  Has ace related cough stop   Start bystolic 1.4BM     2. ACE-inhibitor cough  Stop lisinopril     3. Scleroderma (HCC)  Positive SALVADOR and centromere B Ab/positive Raynaud's-noted some skin changes  Followed at [facility] now   Has GAVE    4. Seasonal Allergies: renewed singulair and continues with zyrtec      5. Hypothyroidism: refilled armour thyroid     Has follow up in Jaimee Sadnhu MD    This is an established visit conducted via real time video and audio telemedicine. The patient has been instructed that this meets HIPAA criteria and acknowledges and agrees to this method of visitation.

## 2023-04-04 ENCOUNTER — TRANSCRIBE ORDER (OUTPATIENT)
Dept: SCHEDULING | Age: 63
End: 2023-04-04

## 2023-04-19 ENCOUNTER — TRANSCRIBE ORDER (OUTPATIENT)
Dept: REGISTRATION | Age: 63
End: 2023-04-19

## 2023-04-19 DIAGNOSIS — M54.2 CERVICALGIA: Primary | ICD-10-CM

## 2023-04-19 DIAGNOSIS — M25.519 PAIN IN JOINT, SHOULDER REGION: ICD-10-CM

## 2023-04-23 DIAGNOSIS — M54.2 CERVICALGIA: Primary | ICD-10-CM

## 2023-05-25 RX ORDER — AZITHROMYCIN 250 MG/1
250 TABLET, FILM COATED ORAL SEE ADMIN INSTRUCTIONS
COMMUNITY
Start: 2023-04-14

## 2023-05-25 RX ORDER — NEBIVOLOL 2.5 MG/1
2.5 TABLET ORAL DAILY
COMMUNITY
Start: 2023-03-30 | End: 2023-06-23 | Stop reason: SDUPTHER

## 2023-05-25 RX ORDER — THYROID 90 MG/1
90 TABLET ORAL DAILY
COMMUNITY
Start: 2023-03-30

## 2023-05-25 RX ORDER — FEXOFENADINE HCL 180 MG/1
180 TABLET ORAL
COMMUNITY

## 2023-05-25 RX ORDER — FAMOTIDINE 20 MG/1
20 TABLET, FILM COATED ORAL 2 TIMES DAILY
COMMUNITY
Start: 2022-09-12

## 2023-05-25 RX ORDER — ALBUTEROL SULFATE 90 UG/1
1 AEROSOL, METERED RESPIRATORY (INHALATION) EVERY 6 HOURS PRN
COMMUNITY
Start: 2023-04-14

## 2023-05-25 RX ORDER — MONTELUKAST SODIUM 10 MG/1
10 TABLET ORAL DAILY
COMMUNITY
Start: 2023-03-30

## 2023-05-25 RX ORDER — CETIRIZINE HYDROCHLORIDE 10 MG/1
10 TABLET ORAL NIGHTLY
COMMUNITY

## 2023-06-23 RX ORDER — NEBIVOLOL 2.5 MG/1
2.5 TABLET ORAL DAILY
Qty: 90 TABLET | Refills: 0 | Status: SHIPPED | OUTPATIENT
Start: 2023-06-23

## 2023-06-23 NOTE — TELEPHONE ENCOUNTER
PCP: Radha Stein MD    Last appt:   3/30/2023    Future Appointments   Date Time Provider Moncho Mcelroy   8/4/2023  8:15 AM Alejandra Ramirez MD [facility] BS AMB       Requested Prescriptions     Pending Prescriptions Disp Refills    nebivolol (BYSTOLIC) 2.5 MG tablet 90 tablet 0     Sig: Take 1 tablet by mouth daily

## 2023-08-01 SDOH — ECONOMIC STABILITY: FOOD INSECURITY: WITHIN THE PAST 12 MONTHS, YOU WORRIED THAT YOUR FOOD WOULD RUN OUT BEFORE YOU GOT MONEY TO BUY MORE.: NEVER TRUE

## 2023-08-01 SDOH — ECONOMIC STABILITY: HOUSING INSECURITY
IN THE LAST 12 MONTHS, WAS THERE A TIME WHEN YOU DID NOT HAVE A STEADY PLACE TO SLEEP OR SLEPT IN A SHELTER (INCLUDING NOW)?: NO

## 2023-08-01 SDOH — ECONOMIC STABILITY: INCOME INSECURITY: HOW HARD IS IT FOR YOU TO PAY FOR THE VERY BASICS LIKE FOOD, HOUSING, MEDICAL CARE, AND HEATING?: NOT VERY HARD

## 2023-08-01 SDOH — ECONOMIC STABILITY: TRANSPORTATION INSECURITY
IN THE PAST 12 MONTHS, HAS LACK OF TRANSPORTATION KEPT YOU FROM MEETINGS, WORK, OR FROM GETTING THINGS NEEDED FOR DAILY LIVING?: NO

## 2023-08-01 SDOH — ECONOMIC STABILITY: FOOD INSECURITY: WITHIN THE PAST 12 MONTHS, THE FOOD YOU BOUGHT JUST DIDN'T LAST AND YOU DIDN'T HAVE MONEY TO GET MORE.: NEVER TRUE

## 2023-08-04 ENCOUNTER — OFFICE VISIT (OUTPATIENT)
Age: 63
End: 2023-08-04
Payer: COMMERCIAL

## 2023-08-04 VITALS
RESPIRATION RATE: 18 BRPM | HEIGHT: 60 IN | HEART RATE: 66 BPM | TEMPERATURE: 98.4 F | OXYGEN SATURATION: 98 % | DIASTOLIC BLOOD PRESSURE: 75 MMHG | WEIGHT: 169 LBS | SYSTOLIC BLOOD PRESSURE: 118 MMHG | BODY MASS INDEX: 33.18 KG/M2

## 2023-08-04 DIAGNOSIS — I73.00 RAYNAUD'S SYNDROME WITHOUT GANGRENE: ICD-10-CM

## 2023-08-04 DIAGNOSIS — M34.9 SYSTEMIC SCLEROSIS, UNSPECIFIED (HCC): ICD-10-CM

## 2023-08-04 DIAGNOSIS — I10 ESSENTIAL (PRIMARY) HYPERTENSION: ICD-10-CM

## 2023-08-04 DIAGNOSIS — E66.9 OBESITY (BMI 30-39.9): ICD-10-CM

## 2023-08-04 DIAGNOSIS — E06.3 HYPOTHYROIDISM DUE TO HASHIMOTO'S THYROIDITIS: ICD-10-CM

## 2023-08-04 DIAGNOSIS — E03.8 HYPOTHYROIDISM DUE TO HASHIMOTO'S THYROIDITIS: ICD-10-CM

## 2023-08-04 DIAGNOSIS — K21.9 GASTROESOPHAGEAL REFLUX DISEASE WITHOUT ESOPHAGITIS: Primary | ICD-10-CM

## 2023-08-04 PROCEDURE — 99214 OFFICE O/P EST MOD 30 MIN: CPT | Performed by: INTERNAL MEDICINE

## 2023-08-04 PROCEDURE — 3074F SYST BP LT 130 MM HG: CPT | Performed by: INTERNAL MEDICINE

## 2023-08-04 PROCEDURE — 3078F DIAST BP <80 MM HG: CPT | Performed by: INTERNAL MEDICINE

## 2023-08-04 RX ORDER — PHENTERMINE HYDROCHLORIDE 15 MG/1
15 CAPSULE ORAL EVERY MORNING
Qty: 30 CAPSULE | Refills: 0 | Status: SHIPPED | OUTPATIENT
Start: 2023-08-04 | End: 2023-09-03

## 2023-08-04 RX ORDER — PANTOPRAZOLE SODIUM 40 MG/1
40 TABLET, DELAYED RELEASE ORAL
Qty: 90 TABLET | Refills: 1 | Status: SHIPPED | OUTPATIENT
Start: 2023-08-04

## 2023-08-04 ASSESSMENT — PATIENT HEALTH QUESTIONNAIRE - PHQ9
SUM OF ALL RESPONSES TO PHQ QUESTIONS 1-9: 0
1. LITTLE INTEREST OR PLEASURE IN DOING THINGS: 0
2. FEELING DOWN, DEPRESSED OR HOPELESS: 0
SUM OF ALL RESPONSES TO PHQ QUESTIONS 1-9: 0
SUM OF ALL RESPONSES TO PHQ9 QUESTIONS 1 & 2: 0

## 2023-08-04 NOTE — PATIENT INSTRUCTIONS
Trial of low dose phentermine   Take pantoprazole for GERD 30 minutes before eating in the morning  Check labs in 3 weeks and blood pressure

## 2023-08-04 NOTE — PROGRESS NOTES
1. \"Have you been to the ER, urgent care clinic since your last visit? Hospitalized since your last visit? \" No    2. \"Have you seen or consulted any other health care providers outside of the [address] [place] [address] since your last visit? \" No     3. For patients aged 43-73: Has the patient had a colonoscopy / FIT/ Cologuard? Yes - Care Gap present. Most recent result on file      If the patient is female:    4. For patients aged 43-66: Has the patient had a mammogram within the past 2 years? No scheduled for 08/30/2023      5. For patients aged 21-65: Has the patient had a pap smear?  No scheduled for 08/30/2023
shake    Hydromorphone Itching    Meperidine Itching    Sulfamethoxazole-Trimethoprim Rash       Current Outpatient Medications on File Prior to Visit   Medication Sig Dispense Refill    nebivolol (BYSTOLIC) 2.5 MG tablet Take 1 tablet by mouth daily 90 tablet 0    albuterol sulfate HFA (PROVENTIL;VENTOLIN;PROAIR) 108 (90 Base) MCG/ACT inhaler Inhale 1 puff into the lungs every 6 hours as needed      cetirizine (ZYRTEC) 10 MG tablet Take 1 tablet by mouth nightly      cyanocobalamin 500 MCG tablet Take 1 tablet by mouth daily      famotidine (PEPCID) 20 MG tablet Take 1 tablet by mouth 2 times daily      fexofenadine (ALLEGRA) 180 MG tablet Take 1 tablet by mouth      thyroid (ARMOUR) 90 MG tablet Take 1 tablet by mouth daily       No current facility-administered medications on file prior to visit. family history includes Cancer in her brother, father, and mother; Heart Disease in her brother, brother, and father. Social History     Socioeconomic History    Marital status:      Spouse name: Not on file    Number of children: Not on file    Years of education: Not on file    Highest education level: Not on file   Occupational History    Not on file   Tobacco Use    Smoking status: Former     Packs/day: 1.00     Years: 10.00     Pack years: 10.00     Types: Cigarettes     Quit date: 1990     Years since quittin.4    Smokeless tobacco: Never   Substance and Sexual Activity    Alcohol use: Not Currently    Drug use: Never    Sexual activity: Not on file   Other Topics Concern    Not on file   Social History Narrative    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   Living with      Social Determinants of Health     Financial Resource Strain: Low Risk     Difficulty of Paying Living Expenses: Not very hard   Food Insecurity: No Food Insecurity    Worried About Running Out of Food in the Last Year: Never true    Ran Out of Food in the Last Year: Never true   Transportation Needs: Unknown    Lack of

## 2023-09-21 RX ORDER — NEBIVOLOL 2.5 MG/1
2.5 TABLET ORAL DAILY
Qty: 90 TABLET | Refills: 0 | Status: SHIPPED | OUTPATIENT
Start: 2023-09-21

## 2023-10-27 LAB
ALBUMIN SERPL-MCNC: 4.4 G/DL (ref 3.9–4.9)
ALBUMIN/GLOB SERPL: 2.1 {RATIO} (ref 1.2–2.2)
ALP SERPL-CCNC: 56 IU/L (ref 44–121)
ALT SERPL-CCNC: 13 IU/L (ref 0–32)
AST SERPL-CCNC: 12 IU/L (ref 0–40)
BASOPHILS # BLD AUTO: 0.1 X10E3/UL (ref 0–0.2)
BASOPHILS NFR BLD AUTO: 1 %
BILIRUB SERPL-MCNC: 0.5 MG/DL (ref 0–1.2)
BUN SERPL-MCNC: 14 MG/DL (ref 8–27)
BUN/CREAT SERPL: 19 (ref 12–28)
CALCIUM SERPL-MCNC: 9.3 MG/DL (ref 8.7–10.3)
CHLORIDE SERPL-SCNC: 103 MMOL/L (ref 96–106)
CO2 SERPL-SCNC: 20 MMOL/L (ref 20–29)
CREAT SERPL-MCNC: 0.74 MG/DL (ref 0.57–1)
EGFRCR SERPLBLD CKD-EPI 2021: 91 ML/MIN/1.73
EOSINOPHIL # BLD AUTO: 0.1 X10E3/UL (ref 0–0.4)
EOSINOPHIL NFR BLD AUTO: 1 %
ERYTHROCYTE [DISTWIDTH] IN BLOOD BY AUTOMATED COUNT: 12.6 % (ref 11.7–15.4)
GLOBULIN SER CALC-MCNC: 2.1 G/DL (ref 1.5–4.5)
GLUCOSE SERPL-MCNC: 88 MG/DL (ref 70–99)
HCT VFR BLD AUTO: 42.4 % (ref 34–46.6)
HGB BLD-MCNC: 14 G/DL (ref 11.1–15.9)
IMM GRANULOCYTES # BLD AUTO: 0 X10E3/UL (ref 0–0.1)
IMM GRANULOCYTES NFR BLD AUTO: 0 %
LYMPHOCYTES # BLD AUTO: 1.8 X10E3/UL (ref 0.7–3.1)
LYMPHOCYTES NFR BLD AUTO: 21 %
MCH RBC QN AUTO: 29.5 PG (ref 26.6–33)
MCHC RBC AUTO-ENTMCNC: 33 G/DL (ref 31.5–35.7)
MCV RBC AUTO: 89 FL (ref 79–97)
MONOCYTES # BLD AUTO: 0.8 X10E3/UL (ref 0.1–0.9)
MONOCYTES NFR BLD AUTO: 9 %
NEUTROPHILS # BLD AUTO: 6 X10E3/UL (ref 1.4–7)
NEUTROPHILS NFR BLD AUTO: 68 %
PLATELET # BLD AUTO: 282 X10E3/UL (ref 150–450)
POTASSIUM SERPL-SCNC: 3.9 MMOL/L (ref 3.5–5.2)
PROT SERPL-MCNC: 6.5 G/DL (ref 6–8.5)
RBC # BLD AUTO: 4.75 X10E6/UL (ref 3.77–5.28)
SODIUM SERPL-SCNC: 140 MMOL/L (ref 134–144)
T4 FREE SERPL-MCNC: 1.08 NG/DL (ref 0.82–1.77)
TSH SERPL DL<=0.005 MIU/L-ACNC: 1.95 UIU/ML (ref 0.45–4.5)
WBC # BLD AUTO: 8.7 X10E3/UL (ref 3.4–10.8)

## 2023-11-06 ENCOUNTER — OFFICE VISIT (OUTPATIENT)
Age: 63
End: 2023-11-06
Payer: COMMERCIAL

## 2023-11-06 VITALS
SYSTOLIC BLOOD PRESSURE: 138 MMHG | OXYGEN SATURATION: 99 % | HEART RATE: 79 BPM | DIASTOLIC BLOOD PRESSURE: 88 MMHG | WEIGHT: 169.4 LBS | RESPIRATION RATE: 18 BRPM | BODY MASS INDEX: 30.02 KG/M2 | HEIGHT: 63 IN

## 2023-11-06 DIAGNOSIS — E03.8 HYPOTHYROIDISM DUE TO HASHIMOTO'S THYROIDITIS: ICD-10-CM

## 2023-11-06 DIAGNOSIS — K21.9 GASTROESOPHAGEAL REFLUX DISEASE WITHOUT ESOPHAGITIS: ICD-10-CM

## 2023-11-06 DIAGNOSIS — K21.9 GASTRO-ESOPHAGEAL REFLUX DISEASE WITHOUT ESOPHAGITIS: ICD-10-CM

## 2023-11-06 DIAGNOSIS — I10 ESSENTIAL (PRIMARY) HYPERTENSION: ICD-10-CM

## 2023-11-06 DIAGNOSIS — Z23 NEEDS FLU SHOT: Primary | ICD-10-CM

## 2023-11-06 DIAGNOSIS — E06.3 HYPOTHYROIDISM DUE TO HASHIMOTO'S THYROIDITIS: ICD-10-CM

## 2023-11-06 DIAGNOSIS — M34.9 SYSTEMIC SCLEROSIS, UNSPECIFIED (HCC): ICD-10-CM

## 2023-11-06 PROCEDURE — 90674 CCIIV4 VAC NO PRSV 0.5 ML IM: CPT | Performed by: INTERNAL MEDICINE

## 2023-11-06 PROCEDURE — 90471 IMMUNIZATION ADMIN: CPT | Performed by: INTERNAL MEDICINE

## 2023-11-06 PROCEDURE — 99214 OFFICE O/P EST MOD 30 MIN: CPT | Performed by: INTERNAL MEDICINE

## 2023-11-06 PROCEDURE — 3075F SYST BP GE 130 - 139MM HG: CPT | Performed by: INTERNAL MEDICINE

## 2023-11-06 PROCEDURE — 3079F DIAST BP 80-89 MM HG: CPT | Performed by: INTERNAL MEDICINE

## 2023-11-06 ASSESSMENT — PATIENT HEALTH QUESTIONNAIRE - PHQ9
SUM OF ALL RESPONSES TO PHQ9 QUESTIONS 1 & 2: 0
1. LITTLE INTEREST OR PLEASURE IN DOING THINGS: 0
SUM OF ALL RESPONSES TO PHQ QUESTIONS 1-9: 0
2. FEELING DOWN, DEPRESSED OR HOPELESS: 0

## 2023-11-08 RX ORDER — THYROID,PORK 90 MG
90 TABLET ORAL DAILY
Qty: 90 TABLET | Refills: 1 | Status: SHIPPED | OUTPATIENT
Start: 2023-11-08

## 2024-02-28 DIAGNOSIS — K21.9 GASTROESOPHAGEAL REFLUX DISEASE WITHOUT ESOPHAGITIS: ICD-10-CM

## 2024-02-28 RX ORDER — PANTOPRAZOLE SODIUM 40 MG/1
40 TABLET, DELAYED RELEASE ORAL
Qty: 90 TABLET | Refills: 1 | Status: SHIPPED | OUTPATIENT
Start: 2024-02-28

## 2024-03-18 RX ORDER — NEBIVOLOL 2.5 MG/1
2.5 TABLET ORAL DAILY
Qty: 90 TABLET | Refills: 0 | Status: SHIPPED | OUTPATIENT
Start: 2024-03-18

## 2024-04-08 ENCOUNTER — TELEPHONE (OUTPATIENT)
Age: 64
End: 2024-04-08

## 2024-04-08 NOTE — TELEPHONE ENCOUNTER
Deena//Dr. Tiara Rahman off states she needs to get 2021 Lab results re-faxed as pages are missing. Thank you    Fax# is [phone]   ATTN:  Deena

## 2024-05-07 ENCOUNTER — OFFICE VISIT (OUTPATIENT)
Age: 64
End: 2024-05-07
Payer: COMMERCIAL

## 2024-05-07 VITALS
DIASTOLIC BLOOD PRESSURE: 84 MMHG | HEART RATE: 63 BPM | HEIGHT: 63 IN | OXYGEN SATURATION: 98 % | TEMPERATURE: 97.8 F | BODY MASS INDEX: 30.58 KG/M2 | RESPIRATION RATE: 18 BRPM | SYSTOLIC BLOOD PRESSURE: 138 MMHG | WEIGHT: 172.6 LBS

## 2024-05-07 DIAGNOSIS — M34.9 SYSTEMIC SCLEROSIS, UNSPECIFIED (HCC): ICD-10-CM

## 2024-05-07 DIAGNOSIS — I73.00 RAYNAUD'S SYNDROME WITHOUT GANGRENE: ICD-10-CM

## 2024-05-07 DIAGNOSIS — I10 ESSENTIAL (PRIMARY) HYPERTENSION: ICD-10-CM

## 2024-05-07 DIAGNOSIS — K21.9 GASTROESOPHAGEAL REFLUX DISEASE WITHOUT ESOPHAGITIS: ICD-10-CM

## 2024-05-07 DIAGNOSIS — E03.8 HYPOTHYROIDISM DUE TO HASHIMOTO'S THYROIDITIS: Primary | ICD-10-CM

## 2024-05-07 DIAGNOSIS — E06.3 HYPOTHYROIDISM DUE TO HASHIMOTO'S THYROIDITIS: Primary | ICD-10-CM

## 2024-05-07 PROCEDURE — 3075F SYST BP GE 130 - 139MM HG: CPT | Performed by: INTERNAL MEDICINE

## 2024-05-07 PROCEDURE — 99215 OFFICE O/P EST HI 40 MIN: CPT | Performed by: INTERNAL MEDICINE

## 2024-05-07 PROCEDURE — 3078F DIAST BP <80 MM HG: CPT | Performed by: INTERNAL MEDICINE

## 2024-05-07 ASSESSMENT — PATIENT HEALTH QUESTIONNAIRE - PHQ9
2. FEELING DOWN, DEPRESSED OR HOPELESS: NOT AT ALL
SUM OF ALL RESPONSES TO PHQ QUESTIONS 1-9: 0
1. LITTLE INTEREST OR PLEASURE IN DOING THINGS: NOT AT ALL
SUM OF ALL RESPONSES TO PHQ QUESTIONS 1-9: 0
SUM OF ALL RESPONSES TO PHQ9 QUESTIONS 1 & 2: 0

## 2024-05-07 NOTE — PATIENT INSTRUCTIONS
Tiara Rahman records requested    Schedule ECHO      Commit to exercise daily and healthy diet goal is to loose 4 lbs per month

## 2024-05-07 NOTE — PROGRESS NOTES
\"Have you been to the ER, urgent care clinic since your last visit?  Hospitalized since your last visit?\"    NO    “Have you seen or consulted any other health care providers outside of [facility] since your last visit?”    NO            Click Here for Release of Records Request    
syndrome, presenting to follow-up on chronic medical problems. 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 Dr. Madrid performed the surgery. Her neck pain has improved since. Blood pressure is controlled. She is currently on Bystolic 2.5 mg. She has a history of scleroderma, positive [place] and centromere with Raynaud's. She has some skin changes. She had a normal echogram without evidence of pulmonary hypertension. She had an endoscopy in 2021, which showed GAVE. She is currently on Protonix.   She had pulmonary function tests done in 2021 that did not show any restrictive disease and she also had a CT of the chest, which showed minimal linear scarring of the lung bases. I had ordered a repeat echocardiogram, but she has not had a chance to do that.        The patient continues to experience significant pain in her shoulders, which Dr. Tiara Rahman is typical and not expected to resolve. She is unable to lift her leg and is not engaged in formal exercise due to fatigue. Her fatigue has escalated to the point where she is sleeping all weekend. She is uncertain whether her fatigue is due to aging or autoimmune issues. She has consulted with Dr. Tiara Rahman, a rheumatologist, for her scleroderma, who did not recommend a repeat echo or pulmonary function test. Dr. Rahman performed extensive blood work on her.   She is not currently on any medication for her scleroderma and has never tried CellCept.      She has a history of hypothyroidism. She is on [place] thyroid and doing well for GYN care. She is up to date with her mammogram at [facility] and Pap smears. She sees Dr. Neves.  She underwent a mammogram in 08/2023 and a Pap smear in 11/2023, both of which yielded normal results.     She experiences severe diarrhea, which typically resolves within 10 to 30 minutes of eating. Dr. Tiara Rahman recommended a consultation with a gastroenterologist for potential

## 2024-06-17 RX ORDER — NEBIVOLOL 2.5 MG/1
2.5 TABLET ORAL DAILY
Qty: 90 TABLET | Refills: 0 | Status: SHIPPED | OUTPATIENT
Start: 2024-06-17

## 2024-06-17 RX ORDER — THYROID,PORK 90 MG
90 TABLET ORAL DAILY
Qty: 90 TABLET | Refills: 1 | Status: SHIPPED | OUTPATIENT
Start: 2024-06-17

## 2024-08-19 SDOH — ECONOMIC STABILITY: INCOME INSECURITY: HOW HARD IS IT FOR YOU TO PAY FOR THE VERY BASICS LIKE FOOD, HOUSING, MEDICAL CARE, AND HEATING?: NOT VERY HARD

## 2024-08-19 SDOH — ECONOMIC STABILITY: FOOD INSECURITY: WITHIN THE PAST 12 MONTHS, YOU WORRIED THAT YOUR FOOD WOULD RUN OUT BEFORE YOU GOT MONEY TO BUY MORE.: NEVER TRUE

## 2024-08-19 SDOH — ECONOMIC STABILITY: FOOD INSECURITY: WITHIN THE PAST 12 MONTHS, THE FOOD YOU BOUGHT JUST DIDN'T LAST AND YOU DIDN'T HAVE MONEY TO GET MORE.: NEVER TRUE

## 2024-08-22 ENCOUNTER — OFFICE VISIT (OUTPATIENT)
Age: 64
End: 2024-08-22
Payer: COMMERCIAL

## 2024-08-22 VITALS
DIASTOLIC BLOOD PRESSURE: 87 MMHG | TEMPERATURE: 97.9 F | WEIGHT: 170.8 LBS | SYSTOLIC BLOOD PRESSURE: 150 MMHG | BODY MASS INDEX: 30.26 KG/M2 | RESPIRATION RATE: 18 BRPM | OXYGEN SATURATION: 100 % | HEIGHT: 63 IN | HEART RATE: 66 BPM

## 2024-08-22 DIAGNOSIS — E55.9 VITAMIN D DEFICIENCY: ICD-10-CM

## 2024-08-22 DIAGNOSIS — I73.00 RAYNAUD'S SYNDROME WITHOUT GANGRENE: ICD-10-CM

## 2024-08-22 DIAGNOSIS — E78.00 HIGH CHOLESTEROL: ICD-10-CM

## 2024-08-22 DIAGNOSIS — E53.8 B12 DEFICIENCY: ICD-10-CM

## 2024-08-22 DIAGNOSIS — I10 ESSENTIAL (PRIMARY) HYPERTENSION: Primary | ICD-10-CM

## 2024-08-22 DIAGNOSIS — M34.9 SYSTEMIC SCLEROSIS, UNSPECIFIED (HCC): ICD-10-CM

## 2024-08-22 DIAGNOSIS — E03.8 HYPOTHYROIDISM DUE TO HASHIMOTO'S THYROIDITIS: ICD-10-CM

## 2024-08-22 DIAGNOSIS — E06.3 HYPOTHYROIDISM DUE TO HASHIMOTO'S THYROIDITIS: ICD-10-CM

## 2024-08-22 PROCEDURE — 99214 OFFICE O/P EST MOD 30 MIN: CPT | Performed by: INTERNAL MEDICINE

## 2024-08-22 PROCEDURE — 3079F DIAST BP 80-89 MM HG: CPT | Performed by: INTERNAL MEDICINE

## 2024-08-22 PROCEDURE — 3077F SYST BP >= 140 MM HG: CPT | Performed by: INTERNAL MEDICINE

## 2024-08-22 RX ORDER — NEBIVOLOL 5 MG/1
5 TABLET ORAL DAILY
Qty: 30 TABLET | Refills: 1 | Status: SHIPPED | OUTPATIENT
Start: 2024-08-22

## 2024-08-22 RX ORDER — OMEPRAZOLE 10 MG/1
10 CAPSULE, DELAYED RELEASE ORAL DAILY
COMMUNITY

## 2024-08-22 ASSESSMENT — PATIENT HEALTH QUESTIONNAIRE - PHQ9
10. IF YOU CHECKED OFF ANY PROBLEMS, HOW DIFFICULT HAVE THESE PROBLEMS MADE IT FOR YOU TO DO YOUR WORK, TAKE CARE OF THINGS AT HOME, OR GET ALONG WITH OTHER PEOPLE: NOT DIFFICULT AT ALL
SUM OF ALL RESPONSES TO PHQ QUESTIONS 1-9: 0
6. FEELING BAD ABOUT YOURSELF - OR THAT YOU ARE A FAILURE OR HAVE LET YOURSELF OR YOUR FAMILY DOWN: NOT AT ALL
SUM OF ALL RESPONSES TO PHQ QUESTIONS 1-9: 0
SUM OF ALL RESPONSES TO PHQ9 QUESTIONS 1 & 2: 0
5. POOR APPETITE OR OVEREATING: NOT AT ALL
2. FEELING DOWN, DEPRESSED OR HOPELESS: NOT AT ALL
9. THOUGHTS THAT YOU WOULD BE BETTER OFF DEAD, OR OF HURTING YOURSELF: NOT AT ALL
SUM OF ALL RESPONSES TO PHQ QUESTIONS 1-9: 0
3. TROUBLE FALLING OR STAYING ASLEEP: NOT AT ALL
SUM OF ALL RESPONSES TO PHQ QUESTIONS 1-9: 0
1. LITTLE INTEREST OR PLEASURE IN DOING THINGS: NOT AT ALL
7. TROUBLE CONCENTRATING ON THINGS, SUCH AS READING THE NEWSPAPER OR WATCHING TELEVISION: NOT AT ALL
4. FEELING TIRED OR HAVING LITTLE ENERGY: NOT AT ALL

## 2024-08-22 NOTE — PROGRESS NOTES
\"Have you been to the ER, urgent care clinic since your last visit?  Hospitalized since your last visit?\"    NO    “Have you seen or consulted any other health care providers outside of [facility] since your last visit?”    NO            Click Here for Release of Records Request  
She used to take vitamin D supplements daily but stopped . She also takes methyl B12 for a B12 deficiency.    Review of Systems     10 systems reviewed and negative other then HPI  Reviewed social history, medical history, family history and allergies no changes     Current Outpatient Medications   Medication Sig Dispense Refill    omeprazole (PRILOSEC) 10 MG delayed release capsule Take 1 capsule by mouth daily      nebivolol (BYSTOLIC) 5 MG tablet Take 1 tablet by mouth daily 30 tablet 1    ARMOUR THYROID 90 MG tablet TAKE 1 TABLET BY MOUTH EVERY DAY 90 tablet 1    pantoprazole (PROTONIX) 40 MG tablet TAKE 1 TABLET BY MOUTH EVERY DAY BEFORE BREAKFAST 90 tablet 1    albuterol sulfate HFA (PROVENTIL;VENTOLIN;PROAIR) 108 (90 Base) MCG/ACT inhaler Inhale 1 puff into the lungs every 6 hours as needed      cetirizine (ZYRTEC) 10 MG tablet Take 1 tablet by mouth nightly      cyanocobalamin 500 MCG tablet Take 1 tablet by mouth daily       No current facility-administered medications for this visit.      Physical exam  General:  Well appearing adult no acute distress  HEENT:   PERRL,normal conjunctiva. External ear and canals normal, TMs normal.  Hearing normal to voice.  Nose without edema or discharge, normal septum.  Lips, teeth, gums normal.  Oropharynx: no erythema, no exudates, no lesions, normal tongue.  Neck:  Supple. Thyroid normal size, nontender, without nodules.  No carotid bruit. No masses or lymphadenopathy  Respiratory: no respiratory distress,  no wheezing, no rhonchi, no rales. No chest wall tenderness.  Cardiovascular:  RRR, normal S1S2, no murmur.    Gastrointestinal: normal bowel sounds, soft, nontender, without masses.  No hepatosplenomegaly.  Extremities +2 pulses, no edema, normal sensation   Musculoskeletal:  Normal gait. Normal digits and nails.  Normal strength and tone, no atrophy, and no abnormal movement.  Skin:Scleroderma changes in her hands B/L   With 2nd digit calcinosis  Neuro:  A and

## 2024-08-23 LAB
25(OH)D3 SERPL-MCNC: 33.7 NG/ML (ref 30–100)
ALBUMIN SERPL-MCNC: 3.7 G/DL (ref 3.5–5)
ALBUMIN/GLOB SERPL: 1.3 (ref 1.1–2.2)
ALP SERPL-CCNC: 72 U/L (ref 45–117)
ALT SERPL-CCNC: 49 U/L (ref 12–78)
ANION GAP SERPL CALC-SCNC: 3 MMOL/L (ref 5–15)
AST SERPL-CCNC: 23 U/L (ref 15–37)
BASOPHILS # BLD: 0.1 K/UL (ref 0–0.1)
BASOPHILS NFR BLD: 1 % (ref 0–1)
BILIRUB SERPL-MCNC: 0.4 MG/DL (ref 0.2–1)
BUN SERPL-MCNC: 15 MG/DL (ref 6–20)
BUN/CREAT SERPL: 22 (ref 12–20)
CALCIUM SERPL-MCNC: 9.1 MG/DL (ref 8.5–10.1)
CHLORIDE SERPL-SCNC: 110 MMOL/L (ref 97–108)
CHOLEST SERPL-MCNC: 210 MG/DL
CO2 SERPL-SCNC: 28 MMOL/L (ref 21–32)
CREAT SERPL-MCNC: 0.68 MG/DL (ref 0.55–1.02)
DIFFERENTIAL METHOD BLD: NORMAL
EOSINOPHIL # BLD: 0.1 K/UL (ref 0–0.4)
EOSINOPHIL NFR BLD: 2 % (ref 0–7)
ERYTHROCYTE [DISTWIDTH] IN BLOOD BY AUTOMATED COUNT: 13.1 % (ref 11.5–14.5)
FOLATE SERPL-MCNC: 8.6 NG/ML (ref 5–21)
GLOBULIN SER CALC-MCNC: 2.8 G/DL (ref 2–4)
GLUCOSE SERPL-MCNC: 96 MG/DL (ref 65–100)
HCT VFR BLD AUTO: 40.4 % (ref 35–47)
HDLC SERPL-MCNC: 68 MG/DL
HDLC SERPL: 3.1 (ref 0–5)
HGB BLD-MCNC: 13.2 G/DL (ref 11.5–16)
IMM GRANULOCYTES # BLD AUTO: 0 K/UL (ref 0–0.04)
IMM GRANULOCYTES NFR BLD AUTO: 0 % (ref 0–0.5)
LDLC SERPL CALC-MCNC: 124.6 MG/DL (ref 0–100)
LYMPHOCYTES # BLD: 1.7 K/UL (ref 0.8–3.5)
LYMPHOCYTES NFR BLD: 27 % (ref 12–49)
MCH RBC QN AUTO: 29 PG (ref 26–34)
MCHC RBC AUTO-ENTMCNC: 32.7 G/DL (ref 30–36.5)
MCV RBC AUTO: 88.8 FL (ref 80–99)
MONOCYTES # BLD: 0.8 K/UL (ref 0–1)
MONOCYTES NFR BLD: 12 % (ref 5–13)
NEUTS SEG # BLD: 3.8 K/UL (ref 1.8–8)
NEUTS SEG NFR BLD: 58 % (ref 32–75)
NRBC # BLD: 0 K/UL (ref 0–0.01)
NRBC BLD-RTO: 0 PER 100 WBC
PLATELET # BLD AUTO: 271 K/UL (ref 150–400)
PMV BLD AUTO: 10.5 FL (ref 8.9–12.9)
POTASSIUM SERPL-SCNC: 4.1 MMOL/L (ref 3.5–5.1)
PROT SERPL-MCNC: 6.5 G/DL (ref 6.4–8.2)
RBC # BLD AUTO: 4.55 M/UL (ref 3.8–5.2)
SODIUM SERPL-SCNC: 141 MMOL/L (ref 136–145)
T4 FREE SERPL-MCNC: 0.8 NG/DL (ref 0.8–1.5)
TRIGL SERPL-MCNC: 87 MG/DL
TSH SERPL DL<=0.05 MIU/L-ACNC: 1.21 UIU/ML (ref 0.36–3.74)
VIT B12 SERPL-MCNC: 1575 PG/ML (ref 193–986)
VLDLC SERPL CALC-MCNC: 17.4 MG/DL
WBC # BLD AUTO: 6.5 K/UL (ref 3.6–11)

## 2024-09-23 ENCOUNTER — PATIENT MESSAGE (OUTPATIENT)
Age: 64
End: 2024-09-23

## 2024-09-24 RX ORDER — NEBIVOLOL 2.5 MG/1
2.5 TABLET ORAL DAILY
Qty: 90 TABLET | Refills: 1 | OUTPATIENT
Start: 2024-09-24

## 2024-09-24 NOTE — TELEPHONE ENCOUNTER
PCP: Alejandra Nagy MD    Last appt:   8/22/2024    Future Appointments   Date Time Provider Department Center   11/22/2024  9:30 AM Alejandra Nagy MD [facility] DEP       Requested Prescriptions     Pending Prescriptions Disp Refills    nebivolol (BYSTOLIC) 2.5 MG tablet 90 tablet 1     Sig: Take 1 tablet by mouth daily

## 2024-09-26 DIAGNOSIS — I10 ESSENTIAL (PRIMARY) HYPERTENSION: ICD-10-CM

## 2024-09-26 RX ORDER — NEBIVOLOL 2.5 MG/1
2.5 TABLET ORAL DAILY
Qty: 90 TABLET | Refills: 1 | Status: SHIPPED | OUTPATIENT
Start: 2024-09-26

## 2024-09-26 RX ORDER — NEBIVOLOL 5 MG/1
5 TABLET ORAL DAILY
Qty: 90 TABLET | Refills: 1 | OUTPATIENT
Start: 2024-09-26

## 2024-10-03 ENCOUNTER — TELEPHONE (OUTPATIENT)
Age: 64
End: 2024-10-03

## 2024-10-03 DIAGNOSIS — M34.9 SYSTEMIC SCLEROSIS, UNSPECIFIED (HCC): Primary | ICD-10-CM

## 2024-10-16 ENCOUNTER — TELEPHONE (OUTPATIENT)
Age: 64
End: 2024-10-16

## 2024-10-16 DIAGNOSIS — M34.9 SYSTEMIC SCLEROSIS, UNSPECIFIED (HCC): Primary | ICD-10-CM

## 2024-10-16 NOTE — TELEPHONE ENCOUNTER
[facility] scheduling states the echo order will  prior to her getting in.      They will need a new order put in the system for her.

## 2024-10-18 ENCOUNTER — TELEPHONE (OUTPATIENT)
Age: 64
End: 2024-10-18

## 2024-10-18 NOTE — TELEPHONE ENCOUNTER
[facility] central scheduling called to get referral for ROLANDA Echo changed to an TTE. BS central scheduling could not schedule an ROLANDA and states it would go through cardio.

## 2024-11-20 ENCOUNTER — OFFICE VISIT (OUTPATIENT)
Age: 64
End: 2024-11-20

## 2024-11-20 VITALS
WEIGHT: 169.6 LBS | BODY MASS INDEX: 30.05 KG/M2 | SYSTOLIC BLOOD PRESSURE: 131 MMHG | TEMPERATURE: 97.4 F | DIASTOLIC BLOOD PRESSURE: 80 MMHG | OXYGEN SATURATION: 95 % | HEIGHT: 63 IN | HEART RATE: 71 BPM | RESPIRATION RATE: 20 BRPM

## 2024-11-20 DIAGNOSIS — K21.9 GASTROESOPHAGEAL REFLUX DISEASE WITHOUT ESOPHAGITIS: ICD-10-CM

## 2024-11-20 DIAGNOSIS — I73.00 RAYNAUD'S SYNDROME WITHOUT GANGRENE: ICD-10-CM

## 2024-11-20 DIAGNOSIS — E06.3 HYPOTHYROIDISM DUE TO HASHIMOTO'S THYROIDITIS: ICD-10-CM

## 2024-11-20 DIAGNOSIS — M54.6 CHRONIC BILATERAL THORACIC BACK PAIN: ICD-10-CM

## 2024-11-20 DIAGNOSIS — M34.9 SYSTEMIC SCLEROSIS, UNSPECIFIED (HCC): ICD-10-CM

## 2024-11-20 DIAGNOSIS — I10 ESSENTIAL (PRIMARY) HYPERTENSION: ICD-10-CM

## 2024-11-20 DIAGNOSIS — G89.29 CHRONIC BILATERAL THORACIC BACK PAIN: ICD-10-CM

## 2024-11-20 DIAGNOSIS — K52.9 CHRONIC DIARRHEA: ICD-10-CM

## 2024-11-20 DIAGNOSIS — Z23 NEEDS FLU SHOT: Primary | ICD-10-CM

## 2024-11-20 RX ORDER — ESTRADIOL/NORETHINDRONE ACETATE TRANSDERMAL SYSTEM .05; .25 MG/D; MG/D
1 PATCH, EXTENDED RELEASE TRANSDERMAL
COMMUNITY
Start: 2024-11-06

## 2024-11-20 NOTE — PROGRESS NOTES
After obtaining consent, and per verbal order from Alejandra Nagy MD, patient received influenza vaccine given in  Left deltoid  Influenza Vaccine 0.5 mL IM now. Patient was observed for 10 minutes post injection. Patient tolerated injection well.\"Have you been to the ER, urgent care clinic since your last visit?  Hospitalized since your last visit?\"    NO    “Have you seen or consulted any other health care providers outside our system since your last visit?”    Yes, GI (Dr. Trivedi) and RA ()           
without masses.  No hepatosplenomegaly.  Extremities +2 pulses, no edema, normal sensation   Musculoskeletal:  Normal gait. Normal digits and nails.  Normal strength and tone, no atrophy, and no abnormal movement.  Skin:  No rash, no lesions, no ulcers.  Skin warm, normal turgor, without induration or nodules.  Neuro:  A and OX4, fluent speech, cranial nerves normal 2-12.    Psych:  Normal affect     Objective   Blood pressure 131/80, pulse 71, temperature 97.4 °F (36.3 °C), resp. rate 20, height 1.6 m (5' 3\"), weight 76.9 kg (169 lb 9.6 oz), SpO2 95%.  Physical Exam             The patient (or guardian, if applicable) and other individuals in attendance with the patient were advised that Artificial Intelligence will be utilized during this visit to record, process the conversation to generate a clinical note and to support improvement of the AI technology. The patient (or guardian, if applicable) and other individuals in attendance at the appointment consented to the use of AI, including the recording.      An electronic signature was used to authenticate this note.    --Alejandra Whitfield MD

## 2024-12-19 ENCOUNTER — HOSPITAL ENCOUNTER (OUTPATIENT)
Facility: HOSPITAL | Age: 64
Discharge: HOME | End: 2024-12-21
Attending: INTERNAL MEDICINE
Payer: COMMERCIAL

## 2024-12-19 ENCOUNTER — HOSPITAL ENCOUNTER (OUTPATIENT)
Facility: HOSPITAL | Age: 64
Discharge: HOME | End: 2024-12-22
Attending: INTERNAL MEDICINE
Payer: COMMERCIAL

## 2024-12-19 DIAGNOSIS — M34.9 SYSTEMIC SCLEROSIS, UNSPECIFIED (HCC): ICD-10-CM

## 2024-12-19 PROCEDURE — 94729 DIFFUSING CAPACITY: CPT

## 2024-12-19 PROCEDURE — 93306 TTE W/DOPPLER COMPLETE: CPT

## 2024-12-19 PROCEDURE — 94060 EVALUATION OF WHEEZING: CPT

## 2024-12-19 PROCEDURE — 6360000002 HC RX W HCPCS: Performed by: INTERNAL MEDICINE

## 2024-12-19 PROCEDURE — 94726 PLETHYSMOGRAPHY LUNG VOLUMES: CPT

## 2024-12-19 PROCEDURE — 94640 AIRWAY INHALATION TREATMENT: CPT

## 2024-12-19 RX ORDER — ALBUTEROL SULFATE 5 MG/ML
2.5 SOLUTION RESPIRATORY (INHALATION)
Status: DISCONTINUED | OUTPATIENT
Start: 2024-12-19 | End: 2024-12-19

## 2024-12-19 RX ORDER — ALBUTEROL SULFATE 0.83 MG/ML
2.5 SOLUTION RESPIRATORY (INHALATION)
Status: COMPLETED | OUTPATIENT
Start: 2024-12-19 | End: 2024-12-19

## 2024-12-19 RX ADMIN — ALBUTEROL SULFATE 2.5 MG: 2.5 SOLUTION RESPIRATORY (INHALATION) at 12:00

## 2024-12-22 LAB
ECHO AO ASC DIAM: 3.7 CM
ECHO AO ROOT DIAM: 2.9 CM
ECHO AV AREA PEAK VELOCITY: 1.5 CM2
ECHO AV AREA VTI: 1.5 CM2
ECHO AV MEAN GRADIENT: 5 MMHG
ECHO AV MEAN VELOCITY: 1.1 M/S
ECHO AV PEAK GRADIENT: 10 MMHG
ECHO AV PEAK VELOCITY: 1.6 M/S
ECHO AV VELOCITY RATIO: 0.75
ECHO AV VTI: 36.1 CM
ECHO EST RA PRESSURE: 8 MMHG
ECHO LA DIAMETER: 4.4 CM
ECHO LA TO AORTIC ROOT RATIO: 1.52
ECHO LA VOL A-L A4C: 53 ML (ref 22–52)
ECHO LA VOL MOD A4C: 49 ML (ref 22–52)
ECHO LV E' LATERAL VELOCITY: 10.65 CM/S
ECHO LV E' SEPTAL VELOCITY: 10.81 CM/S
ECHO LV EDV A4C: 75 ML
ECHO LV EF PHYSICIAN: 60 %
ECHO LV EJECTION FRACTION A4C: 70 %
ECHO LV ESV A4C: 23 ML
ECHO LV FRACTIONAL SHORTENING: 49 % (ref 28–44)
ECHO LV INTERNAL DIMENSION DIASTOLIC: 4.7 CM (ref 3.9–5.3)
ECHO LV INTERNAL DIMENSION SYSTOLIC: 2.4 CM
ECHO LV IVSD: 0.9 CM (ref 0.6–0.9)
ECHO LV MASS 2D: 142.7 G (ref 67–162)
ECHO LV POSTERIOR WALL DIASTOLIC: 0.9 CM (ref 0.6–0.9)
ECHO LV RELATIVE WALL THICKNESS RATIO: 0.38
ECHO LVOT AREA: 2 CM2
ECHO LVOT AV VTI INDEX: 0.8
ECHO LVOT DIAM: 1.6 CM
ECHO LVOT MEAN GRADIENT: 3 MMHG
ECHO LVOT PEAK GRADIENT: 6 MMHG
ECHO LVOT PEAK VELOCITY: 1.2 M/S
ECHO LVOT SV: 57.9 ML
ECHO LVOT VTI: 28.8 CM
ECHO MV A VELOCITY: 0.62 M/S
ECHO MV AREA VTI: 2.1 CM2
ECHO MV E DECELERATION TIME (DT): 249.5 MS
ECHO MV E VELOCITY: 0.8 M/S
ECHO MV E/A RATIO: 1.29
ECHO MV E/E' LATERAL: 7.51
ECHO MV E/E' RATIO (AVERAGED): 7.46
ECHO MV E/E' SEPTAL: 7.4
ECHO MV LVOT VTI INDEX: 0.95
ECHO MV MAX VELOCITY: 1.1 M/S
ECHO MV MEAN GRADIENT: 2 MMHG
ECHO MV MEAN VELOCITY: 0.7 M/S
ECHO MV PEAK GRADIENT: 5 MMHG
ECHO MV REGURGITANT PEAK GRADIENT: 19 MMHG
ECHO MV REGURGITANT PEAK VELOCITY: 2.2 M/S
ECHO MV VTI: 27.3 CM
ECHO PV MAX VELOCITY: 0.9 M/S
ECHO PV MEAN GRADIENT: 2 MMHG
ECHO PV MEAN VELOCITY: 0.7 M/S
ECHO PV PEAK GRADIENT: 4 MMHG
ECHO RIGHT VENTRICULAR SYSTOLIC PRESSURE (RVSP): 26 MMHG
ECHO RV INTERNAL DIMENSION: 2.9 CM
ECHO RV TAPSE: 2.2 CM (ref 1.7–?)
ECHO TV REGURGITANT MAX VELOCITY: 2.14 M/S
ECHO TV REGURGITANT PEAK GRADIENT: 19 MMHG

## 2024-12-24 ENCOUNTER — TELEPHONE (OUTPATIENT)
Age: 64
End: 2024-12-24

## 2024-12-28 LAB
ALBUMIN SERPL-MCNC: 4.2 G/DL (ref 3.9–4.9)
ALP SERPL-CCNC: 72 IU/L (ref 44–121)
ALT SERPL-CCNC: 12 IU/L (ref 0–32)
APPEARANCE UR: CLEAR
AST SERPL-CCNC: 13 IU/L (ref 0–40)
BACTERIA #/AREA URNS HPF: NORMAL /[HPF]
BASOPHILS # BLD AUTO: 0.1 X10E3/UL (ref 0–0.2)
BASOPHILS NFR BLD AUTO: 1 %
BILIRUB SERPL-MCNC: 0.3 MG/DL (ref 0–1.2)
BILIRUB UR QL STRIP: NEGATIVE
BUN SERPL-MCNC: 13 MG/DL (ref 8–27)
BUN/CREAT SERPL: 20 (ref 12–28)
CALCIUM SERPL-MCNC: 9.1 MG/DL (ref 8.7–10.3)
CASTS URNS QL MICRO: NORMAL /LPF
CHLORIDE SERPL-SCNC: 99 MMOL/L (ref 96–106)
CO2 SERPL-SCNC: 20 MMOL/L (ref 20–29)
COLOR UR: YELLOW
CREAT SERPL-MCNC: 0.64 MG/DL (ref 0.57–1)
EGFRCR SERPLBLD CKD-EPI 2021: 99 ML/MIN/1.73
EOSINOPHIL # BLD AUTO: 0.1 X10E3/UL (ref 0–0.4)
EOSINOPHIL NFR BLD AUTO: 1 %
EPI CELLS #/AREA URNS HPF: NORMAL /HPF (ref 0–10)
ERYTHROCYTE [DISTWIDTH] IN BLOOD BY AUTOMATED COUNT: 12.2 % (ref 11.7–15.4)
GLOBULIN SER CALC-MCNC: 2.1 G/DL (ref 1.5–4.5)
GLUCOSE SERPL-MCNC: 82 MG/DL (ref 70–99)
GLUCOSE UR QL STRIP: NEGATIVE
HCT VFR BLD AUTO: 44.2 % (ref 34–46.6)
HGB BLD-MCNC: 14.5 G/DL (ref 11.1–15.9)
HGB UR QL STRIP: NEGATIVE
IMM GRANULOCYTES # BLD AUTO: 0 X10E3/UL (ref 0–0.1)
IMM GRANULOCYTES NFR BLD AUTO: 0 %
KETONES UR QL STRIP: ABNORMAL
LEUKOCYTE ESTERASE UR QL STRIP: NEGATIVE
LYMPHOCYTES # BLD AUTO: 1.7 X10E3/UL (ref 0.7–3.1)
LYMPHOCYTES NFR BLD AUTO: 16 %
MCH RBC QN AUTO: 29.1 PG (ref 26.6–33)
MCHC RBC AUTO-ENTMCNC: 32.8 G/DL (ref 31.5–35.7)
MCV RBC AUTO: 89 FL (ref 79–97)
MICRO URNS: ABNORMAL
MICRO URNS: ABNORMAL
MONOCYTES # BLD AUTO: 1 X10E3/UL (ref 0.1–0.9)
MONOCYTES NFR BLD AUTO: 10 %
NEUTROPHILS # BLD AUTO: 7.5 X10E3/UL (ref 1.4–7)
NEUTROPHILS NFR BLD AUTO: 72 %
NITRITE UR QL STRIP: NEGATIVE
PH UR STRIP: 6 [PH] (ref 5–7.5)
PLATELET # BLD AUTO: 322 X10E3/UL (ref 150–450)
POTASSIUM SERPL-SCNC: 3.7 MMOL/L (ref 3.5–5.2)
PROT SERPL-MCNC: 6.3 G/DL (ref 6–8.5)
PROT UR QL STRIP: NEGATIVE
RBC # BLD AUTO: 4.98 X10E6/UL (ref 3.77–5.28)
RBC #/AREA URNS HPF: NORMAL /HPF (ref 0–2)
SODIUM SERPL-SCNC: 135 MMOL/L (ref 134–144)
SP GR UR STRIP: 1.01 (ref 1–1.03)
TSH SERPL DL<=0.005 MIU/L-ACNC: 2.69 UIU/ML (ref 0.45–4.5)
UROBILINOGEN UR STRIP-MCNC: 0.2 MG/DL (ref 0.2–1)
WBC # BLD AUTO: 10.4 X10E3/UL (ref 3.4–10.8)
WBC #/AREA URNS HPF: NORMAL /HPF (ref 0–5)

## 2024-12-30 ENCOUNTER — OFFICE VISIT (OUTPATIENT)
Age: 64
End: 2024-12-30
Payer: COMMERCIAL

## 2024-12-30 VITALS
TEMPERATURE: 97 F | RESPIRATION RATE: 18 BRPM | HEIGHT: 63 IN | WEIGHT: 165.8 LBS | OXYGEN SATURATION: 98 % | DIASTOLIC BLOOD PRESSURE: 86 MMHG | HEART RATE: 69 BPM | SYSTOLIC BLOOD PRESSURE: 139 MMHG | BODY MASS INDEX: 29.38 KG/M2

## 2024-12-30 DIAGNOSIS — R00.2 PALPITATIONS: Primary | ICD-10-CM

## 2024-12-30 DIAGNOSIS — I51.7 ENLARGED LA (LEFT ATRIUM): ICD-10-CM

## 2024-12-30 DIAGNOSIS — M34.9 SYSTEMIC SCLEROSIS, UNSPECIFIED (HCC): ICD-10-CM

## 2024-12-30 DIAGNOSIS — I10 ESSENTIAL (PRIMARY) HYPERTENSION: ICD-10-CM

## 2024-12-30 PROCEDURE — 99214 OFFICE O/P EST MOD 30 MIN: CPT | Performed by: INTERNAL MEDICINE

## 2024-12-30 PROCEDURE — 93000 ELECTROCARDIOGRAM COMPLETE: CPT | Performed by: INTERNAL MEDICINE

## 2024-12-30 PROCEDURE — 3079F DIAST BP 80-89 MM HG: CPT | Performed by: INTERNAL MEDICINE

## 2024-12-30 PROCEDURE — 3075F SYST BP GE 130 - 139MM HG: CPT | Performed by: INTERNAL MEDICINE

## 2024-12-30 ASSESSMENT — PATIENT HEALTH QUESTIONNAIRE - PHQ9
SUM OF ALL RESPONSES TO PHQ QUESTIONS 1-9: 4
10. IF YOU CHECKED OFF ANY PROBLEMS, HOW DIFFICULT HAVE THESE PROBLEMS MADE IT FOR YOU TO DO YOUR WORK, TAKE CARE OF THINGS AT HOME, OR GET ALONG WITH OTHER PEOPLE: NOT DIFFICULT AT ALL
8. MOVING OR SPEAKING SO SLOWLY THAT OTHER PEOPLE COULD HAVE NOTICED. OR THE OPPOSITE, BEING SO FIGETY OR RESTLESS THAT YOU HAVE BEEN MOVING AROUND A LOT MORE THAN USUAL: NOT AT ALL
6. FEELING BAD ABOUT YOURSELF - OR THAT YOU ARE A FAILURE OR HAVE LET YOURSELF OR YOUR FAMILY DOWN: NOT AT ALL
1. LITTLE INTEREST OR PLEASURE IN DOING THINGS: NOT AT ALL
SUM OF ALL RESPONSES TO PHQ QUESTIONS 1-9: 4
9. THOUGHTS THAT YOU WOULD BE BETTER OFF DEAD, OR OF HURTING YOURSELF: NOT AT ALL
5. POOR APPETITE OR OVEREATING: MORE THAN HALF THE DAYS
2. FEELING DOWN, DEPRESSED OR HOPELESS: NOT AT ALL
SUM OF ALL RESPONSES TO PHQ QUESTIONS 1-9: 4
4. FEELING TIRED OR HAVING LITTLE ENERGY: SEVERAL DAYS
3. TROUBLE FALLING OR STAYING ASLEEP: SEVERAL DAYS
7. TROUBLE CONCENTRATING ON THINGS, SUCH AS READING THE NEWSPAPER OR WATCHING TELEVISION: NOT AT ALL
SUM OF ALL RESPONSES TO PHQ QUESTIONS 1-9: 4
SUM OF ALL RESPONSES TO PHQ9 QUESTIONS 1 & 2: 0

## 2024-12-30 NOTE — PROGRESS NOTES
Kayla Tejeda (:  1960) is a 64 y.o. female, Established patient, here for evaluation of the following chief complaint(s):  Nausea (Nausea, faint feeling, chills, loss of appetite, jittery insides, onset last monday  ) and Discuss Labs (Discuss labs and echo)         Assessment & Plan  1. Nausea.  She reports a significant loss of appetite and nausea over the past week, with difficulty eating and associated chills. She has not experienced any fever, sinus congestion, or urinary symptoms. e. Her lab results indicate elevated neutrophils and white blood cell count, suggesting a recent or ongoing infection. She is advised to monitor her symptoms and maintain hydration. However there is no cough and urine is normal. Appears well in the office today. If symptoms persist, further evaluation may be necessary.    2. Palpitations.  Left atrium enlargement  She reports experiencing palpitations and a sensation similar to an anxiety attack, although she has not had an anxiety attack since discontinuing Xanax 2 years ago. Her echocardiogram reveals left atrial enlargement and mild mitral regurgitation, which could potentially lead to atrial fibrillation. She is currently on Bystolic, a beta blocker, for blood pressure management, which also helps with PVCs. An EKG will be performed today. A referral to Dr. Velazquez, a cardiologist, will be made for further evaluation. She is advised to continue her Bystolic regimen    3. Scleroderma.  Her scleroderma appears stable with no recent flare-ups.   A trial of CellCept was recommended by her rheumatologist, Dr. Moscoso. However, she has not initiated this treatment due to concerns about potential side effects.     4. Hypertension.  Her blood pressure readings have been consistently around 140 mmHg. She is advised to continue her current Bystolic regimen, maintain a healthy diet, avoid salt, and engage in regular exercise. She is also encouraged to keep her scheduled

## 2025-01-02 RX ORDER — THYROID,PORK 90 MG
90 TABLET ORAL DAILY
Qty: 90 TABLET | Refills: 1 | Status: SHIPPED | OUTPATIENT
Start: 2025-01-02

## 2025-03-18 SDOH — ECONOMIC STABILITY: FOOD INSECURITY: WITHIN THE PAST 12 MONTHS, THE FOOD YOU BOUGHT JUST DIDN'T LAST AND YOU DIDN'T HAVE MONEY TO GET MORE.: NEVER TRUE

## 2025-03-18 SDOH — ECONOMIC STABILITY: INCOME INSECURITY: IN THE LAST 12 MONTHS, WAS THERE A TIME WHEN YOU WERE NOT ABLE TO PAY THE MORTGAGE OR RENT ON TIME?: NO

## 2025-03-18 SDOH — ECONOMIC STABILITY: FOOD INSECURITY: WITHIN THE PAST 12 MONTHS, YOU WORRIED THAT YOUR FOOD WOULD RUN OUT BEFORE YOU GOT MONEY TO BUY MORE.: NEVER TRUE

## 2025-03-18 SDOH — ECONOMIC STABILITY: TRANSPORTATION INSECURITY
IN THE PAST 12 MONTHS, HAS THE LACK OF TRANSPORTATION KEPT YOU FROM MEDICAL APPOINTMENTS OR FROM GETTING MEDICATIONS?: NO

## 2025-03-20 ENCOUNTER — OFFICE VISIT (OUTPATIENT)
Age: 65
End: 2025-03-20
Payer: COMMERCIAL

## 2025-03-20 VITALS
HEIGHT: 63 IN | DIASTOLIC BLOOD PRESSURE: 76 MMHG | OXYGEN SATURATION: 100 % | HEART RATE: 75 BPM | BODY MASS INDEX: 29.13 KG/M2 | TEMPERATURE: 97.8 F | SYSTOLIC BLOOD PRESSURE: 132 MMHG | RESPIRATION RATE: 18 BRPM | WEIGHT: 164.4 LBS

## 2025-03-20 DIAGNOSIS — M35.01 SJOGREN'S SYNDROME WITH KERATOCONJUNCTIVITIS SICCA: ICD-10-CM

## 2025-03-20 DIAGNOSIS — I10 ESSENTIAL (PRIMARY) HYPERTENSION: ICD-10-CM

## 2025-03-20 DIAGNOSIS — M34.9 SYSTEMIC SCLEROSIS, UNSPECIFIED: ICD-10-CM

## 2025-03-20 DIAGNOSIS — H00.012 HORDEOLUM EXTERNUM OF RIGHT LOWER EYELID: Primary | ICD-10-CM

## 2025-03-20 PROCEDURE — 3078F DIAST BP <80 MM HG: CPT | Performed by: INTERNAL MEDICINE

## 2025-03-20 PROCEDURE — 3075F SYST BP GE 130 - 139MM HG: CPT | Performed by: INTERNAL MEDICINE

## 2025-03-20 PROCEDURE — 99214 OFFICE O/P EST MOD 30 MIN: CPT | Performed by: INTERNAL MEDICINE

## 2025-03-20 RX ORDER — OMEPRAZOLE 40 MG/1
40 CAPSULE, DELAYED RELEASE ORAL
Qty: 90 CAPSULE | Refills: 1
Start: 2025-03-20

## 2025-03-20 RX ORDER — ERYTHROMYCIN 5 MG/G
OINTMENT OPHTHALMIC
Qty: 1 EACH | Refills: 0 | Status: SHIPPED | OUTPATIENT
Start: 2025-03-20 | End: 2025-03-30

## 2025-03-20 ASSESSMENT — PATIENT HEALTH QUESTIONNAIRE - PHQ9
6. FEELING BAD ABOUT YOURSELF - OR THAT YOU ARE A FAILURE OR HAVE LET YOURSELF OR YOUR FAMILY DOWN: NOT AT ALL
10. IF YOU CHECKED OFF ANY PROBLEMS, HOW DIFFICULT HAVE THESE PROBLEMS MADE IT FOR YOU TO DO YOUR WORK, TAKE CARE OF THINGS AT HOME, OR GET ALONG WITH OTHER PEOPLE: NOT DIFFICULT AT ALL
5. POOR APPETITE OR OVEREATING: NOT AT ALL
3. TROUBLE FALLING OR STAYING ASLEEP: SEVERAL DAYS
8. MOVING OR SPEAKING SO SLOWLY THAT OTHER PEOPLE COULD HAVE NOTICED. OR THE OPPOSITE, BEING SO FIGETY OR RESTLESS THAT YOU HAVE BEEN MOVING AROUND A LOT MORE THAN USUAL: NOT AT ALL
SUM OF ALL RESPONSES TO PHQ QUESTIONS 1-9: 1
9. THOUGHTS THAT YOU WOULD BE BETTER OFF DEAD, OR OF HURTING YOURSELF: NOT AT ALL
1. LITTLE INTEREST OR PLEASURE IN DOING THINGS: NOT AT ALL
SUM OF ALL RESPONSES TO PHQ QUESTIONS 1-9: 1
4. FEELING TIRED OR HAVING LITTLE ENERGY: NOT AT ALL
SUM OF ALL RESPONSES TO PHQ QUESTIONS 1-9: 1
7. TROUBLE CONCENTRATING ON THINGS, SUCH AS READING THE NEWSPAPER OR WATCHING TELEVISION: NOT AT ALL
SUM OF ALL RESPONSES TO PHQ QUESTIONS 1-9: 1
2. FEELING DOWN, DEPRESSED OR HOPELESS: NOT AT ALL

## 2025-03-20 NOTE — PROGRESS NOTES
\"Have you been to the ER, urgent care clinic since your last visit?  Hospitalized since your last visit?\"    NO    “Have you seen or consulted any other health care providers outside our system since your last visit?”    NO            
no respiratory distress,  no wheezing, no rhonchi, no rales. No chest wall tenderness.  Cardiovascular:  RRR, normal S1S2, no murmur.    Extremities +2 pulses, no edema, normal sensation   Musculoskeletal:  Normal gait. Normal digits and nails.  Normal strength and tone, no atrophy, and no abnormal movement.  Skin:  No rash, no lesions, no ulcers.  Skin warm, normal turgor, without induration or nodules.  Neuro:  A and OX4, fluent speech, cranial nerves normal 2-12.    Psych:  Normal affect     Objective   Blood pressure 132/76, pulse 75, temperature 97.8 °F (36.6 °C), resp. rate 18, height 1.6 m (5' 3\"), weight 74.6 kg (164 lb 6.4 oz), SpO2 100%.  Physical Exam  Lungs were auscultated.    Vital Signs  Blood pressure is normal.           The patient (or guardian, if applicable) and other individuals in attendance with the patient were advised that Artificial Intelligence will be utilized during this visit to record, process the conversation to generate a clinical note and to support improvement of the AI technology. The patient (or guardian, if applicable) and other individuals in attendance at the appointment consented to the use of AI, including the recording.      An electronic signature was used to authenticate this note.    --Alejandra Whitfield MD

## 2025-03-26 RX ORDER — NEBIVOLOL 2.5 MG/1
2.5 TABLET ORAL DAILY
Qty: 90 TABLET | Refills: 1 | Status: SHIPPED | OUTPATIENT
Start: 2025-03-26

## 2025-03-26 RX ORDER — ALBUTEROL SULFATE 90 UG/1
1 INHALANT RESPIRATORY (INHALATION) EVERY 6 HOURS PRN
Qty: 18 G | Refills: 1 | Status: SHIPPED | OUTPATIENT
Start: 2025-03-26

## 2025-03-26 NOTE — TELEPHONE ENCOUNTER
PCP: Alejandra Nagy MD    Last appt:   3/20/2025    Future Appointments   Date Time Provider Department Center   9/23/2025  8:30 AM Alejandra Nagy MD [facility] DEP       Requested Prescriptions     Pending Prescriptions Disp Refills    albuterol sulfate HFA (PROVENTIL;VENTOLIN;PROAIR) 108 (90 Base) MCG/ACT inhaler 18 g 1     Sig: Inhale 1 puff into the lungs every 6 hours as needed for Shortness of Breath

## 2025-05-30 RX ORDER — FLUCONAZOLE 150 MG/1
150 TABLET ORAL ONCE
Qty: 2 TABLET | Refills: 0 | Status: SHIPPED | OUTPATIENT
Start: 2025-05-30 | End: 2025-05-30

## 2025-05-30 NOTE — TELEPHONE ENCOUNTER
Because I'm on the antibiotic for my digit ulcer I now have a horrible yeast infection. Can you write me a prescription for the one pill that gets rid of it but write it for two pills. One never works always end up needing a second. I've been eating tons of greek yogurt in hopes to keep the yeast away but it hasn't worked.

## 2025-06-24 ENCOUNTER — TELEPHONE (OUTPATIENT)
Age: 65
End: 2025-06-24

## 2025-06-24 RX ORDER — SILDENAFIL 50 MG/1
50 TABLET, FILM COATED ORAL DAILY
COMMUNITY
Start: 2025-06-16

## 2025-06-24 RX ORDER — NITROGLYCERIN 20 MG/G
OINTMENT TOPICAL
COMMUNITY
Start: 2025-06-19

## 2025-06-24 NOTE — TELEPHONE ENCOUNTER
Pt states would like a call from clinical staff in regards to medication being changed by rheumatologist.      States bp is elevated and not sure how to proceed with medication.       Please call to discuss.

## 2025-06-24 NOTE — TELEPHONE ENCOUNTER
Patient states that she rheumatologist. Put her on some new medication.   Which was Sildenafil 50 mg and Nirto-bid oin 2%  She states since she has been taking it she does not feel well. She states that her bp has been running high. 169/81 and 150/81.   Patient wants to know should she go back on the bystolic.     Spoke to provider she wants patient to keep taking what the rheumatologist gave add the bystolic back in and take 1/2 of the 2.5 .   See how that works and then let us know.       Patient states she is only take the bystolic 2.5 and the Nirto-bid oin 2% and stop the Sildenafil. She is going to call the rheumatologist and let them know what she is going to do.

## 2025-07-14 RX ORDER — THYROID,PORK 90 MG
90 TABLET ORAL DAILY
Qty: 90 TABLET | Refills: 1 | Status: SHIPPED | OUTPATIENT
Start: 2025-07-14

## (undated) DEVICE — LAMINECTOMY-MRMC: Brand: MEDLINE INDUSTRIES, INC.

## (undated) DEVICE — SOLUTION IRRIG 1000ML STRL H2O USP PLAS POUR BTL

## (undated) DEVICE — SPONGE: SPECIALTY PEANUT XR 100/CS: Brand: MEDICAL ACTION INDUSTRIES

## (undated) DEVICE — SNAP KOVER: Brand: UNBRANDED

## (undated) DEVICE — CASPAR DISTR PIN12MMSTER: Brand: AESCULAP

## (undated) DEVICE — SUTURE MCRYL SZ 4-0 L27IN ABSRB UD L19MM PS-2 1/2 CIR PRIM Y426H

## (undated) DEVICE — FLOSEAL MATRIX IS INDICATED IN SURGICAL PROCEDURES (OTHER THAN IN OPHTHALMIC) AS AN ADJUNCT TO HEMOSTASIS WHEN CONTROL OF BLEEDING BY LIGATURE OR CONVENTIONALPROCEDURES IS INEFFECTIVE OR IMPRACTICAL.: Brand: FLOSEAL HEMOSTATIC MATRIX

## (undated) DEVICE — SOLUTION SURG PREP 26 CC PURPREP

## (undated) DEVICE — SUTURE VCRL SZ 3-0 L18IN ABSRB UD CP-2 L26MM 1/2 CIR REV J761D

## (undated) DEVICE — MARKER,SKIN,WI/RULER AND LABELS: Brand: MEDLINE

## (undated) DEVICE — MAGNETIC INSTR DRAPE 20X16: Brand: MEDLINE INDUSTRIES, INC.

## (undated) DEVICE — DERMABOND SKIN ADH 0.7ML -- DERMABOND ADVANCED 12/BX

## (undated) DEVICE — GLOVE ORANGE PI 7 1/2   MSG9075

## (undated) DEVICE — SHEET,T,THYROID,STERILE: Brand: MEDLINE

## (undated) DEVICE — SOLUTION IRRIG 1000ML 0.9% SOD CHL USP POUR PLAS BTL